# Patient Record
Sex: MALE | Race: WHITE | NOT HISPANIC OR LATINO | Employment: OTHER | URBAN - NONMETROPOLITAN AREA
[De-identification: names, ages, dates, MRNs, and addresses within clinical notes are randomized per-mention and may not be internally consistent; named-entity substitution may affect disease eponyms.]

---

## 2017-02-08 ENCOUNTER — OFFICE VISIT (OUTPATIENT)
Dept: FAMILY MEDICINE CLINIC | Facility: CLINIC | Age: 37
End: 2017-02-08

## 2017-02-08 VITALS
WEIGHT: 155.8 LBS | BODY MASS INDEX: 24.45 KG/M2 | HEIGHT: 67 IN | SYSTOLIC BLOOD PRESSURE: 102 MMHG | DIASTOLIC BLOOD PRESSURE: 72 MMHG

## 2017-02-08 DIAGNOSIS — M54.2 CERVICAL PAIN (NECK): ICD-10-CM

## 2017-02-08 DIAGNOSIS — G83.81 HEMIPARAPLEGIC SYNDROME (HCC): ICD-10-CM

## 2017-02-08 DIAGNOSIS — F41.9 ANXIETY: ICD-10-CM

## 2017-02-08 DIAGNOSIS — G83.81 BROWN-SEQUARD SYNDROME (HCC): ICD-10-CM

## 2017-02-08 DIAGNOSIS — Z99.3 DEPENDENCE ON WHEELCHAIR: Primary | ICD-10-CM

## 2017-02-08 DIAGNOSIS — M62.838 MUSCLE SPASM: ICD-10-CM

## 2017-02-08 DIAGNOSIS — F32.89 OTHER DEPRESSION: ICD-10-CM

## 2017-02-08 PROCEDURE — 99213 OFFICE O/P EST LOW 20 MIN: CPT | Performed by: NURSE PRACTITIONER

## 2017-02-08 RX ORDER — VENLAFAXINE HYDROCHLORIDE 75 MG/1
75 CAPSULE, EXTENDED RELEASE ORAL DAILY
COMMUNITY
End: 2017-02-08 | Stop reason: SDUPTHER

## 2017-02-08 RX ORDER — TRAZODONE HYDROCHLORIDE 50 MG/1
50 TABLET ORAL NIGHTLY
Qty: 30 TABLET | Refills: 5 | Status: SHIPPED | OUTPATIENT
Start: 2017-02-08 | End: 2018-04-17 | Stop reason: HOSPADM

## 2017-02-08 RX ORDER — CLONAZEPAM 1 MG/1
1 TABLET ORAL 4 TIMES DAILY PRN
Qty: 120 TABLET | Refills: 0 | Status: SHIPPED | OUTPATIENT
Start: 2017-02-08 | End: 2017-03-08 | Stop reason: SDUPTHER

## 2017-02-08 RX ORDER — TROLAMINE SALICYLATE 10 G/100G
CREAM TOPICAL AS NEEDED
COMMUNITY
End: 2018-02-02

## 2017-02-08 RX ORDER — CLONAZEPAM 0.25 MG/1
0.25 TABLET, ORALLY DISINTEGRATING ORAL 2 TIMES DAILY PRN
COMMUNITY
End: 2017-03-08 | Stop reason: DRUGHIGH

## 2017-02-08 RX ORDER — OXYCODONE HYDROCHLORIDE AND ACETAMINOPHEN 5; 325 MG/1; MG/1
2 TABLET ORAL EVERY 8 HOURS PRN
COMMUNITY
End: 2017-03-10 | Stop reason: ALTCHOICE

## 2017-02-08 RX ORDER — BACLOFEN 10 MG/1
5 TABLET ORAL 3 TIMES DAILY
COMMUNITY
End: 2017-02-08 | Stop reason: SDUPTHER

## 2017-02-08 RX ORDER — DOCUSATE SODIUM 100 MG/1
100 CAPSULE, LIQUID FILLED ORAL 2 TIMES DAILY
COMMUNITY
End: 2017-07-20 | Stop reason: SDUPTHER

## 2017-02-08 RX ORDER — GABAPENTIN 400 MG/1
400 CAPSULE ORAL 3 TIMES DAILY
Qty: 90 CAPSULE | Refills: 5 | Status: SHIPPED | OUTPATIENT
Start: 2017-02-08 | End: 2017-07-20 | Stop reason: SDUPTHER

## 2017-02-08 RX ORDER — MELATONIN
1000 DAILY
COMMUNITY
End: 2017-03-10 | Stop reason: ALTCHOICE

## 2017-02-08 RX ORDER — BACLOFEN 10 MG/1
5 TABLET ORAL 3 TIMES DAILY
Qty: 90 TABLET | Refills: 5 | Status: SHIPPED | OUTPATIENT
Start: 2017-02-08 | End: 2017-07-20 | Stop reason: SDUPTHER

## 2017-02-08 RX ORDER — GABAPENTIN 600 MG/1
600 TABLET ORAL 3 TIMES DAILY
COMMUNITY
End: 2017-02-08 | Stop reason: SDUPTHER

## 2017-02-08 RX ORDER — VENLAFAXINE HYDROCHLORIDE 75 MG/1
75 CAPSULE, EXTENDED RELEASE ORAL DAILY
Qty: 30 CAPSULE | Refills: 5 | Status: SHIPPED | OUTPATIENT
Start: 2017-02-08 | End: 2017-03-08 | Stop reason: DRUGHIGH

## 2017-02-08 RX ORDER — TRAZODONE HYDROCHLORIDE 50 MG/1
50 TABLET ORAL NIGHTLY
COMMUNITY
End: 2017-02-08 | Stop reason: SDUPTHER

## 2017-02-08 RX ORDER — GABAPENTIN 600 MG/1
600 TABLET ORAL 3 TIMES DAILY
Qty: 90 TABLET | Refills: 5 | Status: SHIPPED | OUTPATIENT
Start: 2017-02-08 | End: 2017-07-20 | Stop reason: SDUPTHER

## 2017-02-08 RX ORDER — UREA 10 %
1 LOTION (ML) TOPICAL NIGHTLY
COMMUNITY
End: 2018-02-02

## 2017-02-08 RX ORDER — POLYETHYLENE GLYCOL 3350 17 G/17G
17 POWDER, FOR SOLUTION ORAL DAILY
COMMUNITY
End: 2018-02-02

## 2017-02-08 NOTE — PROGRESS NOTES
Chief Complaint   Patient presents with   • Follow-up     jared on meds     Subjective   Jean Peñaloza is a 36 y.o. male.     Neck Pain    This is a new (Dec 17th 2016) problem. The current episode started more than 1 month ago. The problem occurs constantly. The problem has been gradually worsening. The pain is associated with a fall. The pain is present in the anterior neck. The quality of the pain is described as aching. The pain is at a severity of 10/10. The pain is severe. The pain is same all the time. Stiffness is present all day. Associated symptoms include headaches, numbness and weakness. Pertinent negatives include no chest pain, fever, leg pain, pain with swallowing, paresis, photophobia, syncope, tingling, trouble swallowing, visual change or weight loss. Associated symptoms comments: See hospital report and rehab report for further information-[facility] . He has tried acetaminophen for the symptoms. The treatment provided mild relief.   Anxiety   Symptoms include decreased concentration and nervous/anxious behavior. Patient reports no chest pain, confusion, dizziness, shortness of breath or suicidal ideas.            The following portions of the patient's history were reviewed and updated as appropriate: allergies, current medications, past social history and problem list.    Review of Systems   Constitutional: Positive for activity change, appetite change and unexpected weight change. Negative for chills, diaphoresis, fatigue, fever and weight loss.   HENT: Negative for trouble swallowing.    Eyes: Negative.  Negative for photophobia, pain, discharge and itching.   Respiratory: Negative.  Negative for apnea, cough, choking, chest tightness, shortness of breath and stridor.    Cardiovascular: Negative.  Negative for chest pain, leg swelling and syncope.   Gastrointestinal: Negative.  Negative for abdominal distention and abdominal pain.   Endocrine:  "Negative.  Negative for cold intolerance and heat intolerance.   Genitourinary: Positive for difficulty urinating. Negative for dysuria.   Musculoskeletal: Positive for arthralgias, back pain, gait problem, joint swelling, myalgias, neck pain and neck stiffness.        Wheelchair for transfer    Skin: Negative.    Allergic/Immunologic: Negative.    Neurological: Positive for speech difficulty, weakness, numbness and headaches. Negative for dizziness, tingling, tremors, seizures, syncope, facial asymmetry and light-headedness.        Left sided weakness from c spine injury    Hematological: Negative.    Psychiatric/Behavioral: Positive for decreased concentration and sleep disturbance. Negative for agitation, behavioral problems, confusion, dysphoric mood, hallucinations, self-injury and suicidal ideas. The patient is nervous/anxious. The patient is not hyperactive.        Objective   Visit Vitals   • /72 (BP Location: Right arm, Patient Position: Sitting, Cuff Size: Adult)   • Ht 67\" (170.2 cm)   • Wt 155 lb 12.8 oz (70.7 kg)   • BMI 24.4 kg/m2     Physical Exam   Constitutional: He is oriented to person, place, and time. He appears well-developed and well-nourished.   HENT:   Head: Normocephalic and atraumatic.   Eyes: EOM are normal. Pupils are equal, round, and reactive to light.   Neck: Normal range of motion. Neck supple.   Cardiovascular: Normal rate.    Pulmonary/Chest: Effort normal.   Abdominal: Soft. Bowel sounds are normal.   Musculoskeletal: He exhibits tenderness.        Right shoulder: He exhibits decreased range of motion.        Right elbow: He exhibits decreased range of motion.        Right wrist: He exhibits decreased range of motion.        Right hip: He exhibits decreased range of motion, decreased strength and tenderness. He exhibits no bony tenderness, no swelling, no crepitus, no deformity and no laceration.        Right knee: He exhibits decreased range of motion.        Right ankle: " He exhibits decreased range of motion.   Wheelchair bound-right sided weakness, decreased rom on right side, left sided numbness    Neurological: He is alert and oriented to person, place, and time.   Nursing note and vitals reviewed.      Assessment/Plan   Problem List Items Addressed This Visit        Nervous and Auditory    Brown-Sequard syndrome    Cervical pain (neck)    Relevant Orders    Ambulatory Referral to Pain Management    Hemiparaplegic syndrome       Musculoskeletal and Integument    Muscle spasm       Other    Anxiety    Depression    Relevant Medications    venlafaxine XR (EFFEXOR-XR) 75 MG 24 hr capsule    traZODone (DESYREL) 50 MG tablet    Dependence on wheelchair - Primary    Relevant Orders    Ambulatory Referral to Pain Management           New Medications Ordered This Visit   Medications   • clonazePAM (KlonoPIN) 0.25 MG disintegrating tablet     Sig: Take 0.25 mg by mouth 2 (Two) Times a Day As Needed for seizures.   • cholecalciferol (VITAMIN D3) 1000 UNITS tablet     Sig: Take 1,000 Units by mouth Daily.   • docusate sodium (COLACE) 100 MG capsule     Sig: Take 100 mg by mouth 2 (Two) Times a Day.   • melatonin 1 MG tablet     Sig: Take 1 mg by mouth Every Night.   • oxyCODONE-acetaminophen (PERCOCET) 5-325 MG per tablet     Sig: Take 2 tablets by mouth Every 8 (Eight) Hours As Needed for other.   • polyethylene glycol (MIRALAX) packet     Sig: Take 17 g by mouth Daily.   • trolamine salicylate (ASPERCREME) 10 % cream     Sig: Apply  topically As Needed for muscle/joint pain.   • venlafaxine XR (EFFEXOR-XR) 75 MG 24 hr capsule     Sig: Take 1 capsule by mouth Daily.     Dispense:  30 capsule     Refill:  5   • traZODone (DESYREL) 50 MG tablet     Sig: Take 1 tablet by mouth Every Night.     Dispense:  30 tablet     Refill:  5   • gabapentin (NEURONTIN) 600 MG tablet     Sig: Take 1 tablet by mouth 3 (Three) Times a Day.     Dispense:  90 tablet     Refill:  5   • baclofen (LIORESAL) 10 MG  tablet     Sig: Take 0.5 tablets by mouth 3 (Three) Times a Day.     Dispense:  90 tablet     Refill:  5   • clonazePAM (KLONOPIN) 1 MG tablet     Sig: Take 1 tablet by mouth 4 (Four) Times a Day As Needed for seizures.     Dispense:  120 tablet     Refill:  0   • gabapentin (NEURONTIN) 400 MG capsule     Sig: Take 1 capsule by mouth 3 (Three) Times a Day.     Dispense:  90 capsule     Refill:  5

## 2017-03-08 ENCOUNTER — OFFICE VISIT (OUTPATIENT)
Dept: FAMILY MEDICINE CLINIC | Facility: CLINIC | Age: 37
End: 2017-03-08

## 2017-03-08 VITALS
BODY MASS INDEX: 24.33 KG/M2 | SYSTOLIC BLOOD PRESSURE: 100 MMHG | WEIGHT: 155 LBS | DIASTOLIC BLOOD PRESSURE: 62 MMHG | HEIGHT: 67 IN

## 2017-03-08 DIAGNOSIS — M62.838 MUSCLE SPASM: ICD-10-CM

## 2017-03-08 DIAGNOSIS — G83.81 HEMIPARAPLEGIC SYNDROME (HCC): ICD-10-CM

## 2017-03-08 DIAGNOSIS — Z99.3 DEPENDENCE ON WHEELCHAIR: Primary | ICD-10-CM

## 2017-03-08 DIAGNOSIS — F41.9 ANXIETY: ICD-10-CM

## 2017-03-08 PROCEDURE — 99213 OFFICE O/P EST LOW 20 MIN: CPT | Performed by: NURSE PRACTITIONER

## 2017-03-08 RX ORDER — VENLAFAXINE HYDROCHLORIDE 150 MG/1
150 CAPSULE, EXTENDED RELEASE ORAL DAILY
COMMUNITY
End: 2017-11-16 | Stop reason: SDUPTHER

## 2017-03-08 RX ORDER — CLONAZEPAM 1 MG/1
1 TABLET ORAL 4 TIMES DAILY PRN
Qty: 120 TABLET | Refills: 0 | Status: SHIPPED | OUTPATIENT
Start: 2017-03-08 | End: 2017-04-14 | Stop reason: SDUPTHER

## 2017-03-08 NOTE — PROGRESS NOTES
Chief Complaint   Patient presents with   • Follow-up     1 month jared   • Med Refill     [facility]   Jean Peñaloza is a 36 y.o. male.     Neck Pain    This is a new (Dec 17th 2016) problem. The current episode started more than 1 month ago. The problem occurs constantly. The problem has been gradually worsening. The pain is associated with a fall. The pain is present in the anterior neck. The quality of the pain is described as aching. The pain is at a severity of 10/10. The pain is severe. The pain is same all the time. Stiffness is present all day. Associated symptoms include headaches, numbness and weakness. Pertinent negatives include no chest pain, fever, leg pain, pain with swallowing, paresis, photophobia, syncope, tingling, trouble swallowing, visual change or weight loss. Associated symptoms comments: See hospital report and rehab report for further information-[facility] . He has tried acetaminophen for the symptoms. The treatment provided mild relief.   Anxiety   Presents for follow-up visit. Symptoms include decreased concentration, excessive worry, irritability, nervous/anxious behavior, obsessions, palpitations and panic. Patient reports no chest pain, compulsions, confusion, depressed mood, dizziness, dry mouth, feeling of choking, hyperventilation, impotence, insomnia, malaise, muscle tension, nausea, restlessness, shortness of breath or suicidal ideas. Symptoms occur constantly. The severity of symptoms is moderate. The quality of sleep is good. Nighttime awakenings: none.     Compliance with medications is %.        The following portions of the patient's history were reviewed and updated as appropriate: allergies, current medications, past social history and problem list.    Review of Systems   Constitutional: Positive for activity change, appetite change, irritability and unexpected weight change. Negative for chills, diaphoresis,  "fatigue, fever and weight loss.   HENT: Negative for congestion, dental problem, drooling and trouble swallowing.    Eyes: Negative.  Negative for photophobia, pain, discharge and itching.   Respiratory: Negative.  Negative for apnea, cough, choking, chest tightness, shortness of breath and stridor.    Cardiovascular: Positive for palpitations. Negative for chest pain, leg swelling and syncope.   Gastrointestinal: Negative.  Negative for abdominal distention, abdominal pain and nausea.   Endocrine: Negative.  Negative for cold intolerance and heat intolerance.   Genitourinary: Negative.  Negative for dysuria and impotence.   Musculoskeletal: Positive for arthralgias, back pain, gait problem, joint swelling, myalgias, neck pain and neck stiffness.        Wheelchair for transfer    Skin: Negative.    Allergic/Immunologic: Negative.    Neurological: Positive for speech difficulty, weakness, numbness and headaches. Negative for dizziness, tingling, tremors, seizures, syncope, facial asymmetry and light-headedness.        Left sided weakness from c spine injury    Hematological: Negative.    Psychiatric/Behavioral: Positive for decreased concentration and sleep disturbance. Negative for agitation, behavioral problems, confusion, dysphoric mood, hallucinations, self-injury and suicidal ideas. The patient is nervous/anxious. The patient does not have insomnia and is not hyperactive.        Objective   Visit Vitals   • /62 (BP Location: Left arm, Patient Position: Sitting, Cuff Size: Adult)   • Ht 67\" (170.2 cm)   • Wt 155 lb (70.3 kg)   • BMI 24.28 kg/m2     Physical Exam   Constitutional: He is oriented to person, place, and time. He appears well-developed and well-nourished.   HENT:   Head: Normocephalic and atraumatic.   Eyes: EOM are normal. Pupils are equal, round, and reactive to light.   Neck: Normal range of motion. Neck supple.   Cardiovascular: Normal rate.    Pulmonary/Chest: Effort normal and breath " sounds normal.   Abdominal: Soft. Bowel sounds are normal.   Musculoskeletal: He exhibits tenderness.        Right shoulder: He exhibits decreased range of motion.        Right elbow: He exhibits decreased range of motion.        Right wrist: He exhibits decreased range of motion.        Right hip: He exhibits decreased range of motion, decreased strength and tenderness. He exhibits no bony tenderness, no swelling, no crepitus, no deformity and no laceration.        Right knee: He exhibits decreased range of motion.        Right ankle: He exhibits decreased range of motion.   Wheelchair bound-right sided weakness, decreased rom on right side, left sided numbness    Neurological: He is alert and oriented to person, place, and time.   Skin: Skin is warm and dry.   Nursing note and vitals reviewed.      Assessment/Plan   Problem List Items Addressed This Visit        Nervous and Auditory    Hemiparaplegic syndrome       Musculoskeletal and Integument    Muscle spasm       Other    Anxiety    Dependence on wheelchair - Primary           New Medications Ordered This Visit   Medications   • venlafaxine XR (EFFEXOR-XR) 150 MG 24 hr capsule     Sig: Take 150 mg by mouth Daily.   • clonazePAM (KLONOPIN) 1 MG tablet     Sig: Take 1 tablet by mouth 4 (Four) Times a Day As Needed for seizures.     Dispense:  120 tablet     Refill:  0        Patient understands the risks associated with this controlled medication, including tolerance and addiction.  he also agrees to only obtain this medication from me, and not from a another provider, unless that provider is covering for me in my absence.  he also agrees to be compliant in dosing, and not self adjust the dose of medication.  A signed controlled substance agreement is on file, and he has received a controlled substance education sheet at this a previous visit.  he has also signed a consent for treatment with a controlled substance as per [facility] policy. MAN was  obtained.

## 2017-03-10 ENCOUNTER — OFFICE VISIT (OUTPATIENT)
Dept: PAIN MEDICINE | Facility: CLINIC | Age: 37
End: 2017-03-10

## 2017-03-10 VITALS
DIASTOLIC BLOOD PRESSURE: 64 MMHG | HEIGHT: 67 IN | BODY MASS INDEX: 24.64 KG/M2 | SYSTOLIC BLOOD PRESSURE: 100 MMHG | WEIGHT: 157 LBS

## 2017-03-10 DIAGNOSIS — M25.50 ARTHRALGIA, UNSPECIFIED JOINT: ICD-10-CM

## 2017-03-10 DIAGNOSIS — G83.81 HEMIPARAPLEGIC SYNDROME (HCC): Primary | ICD-10-CM

## 2017-03-10 DIAGNOSIS — Z99.3 DEPENDENCE ON WHEELCHAIR: ICD-10-CM

## 2017-03-10 DIAGNOSIS — M54.2 CERVICAL PAIN (NECK): ICD-10-CM

## 2017-03-10 DIAGNOSIS — R07.89 STERNUM PAIN: ICD-10-CM

## 2017-03-10 PROCEDURE — 99213 OFFICE O/P EST LOW 20 MIN: CPT | Performed by: NURSE PRACTITIONER

## 2017-03-10 RX ORDER — OXYCODONE AND ACETAMINOPHEN 10; 325 MG/1; MG/1
TABLET ORAL
Refills: 0 | COMMUNITY
Start: 2017-02-09 | End: 2017-05-19

## 2017-03-10 RX ORDER — MAGNESIUM HYDROXIDE 1200 MG/15ML
SUSPENSION ORAL
COMMUNITY
Start: 2017-01-30 | End: 2017-03-10 | Stop reason: ALTCHOICE

## 2017-03-10 NOTE — PROGRESS NOTES
"Jean Peñaloza is a 36 y.o. male.   1980    HPI:   Location: neck and middle back  Quality: stabbing  Severity: 7/10  Timing: constant  Alleviating: nothing  Aggravating: positional      Pt referred to Pain management via [facility] related to Chronic Neck and middle back pain. Pt states \"I was in a [place]  12/17/16.. Landed on my head\". Fractured C5-C6 Crushed (spinal incomplete tear)-- Dr. Campos fused C5-6-7. Pt was completed paralyzed neck down at first, but has gained some mobility with arms and legs. Pt was taken to [facility] ICU, [facility], and continues PT 4 days a week as well. Pt complains of pain in sternum (fracture), neck, and mid back. Pt has used opiate medications via [place] with effectiveness, and continues to do [facility]. Pt has used heat for therapy.       The following portions of the patient's history were reviewed by me and updated as appropriate: allergies, current medications, past family history, past medical history, past social history, past surgical history and problem list.    Past Medical History   Diagnosis Date   • Acute bronchitis    • Anxiety    • Cluster headache 12/02/2015   • Conjunctivitis 08/26/2014   • Depression    • Dorsalgia    • Malaise    • Panic disorder    • Shoulder pain      severe on right          Social History     Social History   • Marital status:      Spouse name: N/A   • Number of children: N/A   • Years of education: N/A     Occupational History   • Not on file.     Social History Main Topics   • Smoking status: Current Every Day Smoker     Types: Cigarettes     Last attempt to quit: 8/14/2014   • Smokeless tobacco: Not on file      Comment: Smoking cessation information given   • Alcohol use No   • Drug use: No   • Sexual activity: Defer     Other Topics Concern   • Not on file     Social History Narrative       Family History   Problem Relation Age of Onset   • Cancer Maternal Grandfather    • " Diabetes Maternal Grandfather          Current Outpatient Prescriptions:   •  baclofen (LIORESAL) 10 MG tablet, Take 0.5 tablets by mouth 3 (Three) Times a Day., Disp: 90 tablet, Rfl: 5  •  clonazePAM (KLONOPIN) 1 MG tablet, Take 1 tablet by mouth 4 (Four) Times a Day As Needed for seizures., Disp: 120 tablet, Rfl: 0  •  docusate sodium (COLACE) 100 MG capsule, Take 100 mg by mouth 2 (Two) Times a Day., Disp: , Rfl:   •  gabapentin (NEURONTIN) 400 MG capsule, Take 1 capsule by mouth 3 (Three) Times a Day., Disp: 90 capsule, Rfl: 5  •  gabapentin (NEURONTIN) 600 MG tablet, Take 1 tablet by mouth 3 (Three) Times a Day., Disp: 90 tablet, Rfl: 5  •  melatonin 1 MG tablet, Take 1 mg by mouth Every Night., Disp: , Rfl:   •  oxyCODONE-acetaminophen (PERCOCET)  MG per tablet, TK 1 T PO Q 6 H PRN FOR PAIN, Disp: , Rfl: 0  •  polyethylene glycol (MIRALAX) packet, Take 17 g by mouth Daily., Disp: , Rfl:   •  traZODone (DESYREL) 50 MG tablet, Take 1 tablet by mouth Every Night., Disp: 30 tablet, Rfl: 5  •  trolamine salicylate (ASPERCREME) 10 % cream, Apply  topically As Needed for muscle/joint pain., Disp: , Rfl:   •  venlafaxine XR (EFFEXOR-XR) 150 MG 24 hr capsule, Take 150 mg by mouth Daily., Disp: , Rfl:     No Known Allergies      He has already failed the following measures, including:   Conservative measures: heat     Review of Systems   Gastrointestinal: Blood in stool: middle.   Musculoskeletal: Positive for back pain and neck pain.     10 system review of systems was reviewed and negative except for above.    Physical Exam   Constitutional: He is oriented to person, place, and time.   Generalized muscle atrophy   HENT:   Head: Normocephalic and atraumatic.   Eyes: Pupils are equal, round, and reactive to light.   Neck: Spinous process tenderness and muscular tenderness present. Decreased range of motion present.   Cardiovascular: Normal rate.    Pulmonary/Chest: Effort normal and breath sounds normal. He  exhibits tenderness ( sternum).   Abdominal: Soft. Bowel sounds are normal. There is no tenderness.   Musculoskeletal:        Cervical back: He exhibits decreased range of motion ( decrease limited AROM) and tenderness.        Thoracic back: He exhibits tenderness.   Sternum pain    Decreased range of motion noted to neck, right upper arm joints greater then left upper arm joints, and left lower extremity joints greater than right lower extremity joints.   Neurological: He is alert and oriented to person, place, and time. He displays atrophy. He displays no tremor. A sensory deficit ( left sided torso and left leg numbness ) is present. He exhibits abnormal muscle tone (diffuse muscle atrophy  ). He displays no seizure activity. Coordination and gait ( WC and CANE ) abnormal.   Reflex Scores:       Tricep reflexes are 0 on the right side and 1+ on the left side.       Bicep reflexes are 0 on the right side and 1+ on the left side.       Brachioradialis reflexes are 0 on the right side and 1+ on the left side.       Patellar reflexes are 2+ on the right side and 0 on the left side.       Achilles reflexes are 1+ on the right side and 0 on the left side.  Right upper arm strength 3/5  Left quad and lower leg 3/5   Skin: Skin is warm and dry. He is not diaphoretic.   Psychiatric: He has a normal mood and affect. His behavior is normal. Judgment normal. He expresses no homicidal and no suicidal ideation. He expresses no suicidal plans and no homicidal plans.   Nursing note and vitals reviewed.      Jean was seen today for back pain and neck pain.    Diagnoses and all orders for this visit:    Hemiparaplegic syndrome    Dependence on wheelchair    Cervical pain (neck)    Sternum pain    Arthralgia, unspecified joint        Medication: Patient reports no negative side effects, Patient reports appropriate usage and storage habits, Patient's opioid provides enough reflief to be more active and perform activities of daily  "living with less discomfort. and Opioid contract was read and discussed today and the patient chooses to sign. Percocet 10 TID, discussed at some point with improvement of motor skills medication will be reduced.  Discussed case with Dr. Navdeep Spann, opiate medication Percocet 10 mg 3 times a day given ×2 scripts with follow-up and 2 months.    Interventional: Patient continues to do physical therapy through [place] and home exercises.  Patient uses a walker for mobility and cane for ambulation.  Patient and family very pleased with today's pain management visit.    Rehab: Pt receives PT in [place] 4 days a week.  Patient states in December he was unable to move his arms and legs with purpose… And now is able to walk and move all joints to some degree.\"    Behavioral: No aberrant behavior noted. MAN Report # [id number]  was reviewed and is consistent with stated history    Urine drug screen None at this time. UDS next visit.     ORT: 1  Pt controlled Depression with Effexor. NO SI or SI thoughts.  Discussed with patient and family that any uncontrollable depression or suicidal ideations needs to have emergency assistance and counseling.    PHQ-9: 5          This document has been electronically signed by REG Gibbs on March 10, 2017 8:58 PM          This document has been electronically signed by REG Gibbs on March 10, 2017 8:58 PM     "

## 2017-03-29 NOTE — PROGRESS NOTES
Addendum:  I have reviewed this patient with REG Platt.  I agree with the above findings and plan.  I have personally spoken with the patient today.

## 2017-04-14 ENCOUNTER — OFFICE VISIT (OUTPATIENT)
Dept: FAMILY MEDICINE CLINIC | Facility: CLINIC | Age: 37
End: 2017-04-14

## 2017-04-14 VITALS
BODY MASS INDEX: 23.95 KG/M2 | DIASTOLIC BLOOD PRESSURE: 60 MMHG | HEIGHT: 67 IN | WEIGHT: 152.6 LBS | SYSTOLIC BLOOD PRESSURE: 100 MMHG

## 2017-04-14 DIAGNOSIS — M62.838 MUSCLE SPASM: ICD-10-CM

## 2017-04-14 DIAGNOSIS — F41.9 ANXIETY: Primary | ICD-10-CM

## 2017-04-14 DIAGNOSIS — Z72.0 TOBACCO USE: ICD-10-CM

## 2017-04-14 DIAGNOSIS — R63.0 DECREASED APPETITE: ICD-10-CM

## 2017-04-14 DIAGNOSIS — G83.81 BROWN-SEQUARD SYNDROME (HCC): ICD-10-CM

## 2017-04-14 DIAGNOSIS — G83.81 HEMIPARAPLEGIC SYNDROME (HCC): ICD-10-CM

## 2017-04-14 DIAGNOSIS — F32.89 OTHER DEPRESSION: ICD-10-CM

## 2017-04-14 PROCEDURE — 99214 OFFICE O/P EST MOD 30 MIN: CPT | Performed by: NURSE PRACTITIONER

## 2017-04-14 RX ORDER — MEGESTROL ACETATE 20 MG/1
20 TABLET ORAL DAILY
Qty: 30 TABLET | Refills: 11 | Status: SHIPPED | OUTPATIENT
Start: 2017-04-14 | End: 2018-04-17 | Stop reason: HOSPADM

## 2017-04-14 RX ORDER — CLONAZEPAM 1 MG/1
1 TABLET ORAL 4 TIMES DAILY PRN
Qty: 120 TABLET | Refills: 0 | Status: SHIPPED | OUTPATIENT
Start: 2017-04-14 | End: 2017-05-19 | Stop reason: SDUPTHER
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
## 2017-05-19 ENCOUNTER — OFFICE VISIT (OUTPATIENT)
Dept: FAMILY MEDICINE CLINIC | Facility: CLINIC | Age: 37
End: 2017-05-19

## 2017-05-19 VITALS
BODY MASS INDEX: 24 KG/M2 | SYSTOLIC BLOOD PRESSURE: 110 MMHG | HEIGHT: 67 IN | WEIGHT: 152.9 LBS | DIASTOLIC BLOOD PRESSURE: 78 MMHG

## 2017-05-19 DIAGNOSIS — M62.838 MUSCLE SPASM: ICD-10-CM

## 2017-05-19 DIAGNOSIS — G83.81 BROWN-SEQUARD SYNDROME (HCC): ICD-10-CM

## 2017-05-19 DIAGNOSIS — Z99.3 DEPENDENCE ON WHEELCHAIR: ICD-10-CM

## 2017-05-19 DIAGNOSIS — F41.9 ANXIETY: ICD-10-CM

## 2017-05-19 DIAGNOSIS — G83.81 HEMIPARAPLEGIC SYNDROME (HCC): ICD-10-CM

## 2017-05-19 DIAGNOSIS — M54.2 CERVICAL PAIN (NECK): Primary | ICD-10-CM

## 2017-05-19 PROCEDURE — 99214 OFFICE O/P EST MOD 30 MIN: CPT | Performed by: NURSE PRACTITIONER

## 2017-05-19 RX ORDER — CLONAZEPAM 1 MG/1
1 TABLET ORAL 4 TIMES DAILY PRN
Qty: 120 TABLET | Refills: 0 | Status: SHIPPED | OUTPATIENT
Start: 2017-05-19 | End: 2017-06-29 | Stop reason: SDUPTHER

## 2017-06-29 ENCOUNTER — OFFICE VISIT (OUTPATIENT)
Dept: FAMILY MEDICINE CLINIC | Facility: CLINIC | Age: 37
End: 2017-06-29

## 2017-06-29 VITALS
WEIGHT: 145.6 LBS | BODY MASS INDEX: 22.85 KG/M2 | SYSTOLIC BLOOD PRESSURE: 112 MMHG | DIASTOLIC BLOOD PRESSURE: 74 MMHG | HEIGHT: 67 IN

## 2017-06-29 DIAGNOSIS — G83.81 BROWN-SEQUARD SYNDROME (HCC): Primary | ICD-10-CM

## 2017-06-29 DIAGNOSIS — G83.81 HEMIPARAPLEGIC SYNDROME (HCC): ICD-10-CM

## 2017-06-29 DIAGNOSIS — M25.50 ARTHRALGIA, UNSPECIFIED JOINT: ICD-10-CM

## 2017-06-29 DIAGNOSIS — M62.838 MUSCLE SPASM: ICD-10-CM

## 2017-06-29 DIAGNOSIS — M54.2 CERVICAL PAIN (NECK): ICD-10-CM

## 2017-06-29 PROCEDURE — 99214 OFFICE O/P EST MOD 30 MIN: CPT | Performed by: NURSE PRACTITIONER

## 2017-06-29 RX ORDER — CLONAZEPAM 1 MG/1
1 TABLET ORAL 4 TIMES DAILY PRN
Qty: 120 TABLET | Refills: 0 | Status: SHIPPED | OUTPATIENT
Start: 2017-06-29 | End: 2017-07-20 | Stop reason: SDUPTHER
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
## 2017-07-20 ENCOUNTER — OFFICE VISIT (OUTPATIENT)
Dept: FAMILY MEDICINE CLINIC | Facility: CLINIC | Age: 37
End: 2017-07-20

## 2017-07-20 VITALS
SYSTOLIC BLOOD PRESSURE: 108 MMHG | DIASTOLIC BLOOD PRESSURE: 68 MMHG | HEIGHT: 67 IN | BODY MASS INDEX: 22.47 KG/M2 | WEIGHT: 143.2 LBS

## 2017-07-20 DIAGNOSIS — G83.81 BROWN-SEQUARD SYNDROME (HCC): Primary | ICD-10-CM

## 2017-07-20 DIAGNOSIS — M54.2 CERVICAL PAIN (NECK): ICD-10-CM

## 2017-07-20 PROCEDURE — 99213 OFFICE O/P EST LOW 20 MIN: CPT | Performed by: NURSE PRACTITIONER

## 2017-07-20 RX ORDER — BACLOFEN 10 MG/1
5 TABLET ORAL 3 TIMES DAILY
Qty: 90 TABLET | Refills: 5 | Status: SHIPPED | OUTPATIENT
Start: 2017-07-20 | End: 2022-07-20

## 2017-07-20 RX ORDER — DOCUSATE SODIUM 100 MG/1
100 CAPSULE, LIQUID FILLED ORAL 2 TIMES DAILY
Qty: 60 CAPSULE | Refills: 5 | Status: SHIPPED | OUTPATIENT
Start: 2017-07-20 | End: 2018-04-17 | Stop reason: HOSPADM

## 2017-07-20 RX ORDER — GABAPENTIN 400 MG/1
400 CAPSULE ORAL 3 TIMES DAILY
Qty: 90 CAPSULE | Refills: 2 | Status: SHIPPED | OUTPATIENT
Start: 2017-07-20 | End: 2017-08-23 | Stop reason: SDUPTHER

## 2017-07-20 RX ORDER — GABAPENTIN 600 MG/1
600 TABLET ORAL 3 TIMES DAILY
Qty: 90 TABLET | Refills: 2 | Status: SHIPPED | OUTPATIENT
Start: 2017-07-20 | End: 2017-08-23 | Stop reason: SDUPTHER

## 2017-07-20 RX ORDER — CLONAZEPAM 1 MG/1
1 TABLET ORAL 4 TIMES DAILY PRN
Qty: 120 TABLET | Refills: 0 | Status: SHIPPED | OUTPATIENT
Start: 2017-07-20 | End: 2017-10-02 | Stop reason: SDUPTHER

## 2017-07-20 NOTE — PROGRESS NOTES
Chief Complaint   Patient presents with   • Follow-up   • Anxiety   • Med Refill     Subjective   Jean Peñaloza is a 36 y.o. male.     Anxiety   Presents for follow-up visit. Symptoms include decreased concentration, excessive worry, irritability, nervous/anxious behavior, obsessions, palpitations and panic. Patient reports no chest pain, compulsions, confusion, depressed mood, dizziness, dry mouth, feeling of choking, hyperventilation, impotence, insomnia, malaise, muscle tension, nausea, restlessness, shortness of breath or suicidal ideas. Symptoms occur constantly. The severity of symptoms is moderate. The quality of sleep is good. Nighttime awakenings: none.     Compliance with medications is %.   Neck Pain    This is a new (Dec 17th 2016) problem. The current episode started more than 1 month ago. The problem occurs constantly. The problem has been gradually worsening. The pain is associated with a fall. The pain is present in the anterior neck. The quality of the pain is described as aching. The pain is at a severity of 10/10. The pain is severe. The pain is same all the time. Stiffness is present all day. Associated symptoms include headaches, numbness and weakness. Pertinent negatives include no chest pain, fever, leg pain, pain with swallowing, paresis, photophobia, syncope, tingling, trouble swallowing, visual change or weight loss. Associated symptoms comments: See hospital report and rehab report for further information-[facility] . He has tried acetaminophen for the symptoms. The treatment provided mild relief.        The following portions of the patient's history were reviewed and updated as appropriate: allergies, current medications, past social history and problem list.    Review of Systems   Constitutional: Positive for activity change, appetite change, irritability and unexpected weight change. Negative for chills, diaphoresis, fatigue, fever and  "weight loss.        Decreased appetite reported -unable to eat regular due to therapy scheduled 4 days a week in [place]    HENT: Negative for congestion, dental problem, drooling and trouble swallowing.    Eyes: Positive for visual disturbance. Negative for photophobia, pain, discharge, redness and itching.   Respiratory: Negative.  Negative for apnea, cough, choking, chest tightness, shortness of breath and stridor.    Cardiovascular: Positive for palpitations. Negative for chest pain, leg swelling and syncope.   Gastrointestinal: Negative.  Negative for abdominal distention, abdominal pain and nausea.   Endocrine: Negative.  Negative for cold intolerance, heat intolerance, polydipsia, polyphagia and polyuria.   Genitourinary: Positive for difficulty urinating. Negative for decreased urine volume, discharge, dysuria, enuresis, flank pain, frequency, genital sores, hematuria, impotence, penile pain, penile swelling, scrotal swelling, testicular pain and urgency.        ED due to injury    Musculoskeletal: Positive for arthralgias, back pain, gait problem, joint swelling, myalgias, neck pain and neck stiffness.        Wheelchair for transfer    Skin: Negative.    Allergic/Immunologic: Negative.  Negative for environmental allergies, food allergies and immunocompromised state.   Neurological: Positive for speech difficulty, weakness, numbness and headaches. Negative for dizziness, tingling, tremors, seizures, syncope, facial asymmetry and light-headedness.        Left sided weakness from c spine injury    Hematological: Negative.    Psychiatric/Behavioral: Positive for agitation, decreased concentration, dysphoric mood and sleep disturbance. Negative for behavioral problems, confusion, hallucinations, self-injury and suicidal ideas. The patient is nervous/anxious. The patient does not have insomnia and is not hyperactive.    All other systems reviewed and are negative.      Objective   /68  Ht 67\" (170.2 " cm)  Wt 143 lb 3.2 oz (65 kg)  BMI 22.43 kg/m2  Physical Exam   Constitutional: He is oriented to person, place, and time. He appears well-developed and well-nourished. No distress.   HENT:   Head: Normocephalic and atraumatic.   Mouth/Throat: No oropharyngeal exudate.   Eyes: EOM are normal. Pupils are equal, round, and reactive to light. Right eye exhibits no discharge. Left eye exhibits no discharge. No scleral icterus.   Neck: Normal range of motion. Neck supple. No JVD present. No tracheal deviation present. No thyromegaly present.   Cardiovascular: Normal rate.  Exam reveals no gallop and no friction rub.    No murmur heard.  Pulmonary/Chest: Effort normal and breath sounds normal. No stridor. No respiratory distress. He has no wheezes. He has no rales. He exhibits no tenderness.   Abdominal: Soft. Bowel sounds are normal. He exhibits no distension and no mass. There is tenderness. There is no rebound and no guarding. No hernia.   Musculoskeletal: He exhibits tenderness. He exhibits no edema or deformity.   Wheelchair bound-right sided weakness, decreased rom on right side, left sided numbness    Lymphadenopathy:     He has no cervical adenopathy.   Neurological: He is alert and oriented to person, place, and time. He has normal reflexes. He displays normal reflexes. No cranial nerve deficit. He exhibits normal muscle tone. Coordination normal.   Skin: Skin is warm and dry. No rash noted. He is not diaphoretic. No erythema. No pallor.   Nursing note and vitals reviewed.      Assessment/Plan   Problem List Items Addressed This Visit        Nervous and Auditory    Brown-Sequard syndrome - Primary    Relevant Orders    CBC Auto Differential    Comprehensive Metabolic Panel    Magnesium    Vitamin B12    TSH    Vitamin D 25 Hydroxy    Iron    Drug screen panel 1, serum    Cervical pain (neck)    Relevant Orders    CBC Auto Differential    Comprehensive Metabolic Panel    Magnesium    Vitamin B12    TSH     Vitamin D 25 Hydroxy    Iron    Drug screen panel 1, serum           New Medications Ordered This Visit   Medications   • gabapentin (NEURONTIN) 600 MG tablet     Sig: Take 1 tablet by mouth 3 (Three) Times a Day.     Dispense:  90 tablet     Refill:  2   • gabapentin (NEURONTIN) 400 MG capsule     Sig: Take 1 capsule by mouth 3 (Three) Times a Day.     Dispense:  90 capsule     Refill:  2   • clonazePAM (KLONOPIN) 1 MG tablet     Sig: Take 1 tablet by mouth 4 (Four) Times a Day As Needed for Anxiety.     Dispense:  120 tablet     Refill:  0   • baclofen (LIORESAL) 10 MG tablet     Sig: Take 0.5 tablets by mouth 3 (Three) Times a Day.     Dispense:  90 tablet     Refill:  5   • docusate sodium (COLACE) 100 MG capsule     Sig: Take 1 capsule by mouth 2 (Two) Times a Day.     Dispense:  60 capsule     Refill:  5   Patient understands the risks associated with this controlled medication, including tolerance and addiction.  he also agrees to only obtain this medication from me, and not from a another provider, unless that provider is covering for me in my absence.  he also agrees to be compliant in dosing, and not self adjust the dose of medication.  A signed controlled substance agreement is on file, and he has received a controlled substance education sheet at this a previous visit.  he has also signed a consent for treatment with a controlled substance as per [facility] policy. MAN was obtained.  Increase protein in diet, meds as directed, diet discussed, follow up as scheduled.

## 2017-08-23 RX ORDER — GABAPENTIN 600 MG/1
600 TABLET ORAL 3 TIMES DAILY
Qty: 90 TABLET | Refills: 0 | OUTPATIENT
Start: 2017-08-23 | End: 2017-10-02 | Stop reason: SDUPTHER

## 2017-08-23 RX ORDER — GABAPENTIN 400 MG/1
400 CAPSULE ORAL 3 TIMES DAILY
Qty: 90 CAPSULE | Refills: 0 | OUTPATIENT
Start: 2017-08-23 | End: 2017-10-02 | Stop reason: SDUPTHER

## 2017-09-28 ENCOUNTER — OFFICE VISIT (OUTPATIENT)
Dept: FAMILY MEDICINE CLINIC | Facility: CLINIC | Age: 37
End: 2017-09-28

## 2017-09-28 VITALS
DIASTOLIC BLOOD PRESSURE: 78 MMHG | SYSTOLIC BLOOD PRESSURE: 110 MMHG | HEIGHT: 69 IN | WEIGHT: 149.8 LBS | BODY MASS INDEX: 22.19 KG/M2

## 2017-09-28 DIAGNOSIS — G83.81 HEMIPARAPLEGIC SYNDROME (HCC): ICD-10-CM

## 2017-09-28 DIAGNOSIS — F32.89 OTHER DEPRESSION: ICD-10-CM

## 2017-09-28 DIAGNOSIS — F41.9 ANXIETY: Primary | ICD-10-CM

## 2017-09-28 PROCEDURE — 99213 OFFICE O/P EST LOW 20 MIN: CPT | Performed by: NURSE PRACTITIONER

## 2017-09-28 NOTE — PROGRESS NOTES
Chief Complaint   Patient presents with   • Follow-up     cervical pain   • Med Refill     neurontin and klonopin     Subjective   Jean Peñaloza is a 37 y.o. male.     Anxiety   Presents for follow-up visit. Symptoms include decreased concentration, excessive worry, irritability, nervous/anxious behavior, obsessions, palpitations and panic. Patient reports no chest pain, compulsions, confusion, depressed mood, dizziness, dry mouth, feeling of choking, hyperventilation, impotence, insomnia, malaise, muscle tension, nausea, restlessness, shortness of breath or suicidal ideas. Symptoms occur constantly. The severity of symptoms is moderate. The quality of sleep is good. Nighttime awakenings: none.     Compliance with medications is %.   Neck Pain    This is a new (Dec 17th 2016) problem. The current episode started more than 1 month ago. The problem occurs constantly. The problem has been gradually worsening. The pain is associated with a fall. The pain is present in the anterior neck. The quality of the pain is described as aching. The pain is at a severity of 10/10. The pain is severe. The pain is same all the time. Stiffness is present all day. Associated symptoms include headaches, numbness and weakness. Pertinent negatives include no chest pain, fever, leg pain, pain with swallowing, paresis, photophobia, syncope, tingling, trouble swallowing, visual change or weight loss. Associated symptoms comments: See hospital report and rehab report for further information-has been home from [facility] since last week . He has tried acetaminophen for the symptoms. The treatment provided mild relief.        The following portions of the patient's history were reviewed and updated as appropriate: allergies, current medications, past social history and problem list.    Review of Systems   Constitutional: Positive for activity change, appetite change, irritability and unexpected weight change. Negative for  chills, diaphoresis, fatigue, fever and weight loss.        Decreased appetite reported -unable to eat regular due to therapy scheduled 4 days a week in [place]    HENT: Negative for congestion, dental problem, drooling and trouble swallowing.    Eyes: Positive for visual disturbance. Negative for photophobia, pain, discharge, redness and itching.   Respiratory: Negative.  Negative for apnea, cough, choking, chest tightness, shortness of breath and stridor.    Cardiovascular: Positive for palpitations. Negative for chest pain, leg swelling and syncope.   Gastrointestinal: Negative.  Negative for abdominal distention, abdominal pain and nausea.   Endocrine: Negative.  Negative for cold intolerance, heat intolerance, polydipsia, polyphagia and polyuria.   Genitourinary: Positive for difficulty urinating. Negative for decreased urine volume, discharge, dysuria, enuresis, flank pain, frequency, genital sores, hematuria, impotence, penile pain, penile swelling, scrotal swelling, testicular pain and urgency.        ED due to injury    Musculoskeletal: Positive for arthralgias, back pain, gait problem, joint swelling, myalgias, neck pain and neck stiffness.        Wheelchair for transfer    Skin: Negative.    Allergic/Immunologic: Negative.  Negative for environmental allergies, food allergies and immunocompromised state.   Neurological: Positive for speech difficulty, weakness, numbness and headaches. Negative for dizziness, tingling, tremors, seizures, syncope, facial asymmetry and light-headedness.        Left sided weakness from c spine injury    Hematological: Negative.    Psychiatric/Behavioral: Positive for agitation, decreased concentration, dysphoric mood and sleep disturbance. Negative for behavioral problems, confusion, hallucinations, self-injury and suicidal ideas. The patient is nervous/anxious. The patient does not have insomnia and is not hyperactive.    All other systems reviewed and are  "negative.      Objective   /78  Ht 69\" (175.3 cm)  Wt 149 lb 12.8 oz (67.9 kg)  BMI 22.12 kg/m2  Physical Exam   Constitutional: He is oriented to person, place, and time. He appears well-developed and well-nourished. No distress.   HENT:   Head: Normocephalic and atraumatic.   Mouth/Throat: No oropharyngeal exudate.   Eyes: EOM are normal. Pupils are equal, round, and reactive to light. Right eye exhibits no discharge. Left eye exhibits no discharge. No scleral icterus.   Neck: Normal range of motion. Neck supple. No JVD present. No tracheal deviation present. No thyromegaly present.   Cardiovascular: Normal rate.  Exam reveals no gallop and no friction rub.    No murmur heard.  Pulmonary/Chest: Effort normal and breath sounds normal. No stridor. No respiratory distress. He has no wheezes. He has no rales. He exhibits no tenderness.   Abdominal: Soft. Bowel sounds are normal. He exhibits no distension and no mass. There is tenderness. There is no rebound and no guarding. No hernia.   Musculoskeletal: He exhibits tenderness. He exhibits no edema or deformity.   Wheelchair bound-right sided weakness, decreased rom on right side, left sided numbness    Lymphadenopathy:     He has no cervical adenopathy.   Neurological: He is alert and oriented to person, place, and time. He has normal reflexes. He displays normal reflexes. No cranial nerve deficit. He exhibits normal muscle tone. Coordination normal.   Skin: Skin is warm and dry. No rash noted. He is not diaphoretic. No erythema. No pallor.   Nursing note and vitals reviewed.      Assessment/Plan   Problem List Items Addressed This Visit        Nervous and Auditory    Hemiparaplegic syndrome    Relevant Orders    Urine Drug Screen - Urine, Clean Catch       Other    Anxiety - Primary    Relevant Orders    Urine Drug Screen - Urine, Clean Catch    Depression    Relevant Orders    Urine Drug Screen - Urine, Clean Catch         No orders of the defined types " were placed in this encounter.      It's not just what you eat, but when you eat  Eat breakfast, and eat smaller meals throughout the day. A healthy breakfast can jumpstart your metabolism, while eating small, healthy meals (rather than the standard three large meals) keeps your energy up.   Avoid eating at night. Try to eat dinner earlier and fast for 14-16 hours until breakfast the next morning. Studies suggest that eating only when you’re most active and giving your digestive system a long break each day may help to regulate weight.

## 2017-10-02 ENCOUNTER — TELEPHONE (OUTPATIENT)
Dept: FAMILY MEDICINE CLINIC | Facility: CLINIC | Age: 37
End: 2017-10-02

## 2017-10-02 ENCOUNTER — APPOINTMENT (OUTPATIENT)
Dept: LAB | Facility: HOSPITAL | Age: 37
End: 2017-10-02

## 2017-10-02 LAB
AMPHET+METHAMPHET UR QL: NEGATIVE
BARBITURATES UR QL SCN: NEGATIVE
BENZODIAZ UR QL SCN: NEGATIVE
CANNABINOIDS SERPL QL: NEGATIVE
COCAINE UR QL: NEGATIVE
METHADONE UR QL SCN: NEGATIVE
OPIATES UR QL: NEGATIVE
OXYCODONE UR QL SCN: NEGATIVE

## 2017-10-02 PROCEDURE — 80307 DRUG TEST PRSMV CHEM ANLYZR: CPT | Performed by: NURSE PRACTITIONER

## 2017-10-02 RX ORDER — CLONAZEPAM 1 MG/1
1 TABLET ORAL 4 TIMES DAILY PRN
Qty: 120 TABLET | Refills: 0 | OUTPATIENT
Start: 2017-10-02 | End: 2017-12-08 | Stop reason: SDUPTHER

## 2017-10-02 RX ORDER — GABAPENTIN 600 MG/1
600 TABLET ORAL 3 TIMES DAILY
Qty: 90 TABLET | Refills: 2 | OUTPATIENT
Start: 2017-10-02 | End: 2017-12-08 | Stop reason: SDUPTHER

## 2017-10-02 RX ORDER — GABAPENTIN 400 MG/1
400 CAPSULE ORAL 3 TIMES DAILY
Qty: 90 CAPSULE | Refills: 2 | OUTPATIENT
Start: 2017-10-02 | End: 2017-12-08 | Stop reason: SDUPTHER

## 2017-10-02 NOTE — TELEPHONE ENCOUNTER
JELLY SHEPARD CAME TO LAB THIS MORNING AND DID HIS U.A....NOW HE IS NEEDING PRESPS OF THE NEUROTIN 600MG AND 400MG AND HIS KLONIPIN

## 2017-11-01 ENCOUNTER — OFFICE VISIT (OUTPATIENT)
Dept: FAMILY MEDICINE CLINIC | Facility: CLINIC | Age: 37
End: 2017-11-01

## 2017-11-01 ENCOUNTER — APPOINTMENT (OUTPATIENT)
Dept: LAB | Facility: HOSPITAL | Age: 37
End: 2017-11-01

## 2017-11-01 VITALS
DIASTOLIC BLOOD PRESSURE: 62 MMHG | WEIGHT: 149 LBS | SYSTOLIC BLOOD PRESSURE: 102 MMHG | HEIGHT: 69 IN | BODY MASS INDEX: 22.07 KG/M2

## 2017-11-01 DIAGNOSIS — L03.90 WOUND CELLULITIS: Primary | ICD-10-CM

## 2017-11-01 PROCEDURE — 99213 OFFICE O/P EST LOW 20 MIN: CPT | Performed by: NURSE PRACTITIONER

## 2017-11-01 PROCEDURE — 87070 CULTURE OTHR SPECIMN AEROBIC: CPT | Performed by: NURSE PRACTITIONER

## 2017-11-01 PROCEDURE — 87205 SMEAR GRAM STAIN: CPT | Performed by: NURSE PRACTITIONER

## 2017-11-01 PROCEDURE — 87186 SC STD MICRODIL/AGAR DIL: CPT | Performed by: NURSE PRACTITIONER

## 2017-11-01 PROCEDURE — 87077 CULTURE AEROBIC IDENTIFY: CPT | Performed by: NURSE PRACTITIONER

## 2017-11-01 PROCEDURE — 87147 CULTURE TYPE IMMUNOLOGIC: CPT | Performed by: NURSE PRACTITIONER

## 2017-11-01 RX ORDER — SULFAMETHOXAZOLE AND TRIMETHOPRIM 800; 160 MG/1; MG/1
1 TABLET ORAL 2 TIMES DAILY
Qty: 20 TABLET | Refills: 0 | Status: SHIPPED | OUTPATIENT
Start: 2017-11-01 | End: 2018-04-17 | Stop reason: HOSPADM

## 2017-11-01 NOTE — PROGRESS NOTES
Chief Complaint   Patient presents with   • Ankle Pain     sore on left ankle x 4 days     Subjective   Jean Peñaloza is a 37 y.o. male.     HPI Comments: Injured leg on a dog chain  4 days ago-became swollen afterwards-now draining and red     Abrasion   This is a new problem. The current episode started in the past 7 days. The problem occurs constantly. The problem has been gradually worsening. Associated symptoms include arthralgias. Pertinent negatives include no abdominal pain, change in bowel habit, chest pain, chills, congestion, coughing, diaphoresis, fatigue, fever, headaches, joint swelling, myalgias, nausea, neck pain, numbness, rash, sore throat, swollen glands, urinary symptoms, vertigo or weakness. Nothing aggravates the symptoms. He has tried rest for the symptoms.        The following portions of the patient's history were reviewed and updated as appropriate: allergies, current medications, past social history and problem list.    Review of Systems   Constitutional: Positive for activity change, appetite change and unexpected weight change. Negative for chills, diaphoresis, fatigue and fever.        Decreased appetite reported -unable to eat regular due to therapy scheduled 4 days a week in [place]    HENT: Negative for congestion, dental problem, drooling, sore throat and trouble swallowing.    Eyes: Positive for visual disturbance. Negative for photophobia, pain, discharge, redness and itching.   Respiratory: Negative.  Negative for apnea, cough, choking, chest tightness, shortness of breath and stridor.    Cardiovascular: Positive for palpitations. Negative for chest pain and leg swelling.   Gastrointestinal: Negative.  Negative for abdominal distention, abdominal pain, change in bowel habit and nausea.   Endocrine: Negative.  Negative for cold intolerance, heat intolerance, polydipsia, polyphagia and polyuria.   Genitourinary: Positive for difficulty urinating. Negative for decreased  "urine volume, discharge, dysuria, enuresis, flank pain, frequency, genital sores, hematuria, penile pain, penile swelling, scrotal swelling, testicular pain and urgency.        ED due to injury    Musculoskeletal: Positive for arthralgias, back pain, gait problem and neck stiffness. Negative for joint swelling, myalgias and neck pain.        Wheelchair for transfer    Skin: Negative.  Negative for rash.        Skin lesion left lower leg -red and irritated -yellow draining -peeling borders.    Allergic/Immunologic: Negative.  Negative for environmental allergies, food allergies and immunocompromised state.   Neurological: Positive for speech difficulty. Negative for dizziness, vertigo, tremors, seizures, syncope, facial asymmetry, weakness, light-headedness, numbness and headaches.        Left sided weakness from c spine injury    Hematological: Negative.    Psychiatric/Behavioral: Positive for agitation, decreased concentration, dysphoric mood and sleep disturbance. Negative for behavioral problems, confusion, hallucinations, self-injury and suicidal ideas. The patient is nervous/anxious. The patient is not hyperactive.    All other systems reviewed and are negative.      Objective   /62  Ht 69\" (175.3 cm)  Wt 149 lb (67.6 kg)  BMI 22 kg/m2  Physical Exam   Constitutional: He is oriented to person, place, and time. He appears well-developed and well-nourished. No distress.   HENT:   Head: Normocephalic and atraumatic.   Mouth/Throat: No oropharyngeal exudate.   Eyes: EOM are normal. Pupils are equal, round, and reactive to light. Right eye exhibits no discharge. Left eye exhibits no discharge. No scleral icterus.   Neck: Normal range of motion. Neck supple. No JVD present. No tracheal deviation present. No thyromegaly present.   Cardiovascular: Normal rate.  Exam reveals no gallop and no friction rub.    No murmur heard.  Pulmonary/Chest: Effort normal and breath sounds normal. No stridor. No respiratory " distress. He has no wheezes. He has no rales. He exhibits no tenderness.   Abdominal: Soft. Bowel sounds are normal. He exhibits no distension and no mass. There is tenderness. There is no rebound and no guarding. No hernia.   Musculoskeletal: He exhibits tenderness. He exhibits no edema or deformity.   Wheelchair bound-right sided weakness, decreased rom on right side, left sided numbness    Lymphadenopathy:     He has no cervical adenopathy.   Neurological: He is alert and oriented to person, place, and time. He has normal reflexes. He displays normal reflexes. No cranial nerve deficit. He exhibits normal muscle tone. Coordination normal.   Skin: Skin is warm and dry. No rash noted. He is not diaphoretic. No erythema. No pallor.   Skin tear left lower anterior leg-dark irregular center-peeling around borders    Nursing note and vitals reviewed.      Assessment/Plan   Problem List Items Addressed This Visit        Other    Wound cellulitis - Primary    Relevant Orders    Wound Culture - Wound, Ankle, Left    Ambulatory Referral to Wound Clinic           New Medications Ordered This Visit   Medications   • mupirocin (BACTROBAN) 2 % ointment     Sig: Apply  topically 2 (Two) Times a Day.     Dispense:  30 g     Refill:  5   • sulfamethoxazole-trimethoprim (BACTRIM DS) 800-160 MG per tablet     Sig: Take 1 tablet by mouth 2 (Two) Times a Day.     Dispense:  20 tablet     Refill:  0     meds for now-monitor wound for sings of continued infection-keep area clean and dry-wound needs to be debrided-will refer to wound care for follow up     It's not just what you eat, but when you eat  Eat breakfast, and eat smaller meals throughout the day. A healthy breakfast can jumpstart your metabolism, while eating small, healthy meals (rather than the standard three large meals) keeps your energy up.   Avoid eating at night. Try to eat dinner earlier and fast for 14-16 hours until breakfast the next morning. Studies suggest that  eating only when you’re most active and giving your digestive system a long break each day may help to regulate weight.   Increase protein, diet discussed in length

## 2017-11-04 LAB
BACTERIA SPEC AEROBE CULT: ABNORMAL
BACTERIA SPEC AEROBE CULT: ABNORMAL
GRAM STN SPEC: ABNORMAL
GRAM STN SPEC: ABNORMAL

## 2017-11-06 ENCOUNTER — APPOINTMENT (OUTPATIENT)
Dept: WOUND CARE | Facility: HOSPITAL | Age: 37
End: 2017-11-06
Attending: THORACIC SURGERY (CARDIOTHORACIC VASCULAR SURGERY)

## 2017-11-13 ENCOUNTER — APPOINTMENT (OUTPATIENT)
Dept: WOUND CARE | Facility: HOSPITAL | Age: 37
End: 2017-11-13
Attending: THORACIC SURGERY (CARDIOTHORACIC VASCULAR SURGERY)

## 2017-11-16 ENCOUNTER — APPOINTMENT (OUTPATIENT)
Dept: WOUND CARE | Facility: HOSPITAL | Age: 37
End: 2017-11-16

## 2017-11-16 PROCEDURE — G0463 HOSPITAL OUTPT CLINIC VISIT: HCPCS

## 2017-11-16 RX ORDER — VENLAFAXINE HYDROCHLORIDE 150 MG/1
150 CAPSULE, EXTENDED RELEASE ORAL 2 TIMES DAILY
Qty: 60 CAPSULE | Refills: 5 | Status: SHIPPED | OUTPATIENT
Start: 2017-11-16 | End: 2018-04-17 | Stop reason: HOSPADM

## 2017-12-07 ENCOUNTER — APPOINTMENT (OUTPATIENT)
Dept: WOUND CARE | Facility: HOSPITAL | Age: 37
End: 2017-12-07

## 2017-12-07 PROCEDURE — G0463 HOSPITAL OUTPT CLINIC VISIT: HCPCS

## 2017-12-08 ENCOUNTER — OFFICE VISIT (OUTPATIENT)
Dept: FAMILY MEDICINE CLINIC | Facility: CLINIC | Age: 37
End: 2017-12-08

## 2017-12-08 VITALS
BODY MASS INDEX: 22.51 KG/M2 | HEIGHT: 69 IN | SYSTOLIC BLOOD PRESSURE: 110 MMHG | WEIGHT: 152 LBS | DIASTOLIC BLOOD PRESSURE: 70 MMHG

## 2017-12-08 DIAGNOSIS — G83.81 HEMIPARAPLEGIC SYNDROME (HCC): ICD-10-CM

## 2017-12-08 DIAGNOSIS — G83.81 BROWN-SEQUARD SYNDROME (HCC): Primary | ICD-10-CM

## 2017-12-08 DIAGNOSIS — M79.10 MUSCLE PAIN: ICD-10-CM

## 2017-12-08 DIAGNOSIS — M54.2 CERVICAL PAIN (NECK): ICD-10-CM

## 2017-12-08 PROCEDURE — 99213 OFFICE O/P EST LOW 20 MIN: CPT | Performed by: NURSE PRACTITIONER

## 2017-12-08 RX ORDER — CLONAZEPAM 1 MG/1
1 TABLET ORAL 4 TIMES DAILY PRN
Qty: 120 TABLET | Refills: 0 | Status: SHIPPED | OUTPATIENT
Start: 2017-12-08 | End: 2018-01-17 | Stop reason: SDUPTHER

## 2017-12-08 RX ORDER — GABAPENTIN 600 MG/1
600 TABLET ORAL 3 TIMES DAILY
Qty: 90 TABLET | Refills: 2 | Status: SHIPPED | OUTPATIENT
Start: 2017-12-08 | End: 2018-04-17 | Stop reason: HOSPADM

## 2017-12-08 RX ORDER — GABAPENTIN 400 MG/1
400 CAPSULE ORAL 3 TIMES DAILY
Qty: 90 CAPSULE | Refills: 2 | Status: SHIPPED | OUTPATIENT
Start: 2017-12-08 | End: 2018-04-17 | Stop reason: HOSPADM

## 2017-12-14 ENCOUNTER — APPOINTMENT (OUTPATIENT)
Dept: WOUND CARE | Facility: HOSPITAL | Age: 37
End: 2017-12-14

## 2017-12-14 PROCEDURE — G0463 HOSPITAL OUTPT CLINIC VISIT: HCPCS

## 2018-01-17 ENCOUNTER — OFFICE VISIT (OUTPATIENT)
Dept: FAMILY MEDICINE CLINIC | Facility: CLINIC | Age: 38
End: 2018-01-17

## 2018-01-17 VITALS
SYSTOLIC BLOOD PRESSURE: 132 MMHG | HEIGHT: 69 IN | BODY MASS INDEX: 23.25 KG/M2 | WEIGHT: 157 LBS | DIASTOLIC BLOOD PRESSURE: 82 MMHG

## 2018-01-17 DIAGNOSIS — M79.10 MUSCLE PAIN: ICD-10-CM

## 2018-01-17 DIAGNOSIS — M25.511 ACUTE PAIN OF RIGHT SHOULDER: Primary | ICD-10-CM

## 2018-01-17 DIAGNOSIS — F41.9 ANXIETY: ICD-10-CM

## 2018-01-17 DIAGNOSIS — G83.81 HEMIPARAPLEGIC SYNDROME (HCC): ICD-10-CM

## 2018-01-17 PROCEDURE — 99213 OFFICE O/P EST LOW 20 MIN: CPT | Performed by: NURSE PRACTITIONER

## 2018-01-17 RX ORDER — CLONAZEPAM 1 MG/1
1 TABLET ORAL 4 TIMES DAILY PRN
Qty: 120 TABLET | Refills: 0 | Status: SHIPPED | OUTPATIENT
Start: 2018-01-17 | End: 2018-04-17 | Stop reason: HOSPADM

## 2018-01-17 NOTE — PROGRESS NOTES
Chief Complaint   Patient presents with   • Shoulder Pain     right shoulde , fell a couple weeks ago    • Med Refill     klonopin     Subjective   Jean Peñaloza is a 37 y.o. male.     HPI Comments: Presents with fall out of his shower 2 weeks ago-not able to move his right arm-or raise above his head-hx of c spine injury with cord damage-is right handed.     Anxiety   Presents for follow-up visit. Symptoms include decreased concentration, excessive worry, irritability, nervous/anxious behavior, obsessions, palpitations and panic. Patient reports no chest pain, compulsions, confusion, depressed mood, dizziness, dry mouth, feeling of choking, hyperventilation, impotence, insomnia, malaise, muscle tension, nausea, restlessness, shortness of breath or suicidal ideas. Symptoms occur constantly. The severity of symptoms is moderate. The quality of sleep is good. Nighttime awakenings: none.     Compliance with medications is %.   Neck Pain    This is a new (Dec 17th 2016) problem. The current episode started more than 1 month ago. The problem occurs constantly. The problem has been gradually worsening. The pain is associated with a fall. The pain is present in the anterior neck. The quality of the pain is described as aching. The pain is at a severity of 10/10. The pain is severe. The pain is same all the time. Stiffness is present all day. Associated symptoms include headaches, numbness and weakness. Pertinent negatives include no chest pain, fever, leg pain, pain with swallowing, paresis, photophobia, syncope, tingling, trouble swallowing, visual change or weight loss. Associated symptoms comments: See hospital report and rehab report for further information-has been home from [facility] rehab since last week . He has tried acetaminophen for the symptoms. The treatment provided mild relief.   Shoulder Injury    The incident occurred at home. The right shoulder is affected. The injury mechanism was a fall. The  pain is at a severity of 4/10. The pain is mild. Associated symptoms include numbness. Pertinent negatives include no chest pain, muscle weakness or tingling. The symptoms are aggravated by movement. He has tried acetaminophen for the symptoms. The treatment provided mild relief.        The following portions of the patient's history were reviewed and updated as appropriate: allergies, current medications, past social history and problem list.    Review of Systems   Constitutional: Positive for activity change, appetite change, irritability and unexpected weight change. Negative for chills, diaphoresis, fatigue, fever and weight loss.        Decreased appetite reported -unable to eat regular due to therapy scheduled 4 days a week in [place]    HENT: Negative for congestion, dental problem, drooling and trouble swallowing.    Eyes: Positive for visual disturbance. Negative for photophobia, pain, discharge, redness and itching.   Respiratory: Negative.  Negative for apnea, cough, choking, chest tightness, shortness of breath, wheezing and stridor.    Cardiovascular: Positive for palpitations. Negative for chest pain, leg swelling and syncope.   Gastrointestinal: Negative.  Negative for abdominal distention, abdominal pain, anal bleeding, blood in stool and nausea.   Endocrine: Negative.  Negative for cold intolerance, heat intolerance, polydipsia, polyphagia and polyuria.   Genitourinary: Positive for difficulty urinating. Negative for decreased urine volume, discharge, dysuria, enuresis, flank pain, frequency, genital sores, hematuria, impotence, penile pain, penile swelling, scrotal swelling, testicular pain and urgency.        ED due to injury    Musculoskeletal: Positive for arthralgias, back pain, gait problem, joint swelling, myalgias, neck pain and neck stiffness.        Wheelchair for transfer -right shoulder pain and decrease rom    Skin: Negative.    Allergic/Immunologic: Negative.  Negative for  "environmental allergies, food allergies and immunocompromised state.   Neurological: Positive for speech difficulty, weakness, numbness and headaches. Negative for dizziness, tingling, tremors, seizures, syncope, facial asymmetry and light-headedness.        Left sided weakness from c spine injury -decreased rom right shoulder -pain and numbness with movement -abnormal xray right shoulder    Hematological: Negative.    Psychiatric/Behavioral: Positive for agitation, decreased concentration, dysphoric mood and sleep disturbance. Negative for behavioral problems, confusion, hallucinations, self-injury and suicidal ideas. The patient is nervous/anxious. The patient does not have insomnia and is not hyperactive.    All other systems reviewed and are negative.      Objective   /82  Ht 175.3 cm (69\")  Wt 71.2 kg (157 lb)  BMI 23.18 kg/m2  Physical Exam   Constitutional: He is oriented to person, place, and time. He appears well-developed and well-nourished. No distress.   HENT:   Head: Normocephalic and atraumatic.   Mouth/Throat: No oropharyngeal exudate.   Eyes: EOM are normal. Pupils are equal, round, and reactive to light. Right eye exhibits no discharge. Left eye exhibits no discharge. No scleral icterus.   Neck: Normal range of motion. Neck supple. No JVD present. No tracheal deviation present. No thyromegaly present.   Cardiovascular: Normal rate.  Exam reveals no gallop and no friction rub.    No murmur heard.  Pulmonary/Chest: Effort normal and breath sounds normal. No stridor. No respiratory distress. He has no wheezes. He has no rales. He exhibits no tenderness.   Abdominal: Soft. Bowel sounds are normal. He exhibits no distension and no mass. There is tenderness. There is no rebound and no guarding. No hernia.   Musculoskeletal: He exhibits tenderness. He exhibits no edema or deformity.        Right shoulder: He exhibits decreased range of motion, tenderness, bony tenderness, pain, spasm and decreased " strength. He exhibits no swelling, no effusion, no crepitus, no deformity, no laceration and normal pulse.        Arms:  Wheelchair bound-right sided weakness, decreased rom on right side, left sided numbness    Lymphadenopathy:     He has no cervical adenopathy.   Neurological: He is alert and oriented to person, place, and time. He has normal reflexes. He displays normal reflexes. No cranial nerve deficit. He exhibits normal muscle tone. Coordination normal.   Skin: Skin is warm and dry. No rash noted. He is not diaphoretic. No erythema. No pallor.   Skin tear left lower anterior leg-dark irregular center-peeling around borders    Nursing note and vitals reviewed.      Assessment/Plan   Problem List Items Addressed This Visit        Nervous and Auditory    Hemiparaplegic syndrome    Muscle pain    Acute pain of right shoulder - Primary    Relevant Orders    XR Shoulder 2+ View Right (Completed)    XR clavicle right (Completed)    MRI Shoulder Right Without Contrast       Other    Anxiety      XR Shoulder 2+ View Right [[id number]] (Order [id number])   Status: Final result   Appointment Information   PACS Images   Radiology Images   Study Result   Procedure:  Right shoulder. Right clavicle.         Indication:  Pain.   .     Technique:  Two views right clavicle. Three views right shoulder.        Prior relevant exam:  None.        Mild arthritic changes acromioclavicular joint. Distal clavicular  spur projecting superiorly.     The clavicular shaft is  unremarkable.     Normal glenohumeral joint. Soft tissue calcifications adjacent to  the humeral head probably calcific tendinitis.         IMPRESSION:  CONCLUSION:   1. Mild degenerative changes right acromioclavicular joint. Right  clavicle is otherwise unremarkable.  2. Mild degenerative changes right acromioclavicular joint. Soft  tissue calcifications adjacent to the humeral head probably  calcific tendinitis/peritendinitis. Right shoulder is  otherwise  unremarkable.     Electronically signed by:  Felton Schmitt MD  1/17/2018 9:12 AM CST  Workstation: [email or web address]   Imaging        New Medications Ordered This Visit   Medications   • clonazePAM (KLONOPIN) 1 MG tablet     Sig: Take 1 tablet by mouth 4 (Four) Times a Day As Needed for Anxiety.     Dispense:  120 tablet     Refill:  0      meds as directed     Patient understands the risks associated with this controlled medication, including tolerance and addiction.  he also agrees to only obtain this medication from me, and not from a another provider, unless that provider is covering for me in my absence.  he also agrees to be compliant in dosing, and not self adjust the dose of medication.  A signed controlled substance agreement is on file, and he has received a controlled substance education sheet at this a previous visit.  he has also signed a consent for treatment with a controlled substance as per [facility] policy. MAN was obtained.      uds utd-meds as directed, mri right shoulder and follow up with ortho

## 2018-01-24 ENCOUNTER — TELEPHONE (OUTPATIENT)
Dept: FAMILY MEDICINE CLINIC | Facility: CLINIC | Age: 38
End: 2018-01-24

## 2018-01-24 DIAGNOSIS — M25.511 ACUTE PAIN OF RIGHT SHOULDER: Primary | ICD-10-CM

## 2018-02-01 ENCOUNTER — HOSPITAL ENCOUNTER (OUTPATIENT)
Dept: MRI IMAGING | Facility: HOSPITAL | Age: 38
Discharge: HOME | End: 2018-02-01
Admitting: NURSE PRACTITIONER

## 2018-02-01 PROCEDURE — 73221 MRI JOINT UPR EXTREM W/O DYE: CPT

## 2018-02-02 DIAGNOSIS — S42.291S HUMERAL HEAD FRACTURE, RIGHT, SEQUELA: Primary | ICD-10-CM

## 2018-02-02 RX ORDER — ACETAMINOPHEN AND CODEINE PHOSPHATE 300; 30 MG/1; MG/1
1 TABLET ORAL EVERY 4 HOURS PRN
Qty: 90 TABLET | Refills: 0 | Status: SHIPPED | OUTPATIENT
Start: 2018-02-02 | End: 2018-04-17 | Stop reason: HOSPADM

## 2018-02-07 ENCOUNTER — OFFICE VISIT (OUTPATIENT)
Dept: ORTHOPEDIC SURGERY | Facility: CLINIC | Age: 38
End: 2018-02-07

## 2018-02-07 VITALS — BODY MASS INDEX: 21.62 KG/M2 | WEIGHT: 146 LBS | HEIGHT: 69 IN

## 2018-02-07 DIAGNOSIS — G83.81 HEMIPARAPLEGIC SYNDROME (HCC): ICD-10-CM

## 2018-02-07 DIAGNOSIS — S42.254A CLOSED NONDISPLACED FRACTURE OF GREATER TUBEROSITY OF RIGHT HUMERUS, INITIAL ENCOUNTER: Primary | ICD-10-CM

## 2018-02-07 DIAGNOSIS — M25.511 ACUTE PAIN OF RIGHT SHOULDER: ICD-10-CM

## 2018-02-07 PROBLEM — S42.291A CLOSED FRACTURE OF HEAD OF RIGHT HUMERUS: Status: ACTIVE | Noted: 2018-02-07

## 2018-02-07 PROBLEM — M67.80 TENDONOSIS: Status: ACTIVE | Noted: 2018-02-07

## 2018-02-07 PROCEDURE — 23665 CLTX SHO DSLC FX GR HMRL TBR: CPT | Performed by: NURSE PRACTITIONER

## 2018-02-07 PROCEDURE — 99214 OFFICE O/P EST MOD 30 MIN: CPT | Performed by: NURSE PRACTITIONER

## 2018-02-07 NOTE — PROGRESS NOTES
"Jean Peñaloza is a 37 y.o. male   Primary provider:  REG Lee       Chief Complaint   Patient presents with   • Right Shoulder - Consult       HISTORY OF PRESENT ILLNESS: Patient is here for consult- humeral head FX. Patient was referred to our office by REG Vásquez. Patient states that his primary care physician has obtained xray's and MRI of the RT shoulder. Patient states injury occurred early January. Patient states he fell out of his shower and since has not been able to raise his right upper extremity. Patient describes the pain as \"constant, sharp burning\". Patient has tried various NSAIDS and narcotic pain medication.     History of Present Illness     CONCURRENT MEDICAL HISTORY:    Past Medical History:   Diagnosis Date   • Acute bronchitis    • Anxiety    • Cluster headache 12/02/2015   • Conjunctivitis 08/26/2014   • Depression    • Dorsalgia    • Malaise    • Panic disorder    • Shoulder pain     severe on right          No Known Allergies      Current Outpatient Prescriptions:   •  acetaminophen-codeine (TYLENOL #3) 300-30 MG per tablet, Take 1 tablet by mouth Every 4 (Four) Hours As Needed for Moderate Pain ., Disp: 90 tablet, Rfl: 0  •  baclofen (LIORESAL) 10 MG tablet, Take 0.5 tablets by mouth 3 (Three) Times a Day., Disp: 90 tablet, Rfl: 5  •  clonazePAM (KLONOPIN) 1 MG tablet, Take 1 tablet by mouth 4 (Four) Times a Day As Needed for Anxiety., Disp: 120 tablet, Rfl: 0  •  docusate sodium (COLACE) 100 MG capsule, Take 1 capsule by mouth 2 (Two) Times a Day., Disp: 60 capsule, Rfl: 5  •  gabapentin (NEURONTIN) 400 MG capsule, Take 1 capsule by mouth 3 (Three) Times a Day., Disp: 90 capsule, Rfl: 2  •  gabapentin (NEURONTIN) 600 MG tablet, Take 1 tablet by mouth 3 (Three) Times a Day., Disp: 90 tablet, Rfl: 2  •  megestrol (MEGACE) 20 MG tablet, Take 1 tablet by mouth Daily., Disp: 30 tablet, Rfl: 11  •  mupirocin (BACTROBAN) 2 % ointment, Apply  topically 2 (Two) Times a Day., " "Disp: 30 g, Rfl: 5  •  sulfamethoxazole-trimethoprim (BACTRIM DS) 800-160 MG per tablet, Take 1 tablet by mouth 2 (Two) Times a Day., Disp: 20 tablet, Rfl: 0  •  traZODone (DESYREL) 50 MG tablet, Take 1 tablet by mouth Every Night., Disp: 30 tablet, Rfl: 5  •  venlafaxine XR (EFFEXOR-XR) 150 MG 24 hr capsule, Take 1 capsule by mouth 2 (Two) Times a Day., Disp: 60 capsule, Rfl: 5    Past Surgical History:   Procedure Laterality Date   • INJECTION OF MEDICATION  12/02/2015    KENALOG(2)   • NECK SURGERY  12/18/2016    [facility]       Family History   Problem Relation Age of Onset   • Cancer Maternal Grandfather    • Diabetes Maternal Grandfather         Social History     Social History   • Marital status:      Spouse name: N/A   • Number of children: N/A   • Years of education: N/A     Occupational History   • Not on file.     Social History Main Topics   • Smoking status: Current Every Day Smoker     Types: Cigarettes     Last attempt to quit: 8/14/2014   • Smokeless tobacco: Never Used      Comment: Smoking cessation information given   • Alcohol use No   • Drug use: No   • Sexual activity: Defer     Other Topics Concern   • Not on file     Social History Narrative        Review of Systems   Constitutional: Positive for activity change.   HENT: Negative.    Eyes: Negative.    Respiratory: Negative.    Cardiovascular: Negative.    Gastrointestinal: Negative.    Endocrine: Negative.    Genitourinary: Negative.    Musculoskeletal: Positive for neck pain and neck stiffness.   Skin: Negative.    Allergic/Immunologic: Negative.    Neurological: Positive for weakness and numbness.   Hematological: Negative.    Psychiatric/Behavioral: Negative.        PHYSICAL EXAMINATION:       Ht 175.3 cm (69\")  Wt 66.2 kg (146 lb)  BMI 21.56 kg/m2    Physical Exam   Constitutional: He is oriented to person, place, and time. Vital signs are normal. He appears well-developed and well-nourished. He is cooperative.   HENT:   Head: " Normocephalic and atraumatic.   Neck: Trachea normal and phonation normal.   Pulmonary/Chest: Effort normal. No respiratory distress.   Abdominal: Soft. Normal appearance. He exhibits no distension.   Neurological: He is alert and oriented to person, place, and time. GCS eye subscore is 4. GCS verbal subscore is 5. GCS motor subscore is 6.   Skin: Skin is warm, dry and intact.   Psychiatric: He has a normal mood and affect. His speech is normal and behavior is normal. Judgment and thought content normal. Cognition and memory are normal.   Vitals reviewed.      GAIT:     [x]  Normal  []  Antalgic    Assistive device: [x]  None  []  Walker     []  Crutches  []  Cane     []  Wheelchair  []  Stretcher    Right Shoulder Exam     Tenderness   The patient is experiencing tenderness in the acromioclavicular joint (proximal humerus).    Other   Erythema: absent  Scars: absent  Sensation: normal  Pulse: present    Comments:  Motion and strength deferred, fx.       Left Shoulder Exam   Left shoulder exam is normal.              Xr Clavicle Right    Result Date: 1/17/2018  Narrative: Procedure:  Right shoulder. Right clavicle.    Indication:  Pain.   . Technique:  Two views right clavicle. Three views right shoulder. Prior relevant exam:  None. Mild arthritic changes acromioclavicular joint. Distal clavicular spur projecting superiorly. The clavicular shaft is  unremarkable. Normal glenohumeral joint. Soft tissue calcifications adjacent to the humeral head probably calcific tendinitis.       Impression: CONCLUSION: 1. Mild degenerative changes right acromioclavicular joint. Right clavicle is otherwise unremarkable. 2. Mild degenerative changes right acromioclavicular joint. Soft tissue calcifications adjacent to the humeral head probably calcific tendinitis/peritendinitis. Right shoulder is otherwise unremarkable. Electronically signed by:  Felton Schmitt MD  1/17/2018 9:12 AM [facility] Workstation: [id number]    Xr Shoulder 2+ View  Right    Result Date: 1/17/2018  Narrative: Procedure:  Right shoulder. Right clavicle.    Indication:  Pain.   . Technique:  Two views right clavicle. Three views right shoulder. Prior relevant exam:  None. Mild arthritic changes acromioclavicular joint. Distal clavicular spur projecting superiorly. The clavicular shaft is  unremarkable. Normal glenohumeral joint. Soft tissue calcifications adjacent to the humeral head probably calcific tendinitis.       Impression: CONCLUSION: 1. Mild degenerative changes right acromioclavicular joint. Right clavicle is otherwise unremarkable. 2. Mild degenerative changes right acromioclavicular joint. Soft tissue calcifications adjacent to the humeral head probably calcific tendinitis/peritendinitis. Right shoulder is otherwise unremarkable. Electronically signed by:  Felton Schmitt MD  1/17/2018 9:12 AM CST Workstation: [id number]    Mri Shoulder Right Without Contrast    Result Date: 2/1/2018  Narrative: MRI of the right shoulder without contrast HISTORY: Right shoulder pain and limited range of motion x1 month. Multisequence multiplanar images of the right shoulder were obtained without contrast. COMPARISON: None. Correlation with radiographs of January 17, 2018 Nondisplaced fracture greater tuberosity humeral head. Bone bruise involving at least one half of the humeral head. Minimal supraspinatus tendinosis. No evidence of tear of the supraspinatus tendon. No impingement. The subscapularis tendon is intact. The glenoid labrum appears intact. The bicipital tendon lies in the bicipital tendon groove. No joint effusion.     Impression: CONCLUSION: Nondisplaced fracture greater tuberosity humeral head. Bone bruise involving at least one half of the humeral head laterally. Minimal supraspinatus tendinosis. [id number] Electronically signed by:  Uriah Cutler MD  2/1/2018 7:13 PM CST Workstation: [email or web address]          ASSESSMENT:    Diagnoses and all orders for this visit:    Closed  nondisplaced fracture of greater tuberosity of right humerus, initial encounter  -     Ambulatory Referral to Physical Therapy Evaluate and treat    Acute pain of right shoulder  -     Ambulatory Referral to Physical Therapy Evaluate and treat    Hemiparaplegic syndrome  -     Ambulatory Referral to Physical Therapy Evaluate and treat          PLAN  Recommend conservative treatment of greater tuberosity fracture and a sling.  Patient was shown some home exercises to start performing in the office and I recommended a course of physical therapy with passive range of motion exercises guided by the therapist.  Follow-up in 4-6 weeks for recheck and repeat x-ray.  No Follow-up on file.    Pawel Howell, APRN

## 2018-03-01 DIAGNOSIS — S42.254A CLOSED NONDISPLACED FRACTURE OF GREATER TUBEROSITY OF RIGHT HUMERUS, INITIAL ENCOUNTER: Primary | ICD-10-CM

## 2018-03-05 ENCOUNTER — OFFICE VISIT (OUTPATIENT)
Dept: ORTHOPEDIC SURGERY | Facility: CLINIC | Age: 38
End: 2018-03-05

## 2018-03-05 DIAGNOSIS — M25.511 ACUTE PAIN OF RIGHT SHOULDER: ICD-10-CM

## 2018-03-05 DIAGNOSIS — S42.254D CLOSED NONDISPLACED FRACTURE OF GREATER TUBEROSITY OF RIGHT HUMERUS WITH ROUTINE HEALING, SUBSEQUENT ENCOUNTER: Primary | ICD-10-CM
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
## 2018-03-05 PROCEDURE — 99024 POSTOP FOLLOW-UP VISIT: CPT | Performed by: ORTHOPAEDIC SURGERY

## 2018-03-05 RX ORDER — BUPROPION HYDROCHLORIDE 150 MG/1
150 TABLET, EXTENDED RELEASE ORAL 2 TIMES DAILY
COMMUNITY
End: 2018-04-17 | Stop reason: HOSPADM

## 2018-03-05 NOTE — PROGRESS NOTES
Jean Peñaloza is a 37 y.o. male returns for     Chief Complaint   Patient presents with   • Right Shoulder - Pain, Fall       HISTORY OF PRESENT ILLNESS: patient seen previously by RICCARDO Howell on 2/7/2018, patient fell out of the shower early January was referred by his primary care provider VIV Packer.   Pawel recommended  conservative treatment of greater tuberosity fracture and a sling.  Patient was shown some home exercises to start performing in the office recommended a course of physical therapy with passive range of motion exercises guided by the therapist.  Patient did not attend physical therapy he said that he has been doing home exercises. Patient states that he is not doing any better. xrays done today. He and his family members say he is getting stiffer than his right side.  He has an old neurologic injury with some spasticity of the right upper extremity.     CONCURRENT MEDICAL HISTORY:    Past Medical History:   Diagnosis Date   • Acute bronchitis    • Anxiety    • Cluster headache 12/02/2015   • Conjunctivitis 08/26/2014   • Depression    • Dorsalgia    • Malaise    • Panic disorder    • Shoulder pain     severe on right          No Known Allergies      Current Outpatient Prescriptions:   •  acetaminophen-codeine (TYLENOL #3) 300-30 MG per tablet, Take 1 tablet by mouth Every 4 (Four) Hours As Needed for Moderate Pain ., Disp: 90 tablet, Rfl: 0  •  baclofen (LIORESAL) 10 MG tablet, Take 0.5 tablets by mouth 3 (Three) Times a Day., Disp: 90 tablet, Rfl: 5  •  buPROPion SR (WELLBUTRIN SR) 150 MG 12 hr tablet, Take 150 mg by mouth 2 (Two) Times a Day., Disp: , Rfl:   •  clonazePAM (KLONOPIN) 1 MG tablet, Take 1 tablet by mouth 4 (Four) Times a Day As Needed for Anxiety., Disp: 120 tablet, Rfl: 0  •  docusate sodium (COLACE) 100 MG capsule, Take 1 capsule by mouth 2 (Two) Times a Day., Disp: 60 capsule, Rfl: 5  •  gabapentin (NEURONTIN) 400 MG capsule, Take 1 capsule by mouth 3 (Three) Times a Day.,  Disp: 90 capsule, Rfl: 2  •  gabapentin (NEURONTIN) 600 MG tablet, Take 1 tablet by mouth 3 (Three) Times a Day., Disp: 90 tablet, Rfl: 2  •  megestrol (MEGACE) 20 MG tablet, Take 1 tablet by mouth Daily., Disp: 30 tablet, Rfl: 11  •  mupirocin (BACTROBAN) 2 % ointment, Apply  topically 2 (Two) Times a Day., Disp: 30 g, Rfl: 5  •  sulfamethoxazole-trimethoprim (BACTRIM DS) 800-160 MG per tablet, Take 1 tablet by mouth 2 (Two) Times a Day., Disp: 20 tablet, Rfl: 0  •  traZODone (DESYREL) 50 MG tablet, Take 1 tablet by mouth Every Night., Disp: 30 tablet, Rfl: 5  •  venlafaxine XR (EFFEXOR-XR) 150 MG 24 hr capsule, Take 1 capsule by mouth 2 (Two) Times a Day., Disp: 60 capsule, Rfl: 5    Past Surgical History:   Procedure Laterality Date   • INJECTION OF MEDICATION  12/02/2015    KENALOG(2)   • NECK SURGERY  12/18/2016    [facility]       ROS  No fevers or chills.  No chest pain or shortness of air.  No GI or  disturbances.    PHYSICAL EXAMINATION:       There were no vitals taken for this visit.    Physical Exam   Constitutional: He is oriented to person, place, and time. He appears well-developed and well-nourished.   HENT:   Head: Normocephalic and atraumatic.   Eyes: EOM are normal. Pupils are equal, round, and reactive to light.   Pulmonary/Chest: Effort normal.   Abdominal: Soft.   Musculoskeletal: Normal range of motion.   Neurological: He is alert and oriented to person, place, and time. He exhibits abnormal muscle tone. Coordination abnormal.   Skin: Skin is warm and dry.   Psychiatric: He has a normal mood and affect. Thought content normal.     Good external rotation of the shoulder.  Some pain with abduction.  Supraspinatus stress test is positive,   minimal pain with stressing the subscap.  Pain with abduction, chronic changes in the hand from prior neurologic deficit , capillary refill intact    GAIT:     []  Normal  [x]  Antalgic    Assistive device: [x]  None  []  Walker     []  Crutches  []   Cane     []  Wheelchair  []  Stretcher    Ortho Exam      No results found.    Repeat x-rays today 3 views of the right shoulder show no significant change      ASSESSMENT:    Diagnoses and all orders for this visit:    Closed nondisplaced fracture of greater tuberosity of right humerus with routine healing, subsequent encounter  -     Ambulatory Referral to Physical Therapy Evaluate and treat    Acute pain of right shoulder  -     Ambulatory Referral to Physical Therapy Evaluate and treat    Fall, initial encounter  -     Ambulatory Referral to Physical Therapy Evaluate and treat    Other orders  -     buPROPion SR (WELLBUTRIN SR) 150 MG 12 hr tablet; Take 150 mg by mouth 2 (Two) Times a Day.          PLAN I reiterated the importance of physical therapy which I have scheduled for him today.  I think that will address her chief complaint he is only 2 months out I have explained to him I expect this will probably take another 2 months minimum to heal.  I'll see him back in a month to check his progress.    No Follow-up on file.    Soren Barrientos MD

## 2018-03-13 ENCOUNTER — HOSPITAL ENCOUNTER (OUTPATIENT)
Dept: PHYSICAL THERAPY | Facility: HOSPITAL | Age: 38
Setting detail: THERAPIES SERIES
Discharge: HOME | End: 2018-03-13

## 2018-03-13 DIAGNOSIS — M25.511 ACUTE PAIN OF RIGHT SHOULDER: ICD-10-CM

## 2018-03-13 DIAGNOSIS — S42.254D CLOSED NONDISPLACED FRACTURE OF GREATER TUBEROSITY OF RIGHT HUMERUS WITH ROUTINE HEALING, SUBSEQUENT ENCOUNTER: Primary | ICD-10-CM
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
## 2018-03-13 PROCEDURE — 97161 PT EVAL LOW COMPLEX 20 MIN: CPT

## 2018-03-13 NOTE — THERAPY EVALUATION
Outpatient Physical Therapy Ortho Initial Evaluation  [facility]     Patient Name: Jean Peñaloza  : 1980  MRN: [id number]  Today's Date: 3/13/2018      Visit Date: 2018     Insurance [employer] [place]   Visit #  (authorization needed)   Next MD Visit none   Recert Date 4/3/18     R humerus greater tuberosity fx on 2018      Patient Active Problem List   Diagnosis   • Anxiety   • Depression   • Laceration of thumb, right   • Dependence on wheelchair   • Brown-Sequard syndrome   • Cervical pain (neck)   • Hemiparaplegic syndrome   • Muscle spasm   • Sternum pain   • Arthralgia   • Decreased appetite   • Wound cellulitis   • Muscle pain   • Acute pain of right shoulder   • Closed fracture of head of right humerus   • Tendonosis   • Nondisplaced fracture of greater tuberosity of right humerus with routine healing   • Fall        Past Medical History:   Diagnosis Date   • Acute bronchitis    • Anxiety    • Cluster headache 2015   • Conjunctivitis 2014   • Depression    • Dorsalgia    • Malaise    • Panic disorder    • Shoulder pain     severe on right           Past Surgical History:   Procedure Laterality Date   • INJECTION OF MEDICATION  2015    KENALOG(2)   • NECK SURGERY  2016    [facility]       Visit Dx:     ICD-10-CM ICD-9-CM   1. Closed nondisplaced fracture of greater tuberosity of right humerus with routine healing, subsequent encounter S42.254D V54.11   2. Acute pain of right shoulder M25.511 719.41   3. Fall, initial encounter W19.XXXA E888.9             Patient History     Row Name 18 1100             History    Chief Complaint Pain   limited ROM  -WC      Type of Pain Shoulder pain   Right  -      Date Current Problem(s) Began 18  -      Brief Description of Current Complaint Patient reports he was taking a shower and fell onto the floor and hit his R shoulder on 2018. Patient with x-ray confirmed closed nondisplaced fx of greater  tuberosity of R humerus. Patient also reports he feel out of a deer stand on 12/17/16 which resulted in several broken cervical and thoracic vertebra which has resulted in B nerve damage and associated balance and mobility deficits. Per patient report - Brown Sequard syndrome occurred following fall from deer stand. Patient reports limited ROM with his R shoulder. States he no longer has to wear a shoulder sling, and stated the doctor sent him here to work on ROM and strength.. No follow up visit scheduled with MD. Patient reports prior impingement syndrome of R shoulder prior to fall from deer stand.  -      Previous treatment for THIS PROBLEM --   none  -      Patient/Caregiver Goals Return to prior level of function  -      Patient's Rating of General Health Fair   See patient hx  -WC      Hand Dominance right-handed  -      Occupation/sports/leisure activities permanently disabled  -      What clinical tests have you had for this problem? X-ray  -      Results of Clinical Tests Greater tuberosity fracture right shoulder  -         Pain     Pain Location Shoulder   Right  -WC      Pain at Present 0  -      Pain at Best 0  -      Pain at Worst 5  -      Pain Frequency Intermittent  -      Pain Description Stabbing  -      What Performance Factors Make the Current Problem(s) WORSE? movement, laying on R side  -      What Performance Factors Make the Current Problem(s) BETTER? rest  -      Is your sleep disturbed? Yes  -         Fall Risk Assessment    Any falls in the past year: Yes  -      Number of falls reported in the last 12 months 12  -      Factors that contributed to the fall: Lost balance  -         Daily Activities    Pt Participated in POC and Goals Yes  -        User Key  (r) = Recorded By, (t) = Taken By, (c) = Cosigned By    Initials Name Provider Type    IVANA Packer PT Physical Therapist                PT Ortho     Row Name 03/13/18 1100       Subjective  Comments    Subjective Comments See hx  -WC       Precautions and Contraindications    Precautions/Limitations no known precautions/limitations  -WC    Precautions R greater tuberosity of humerus fx; hardware in cervical spine  -WC       Subjective Pain    Able to rate subjective pain? yes  -WC    Pre-Treatment Pain Level 0  -WC    Post-Treatment Pain Level 0  -WC       Posture/Observations    Posture/Observations Comments R shoulder hike  -WC       Quarter Clearing    Quarter Clearing Upper Quarter Clearing  -       Sensory Screen for Light Touch- Upper Quarter Clearing    C4 (posterior shoulder) Diminished  -WC    C5 (lateral upper arm) Diminished  -WC    C6 (tip of thumb) Diminished  -WC    C7 (tip of 3rd finger) Diminished  -WC    C8 (tip of 5th finger) Diminished  -WC    T1 (medial lower arm) Diminished  -WC       Shoulder Girdle Palpation    Subacromial Space Right:;Tender  -WC    Greater Tubercule Right:;Tender;Guarded/taut  -WC    Upper Trap Right:;Tender  -WC    Shoulder Girdle Palpation? Yes  -WC       General ROM    GENERAL ROM COMMENTS L shoulder AROM flex 170 deg,  deg, IR 53 deg, ER 80 deg; R shoulder AROM: flex 74 deg, ABD 68 deg - did not assess IR and ER; R PROM shoulder flex 114 deg  -       General Assessment (Manual Muscle Testing)    Comment, General Manual Muscle Testing (MMT) Assessment L UE MMT 5/5 globally with resting tremor due to previous injury; R UE not assessed due to fx  -WC      User Key  (r) = Recorded By, (t) = Taken By, (c) = Cosigned By    Initials Name Provider Type    IVANA Packer, PT Physical Therapist                      Therapy Education  Education Details: Patient educated on proposed PT POC and goals. Instructed patient on HEP, handout given. Patient also educated on future use of AD to prevent falls and further injury  Given: HEP, Symptoms/condition management, Posture/body mechanics  Program: New  How Provided: Verbal, Written  Provided to: Patient  Level  of Understanding: Verbalized           PT OP Goals     Row Name 03/13/18 1200          PT Short Term Goals    STG Date to Achieve 04/03/18  -     STG 1 Patient will improve R shoulder flexion PROM from 114 deg to 140 deg  -     STG 2 Patient will report a 50% subjective improvement  -     STG 3 Patient will report pain </= 3/10 with R shoulder movement  -        Long Term Goals    LTG Date to Achieve 04/24/18  -     LTG 1 Patient will be independent with HEP  -     LTG 2 Patient will demonstrate R shoulder AROM WFL and no pain with good biomechanics  -     LTG 3 Patient will demonstrate R UE MMT 4+/5 all planes  -     LTG 4 Patient will improve quickDASH score from 72.73% impairment to < 10% impairment  -     LTG 5 Patient will report no difficulty with lifting objects with R UE  -        Time Calculation    PT Goal Re-Cert Due Date 04/03/18  -       User Key  (r) = Recorded By, (t) = Taken By, (c) = Cosigned By    Initials Name Provider Type    IVANA Packer PT Physical Therapist                PT Assessment/Plan     Row Name 03/13/18 1100          PT Assessment    Functional Limitations Decreased safety during functional activities;Impaired gait;Limitation in home management;Impaired locomotion;Limitations in community activities;Limitations in functional capacity and performance;Performance in leisure activities;Performance in self-care ADL;Performance in work activities  -     Impairments Balance;Coordination;Endurance;Gait;Muscle strength;Motor function;Locomotion;Pain;Peripheral nerve integrity;Poor body mechanics;Posture;Range of motion;Sensation  -     Assessment Comments Patient is a 37 year old male who fx'd R greater tuberosity of humerus on 1/2/18 from a fall in the shower. Patient also with hx of fall from deer stand which resulted in Brown Sequard syndrome per patient report, and has impaired mobility and balance resulting from injury. Patient being seen for R shoulder  injury only. Patient presented today with poor posture (rounded shoulders), decresed R shoulder ROM, and decreased strength. Patient would benefit from skilled PT services to improve R shoulder ROM and strength to return to PLOF.  -     Please refer to paper survey for additional self-reported information Yes  -WC     Rehab Potential Good  -WC     Patient/caregiver participated in establishment of treatment plan and goals Yes  -WC     Patient would benefit from skilled therapy intervention Yes  -WC        PT Plan    PT Frequency 2x/week  -     Predicted Duration of Therapy Intervention (OT Eval) 4-6 weeks  -     Planned CPT's? PT EVAL LOW COMPLEXITY: 98470;PT RE-EVAL: 64809;PT THER PROC EA 15 MIN: 68911;PT THER ACT EA 15 MIN: 36126;PT MANUAL THERAPY EA 15 MIN: 13829;PT NEUROMUSC RE-EDUCATION EA 15 MIN: 50284;PT ELECTRICAL STIM UNATTEND: ;PT THER SUPP EA 15 MIN  -     PT Plan Comments Authorization needed first. PT POC - PROM, AAROM, and AROM activities, gentle postural and scapular strengthening with progression as tolerated, postural education, ice and estim for pain  -       User Key  (r) = Recorded By, (t) = Taken By, (c) = Cosigned By    Initials Name Provider Type    IVANA Packer PT Physical Therapist                  Exercises     Row Name 03/13/18 1100             Subjective Comments    Subjective Comments See hx  -         Subjective Pain    Able to rate subjective pain? yes  -WC      Pre-Treatment Pain Level 0  -WC      Post-Treatment Pain Level 0  -        User Key  (r) = Recorded By, (t) = Taken By, (c) = Cosigned By    Initials Name Provider Type    IVANA Packer PT Physical Therapist                        Outcome Measure Options: Quick DASH  Quick DASH  Open a tight or new jar.: Unable  Do heavy household chores (e.g., wash walls, wash floors): Unable  Carry a shopping bag or briefcase: Moderate Difficulty  Wash your back: Moderate Difficulty  Use a knife to cut food:  Unable  Recreational activities in which you take some force or impact through your arm, should or hand (e.g. golf, hammering, tennis, etc.): Unable  During the past week, to what extent has your arm, shoulder, or hand problem interfered with your normal social activites with family, friends, neighbors or groups?: Extremely  During the past week, were you limited in your work or other regular daily activities as a result of your arm, shoulder or hand problem?: Unable  Arm, Shoulder, or hand pain: Moderate  Tingling (pins and needles) in your arm, shoulder, or hand: None  During the past week, how much difficulty have you had sleeping because of the pain in your arm, shoulder or hand?: Moderate Difficiculty  Number of Questions Answered: 11  Quick DASH Score: 72.73         Time Calculation:   Start Time: 1105  Stop Time: 1135  Time Calculation (min): 30 min  Total Timed Code Minutes- PT: 0 minute(s)     Therapy Charges for Today     Code Description Service Date Service Provider Modifiers Qty    04649598156 HC PT EVAL LOW COMPLEXITY 2 3/13/2018 Kelvin Packer, PT GP 1                   Kelvin Packer, PT  3/13/2018

## 2018-03-15 ENCOUNTER — HOSPITAL ENCOUNTER (OUTPATIENT)
Dept: PHYSICAL THERAPY | Facility: HOSPITAL | Age: 38
Setting detail: THERAPIES SERIES
Discharge: HOME | End: 2018-03-15
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
## 2018-03-15 DIAGNOSIS — S42.254D CLOSED NONDISPLACED FRACTURE OF GREATER TUBEROSITY OF RIGHT HUMERUS WITH ROUTINE HEALING, SUBSEQUENT ENCOUNTER: Primary | ICD-10-CM

## 2018-03-15 DIAGNOSIS — M25.511 ACUTE PAIN OF RIGHT SHOULDER: ICD-10-CM

## 2018-03-15 PROCEDURE — 97110 THERAPEUTIC EXERCISES: CPT

## 2018-03-15 NOTE — THERAPY TREATMENT NOTE
Outpatient Physical Therapy Ortho Treatment Note  [facility]     Patient Name: Jean Peñaloza  : 1980  MRN: [id number]  Today's Date: 3/15/2018      Visit Date: 03/15/2018     Subjective Improvement 0  Visits 2/2  Visits approved 8 from 3- to 2018  RTMD PRN  Recert 2018    R Humerus greater tuberosity fx on 2018    Visit Dx:    ICD-10-CM ICD-9-CM   1. Closed nondisplaced fracture of greater tuberosity of right humerus with routine healing, subsequent encounter S42.254D V54.11   2. Acute pain of right shoulder M25.511 719.41   3. Fall, initial encounter W19.XXXA E888.9       Patient Active Problem List   Diagnosis   • Anxiety   • Depression   • Laceration of thumb, right   • Dependence on wheelchair   • Brown-Sequard syndrome   • Cervical pain (neck)   • Hemiparaplegic syndrome   • Muscle spasm   • Sternum pain   • Arthralgia   • Decreased appetite   • Wound cellulitis   • Muscle pain   • Acute pain of right shoulder   • Closed fracture of head of right humerus   • Tendonosis   • Nondisplaced fracture of greater tuberosity of right humerus with routine healing   • Fall        Past Medical History:   Diagnosis Date   • Acute bronchitis    • Anxiety    • Cluster headache 2015   • Conjunctivitis 2014   • Depression    • Dorsalgia    • Malaise    • Panic disorder    • Shoulder pain     severe on right           Past Surgical History:   Procedure Laterality Date   • INJECTION OF MEDICATION  2015    KENALOG(2)   • NECK SURGERY  2016    [facility]             PT Ortho     Row Name 03/15/18 1400       Subjective Comments    Subjective Comments Patient states that he has been ex his shoulder at home.  He only has pain with certain movements  -CP    Row Name 18 1100       Subjective Comments    Subjective Comments See hx  -WC       Precautions and Contraindications    Precautions/Limitations no known precautions/limitations  -WC    Precautions R greater  tuberosity of humerus fx; hardware in cervical spine  -WC       Subjective Pain    Able to rate subjective pain? yes  -WC    Pre-Treatment Pain Level 0  -WC    Post-Treatment Pain Level 0  -WC       Posture/Observations    Posture/Observations Comments R shoulder hike  -WC       Quarter Clearing    Quarter Clearing Upper Quarter Clearing  -       Sensory Screen for Light Touch- Upper Quarter Clearing    C4 (posterior shoulder) Diminished  -WC    C5 (lateral upper arm) Diminished  -WC    C6 (tip of thumb) Diminished  -WC    C7 (tip of 3rd finger) Diminished  -WC    C8 (tip of 5th finger) Diminished  -WC    T1 (medial lower arm) Diminished  -WC       Shoulder Girdle Palpation    Subacromial Space Right:;Tender  -WC    Greater Tubercule Right:;Tender;Guarded/taut  -WC    Upper Trap Right:;Tender  -WC    Shoulder Girdle Palpation? Yes  -WC       General ROM    GENERAL ROM COMMENTS L shoulder AROM flex 170 deg,  deg, IR 53 deg, ER 80 deg; R shoulder AROM: flex 74 deg, ABD 68 deg - did not assess IR and ER; R PROM shoulder flex 114 deg  -       General Assessment (Manual Muscle Testing)    Comment, General Manual Muscle Testing (MMT) Assessment L UE MMT 5/5 globally with resting tremor due to previous injury; R UE not assessed due to fx  -WC      User Key  (r) = Recorded By, (t) = Taken By, (c) = Cosigned By    Initials Name Provider Type    CP Chrystal Christopher PTA Physical Therapy Assistant    IVANA Packer, PT Physical Therapist                            PT Assessment/Plan     Row Name 03/15/18 8217          PT Assessment    Assessment Comments Patient tolerated RX well.  Pain with supine cane fl and PROM.  Patient is a post brain injury and post cspine facture.  He fell out of a deer stand.   Per patient, he spent 2 months at [facility].  -CP        PT Plan    PT Frequency 2x/week  -CP     Predicted Duration of Therapy Intervention (OT Eval) 4-6 weeks  -CP     PT Plan Comments cont with AROM and AAROM   -CP       User Key  (r) = Recorded By, (t) = Taken By, (c) = Cosigned By    Initials Name Provider Type    CP Chrystal Christopher PTA Physical Therapy Assistant                Modalities     Row Name 03/15/18 1400             Subjective Pain    Post-Treatment Pain Level 6  -CP         Moist Heat    MH Applied Yes  -CP      Location right Shoulder  -CP      Rx Minutes 10 mins  -CP      MH S/P Rx Yes  -CP        User Key  (r) = Recorded By, (t) = Taken By, (c) = Cosigned By    Initials Name Provider Type    CP Chrystal Christopher PTA Physical Therapy Assistant                Exercises     Row Name 03/15/18 1400             Subjective Comments    Subjective Comments Patient states that he has been ex his shoulder at home.  He only has pain with certain movements  -CP         Subjective Pain    Able to rate subjective pain? yes  -CP      Pre-Treatment Pain Level 6  -CP      Post-Treatment Pain Level 6  -CP      Subjective Pain Comment with certain movement  -CP         Exercise 1    Exercise Name 1 Pro II level 1  -CP      Time 1 10  -CP      Additional Comments UE fFW  -CP         Exercise 2    Exercise Name 2 Pulleys fl and ab  -CP      Time 2 3 minutes each  -CP         Exercise 3    Exercise Name 3 supine cane fl  -CP      Sets 3 2  -CP      Reps 3 10  -CP         Exercise 4    Exercise Name 4 PROM right shoulder  -CP      Time 4 6  -CP      Additional Comments pain with AB  -CP         Exercise 5    Exercise Name 5 no money  -CP      Sets 5 2  -CP      Reps 5 10  -CP         Exercise 6    Exercise Name 6 Shoulder rolls  -CP      Sets 6 2  -CP      Reps 6 10  -CP      Time 6 fw/bw  -CP        User Key  (r) = Recorded By, (t) = Taken By, (c) = Cosigned By    Initials Name Provider Type    CP Chrystal Christopher PTA Physical Therapy Assistant                               PT OP Goals     Row Name 03/15/18 1500          PT Short Term Goals    STG Date to Achieve 04/03/18  -CP     STG 1 Patient will improve R shoulder  flexion PROM from 114 deg to 140 deg  -CP     STG 1 Progress Progressing  -CP     STG 2 Patient will report a 50% subjective improvement  -CP     STG 2 Progress Not Met  -CP     STG 3 Patient will report pain </= 3/10 with R shoulder movement  -CP     STG 3 Progress Not Met  -CP        Long Term Goals    LTG Date to Achieve 04/24/18  -CP     LTG 1 Patient will be independent with HEP  -CP     LTG 1 Progress Ongoing  -CP     LTG 2 Patient will demonstrate R shoulder AROM WFL and no pain with good biomechanics  -CP     LTG 2 Progress Not Met  -CP     LTG 3 Patient will demonstrate R UE MMT 4+/5 all planes  -CP     LTG 3 Progress Not Met  -CP     LTG 4 Patient will improve quickDASH score from 72.73% impairment to < 10% impairment  -CP     LTG 4 Progress Not Met  -CP     LTG 5 Patient will report no difficulty with lifting objects with R UE  -CP     LTG 5 Progress Not Met  -CP        Time Calculation    PT Goal Re-Cert Due Date 04/03/18  -CP       User Key  (r) = Recorded By, (t) = Taken By, (c) = Cosigned By    Initials Name Provider Type    CP Chrystal Christopher PTA Physical Therapy Assistant          Therapy Education  Education Details: pulleys, no money  Given: HEP  Program: New  How Provided: Verbal, Demonstration  Provided to: Patient  Level of Understanding: Verbalized, Demonstrated              Time Calculation:   Start Time: 1430  Stop Time: 1520  Time Calculation (min): 50 min  Total Timed Code Minutes- PT: 40 minute(s)    Therapy Charges for Today     Code Description Service Date Service Provider Modifiers Qty    47383748146 HC PT THER SUPP EA 15 MIN 3/15/2018 Chrystal Christopher PTA GP 1    05453966361 HC PT THER PROC EA 15 MIN 3/15/2018 Chrystal Christopher PTA GP 3                    Chrystal Christopher PTA  3/15/2018

## 2018-04-09 ENCOUNTER — HOSPITAL ENCOUNTER (EMERGENCY)
Facility: HOSPITAL | Age: 38
Discharge: PSYCHIATRIC OR SUBSTANCE USE FACILITY | End: 2018-04-09
Attending: FAMILY MEDICINE

## 2018-04-09 ENCOUNTER — HOSPITAL ENCOUNTER (INPATIENT)
Facility: HOSPITAL | Age: 38
LOS: 8 days | Discharge: HOME | End: 2018-04-17
Attending: PSYCHIATRY & NEUROLOGY | Admitting: PSYCHIATRY & NEUROLOGY

## 2018-04-09 VITALS
TEMPERATURE: 98 F | OXYGEN SATURATION: 98 % | SYSTOLIC BLOOD PRESSURE: 127 MMHG | HEART RATE: 95 BPM | HEIGHT: 69 IN | RESPIRATION RATE: 20 BRPM | WEIGHT: 175 LBS | BODY MASS INDEX: 25.92 KG/M2 | DIASTOLIC BLOOD PRESSURE: 63 MMHG

## 2018-04-09 DIAGNOSIS — F19.10 SUBSTANCE ABUSE (HCC): ICD-10-CM

## 2018-04-09 DIAGNOSIS — F23 ACUTE PSYCHOSIS (HCC): Primary | ICD-10-CM

## 2018-04-09 DIAGNOSIS — Z74.09 IMPAIRED FUNCTIONAL MOBILITY, BALANCE, GAIT, AND ENDURANCE: ICD-10-CM

## 2018-04-09 PROBLEM — F29 PSYCHOSIS (HCC): Status: ACTIVE | Noted: 2018-04-09

## 2018-04-09 LAB
ALBUMIN SERPL-MCNC: 4.1 G/DL (ref 3.4–4.8)
ALBUMIN/GLOB SERPL: 1.2 G/DL (ref 1.1–1.8)
ALP SERPL-CCNC: 83 U/L (ref 38–126)
ALT SERPL W P-5'-P-CCNC: 23 U/L (ref 21–72)
AMPHET+METHAMPHET UR QL: POSITIVE
ANION GAP SERPL CALCULATED.3IONS-SCNC: 15 MMOL/L (ref 5–15)
APAP SERPL-MCNC: <10 MCG/ML (ref 10–30)
AST SERPL-CCNC: 22 U/L (ref 17–59)
BARBITURATES UR QL SCN: NEGATIVE
BASOPHILS # BLD AUTO: 0.03 10*3/MM3 (ref 0–0.2)
BASOPHILS NFR BLD AUTO: 0.3 % (ref 0–2)
BENZODIAZ UR QL SCN: NEGATIVE
BILIRUB SERPL-MCNC: 0.3 MG/DL (ref 0.2–1.3)
BUN BLD-MCNC: 11 MG/DL (ref 7–21)
BUN/CREAT SERPL: 15.5 (ref 7–25)
CALCIUM SPEC-SCNC: 8.8 MG/DL (ref 8.4–10.2)
CANNABINOIDS SERPL QL: POSITIVE
CHLORIDE SERPL-SCNC: 104 MMOL/L (ref 95–110)
CO2 SERPL-SCNC: 23 MMOL/L (ref 22–31)
COCAINE UR QL: NEGATIVE
CREAT BLD-MCNC: 0.71 MG/DL (ref 0.7–1.3)
DEPRECATED RDW RBC AUTO: 44.7 FL (ref 35.1–43.9)
EOSINOPHIL # BLD AUTO: 0.23 10*3/MM3 (ref 0–0.7)
EOSINOPHIL NFR BLD AUTO: 2.5 % (ref 0–7)
ERYTHROCYTE [DISTWIDTH] IN BLOOD BY AUTOMATED COUNT: 13.7 % (ref 11.5–14.5)
ETHANOL BLD-MCNC: <10 MG/DL (ref 0–10)
ETHANOL UR QL: <0.01 %
GFR SERPL CREATININE-BSD FRML MDRD: 125 ML/MIN/1.73 (ref 70–162)
GLOBULIN UR ELPH-MCNC: 3.3 GM/DL (ref 2.3–3.5)
GLUCOSE BLD-MCNC: 83 MG/DL (ref 60–100)
HCT VFR BLD AUTO: 39 % (ref 39–49)
HGB BLD-MCNC: 13 G/DL (ref 13.7–17.3)
HOLD SPECIMEN: NORMAL
HOLD SPECIMEN: NORMAL
IMM GRANULOCYTES # BLD: 0.02 10*3/MM3 (ref 0–0.02)
IMM GRANULOCYTES NFR BLD: 0.2 % (ref 0–0.5)
LYMPHOCYTES # BLD AUTO: 1.99 10*3/MM3 (ref 0.6–4.2)
LYMPHOCYTES NFR BLD AUTO: 22 % (ref 10–50)
MCH RBC QN AUTO: 29.5 PG (ref 26.5–34)
MCHC RBC AUTO-ENTMCNC: 33.3 G/DL (ref 31.5–36.3)
MCV RBC AUTO: 88.6 FL (ref 80–98)
METHADONE UR QL SCN: NEGATIVE
MONOCYTES # BLD AUTO: 0.92 10*3/MM3 (ref 0–0.9)
MONOCYTES NFR BLD AUTO: 10.2 % (ref 0–12)
NEUTROPHILS # BLD AUTO: 5.84 10*3/MM3 (ref 2–8.6)
NEUTROPHILS NFR BLD AUTO: 64.8 % (ref 37–80)
OPIATES UR QL: NEGATIVE
OXYCODONE UR QL SCN: NEGATIVE
PLATELET # BLD AUTO: 224 10*3/MM3 (ref 150–450)
PMV BLD AUTO: 8.8 FL (ref 8–12)
POTASSIUM BLD-SCNC: 3.5 MMOL/L (ref 3.5–5.1)
PROT SERPL-MCNC: 7.4 G/DL (ref 6.3–8.6)
RBC # BLD AUTO: 4.4 10*6/MM3 (ref 4.37–5.74)
SALICYLATES SERPL-MCNC: <1 MG/DL (ref 10–20)
SODIUM BLD-SCNC: 142 MMOL/L (ref 137–145)
TSH SERPL DL<=0.05 MIU/L-ACNC: 1.4 MIU/ML (ref 0.46–4.68)
WBC NRBC COR # BLD: 9.03 10*3/MM3 (ref 3.2–9.8)
WHOLE BLOOD HOLD SPECIMEN: NORMAL
WHOLE BLOOD HOLD SPECIMEN: NORMAL

## 2018-04-09 PROCEDURE — 80307 DRUG TEST PRSMV CHEM ANLYZR: CPT | Performed by: FAMILY MEDICINE

## 2018-04-09 PROCEDURE — 93005 ELECTROCARDIOGRAM TRACING: CPT | Performed by: PSYCHIATRY & NEUROLOGY

## 2018-04-09 PROCEDURE — 80050 GENERAL HEALTH PANEL: CPT | Performed by: FAMILY MEDICINE

## 2018-04-09 PROCEDURE — 93010 ELECTROCARDIOGRAM REPORT: CPT | Performed by: INTERNAL MEDICINE

## 2018-04-09 PROCEDURE — 81003 URINALYSIS AUTO W/O SCOPE: CPT | Performed by: PSYCHIATRY & NEUROLOGY

## 2018-04-09 RX ORDER — LOPERAMIDE HYDROCHLORIDE 2 MG/1
2 CAPSULE ORAL 4 TIMES DAILY PRN
Status: DISCONTINUED | OUTPATIENT
Start: 2018-04-09 | End: 2018-04-13

## 2018-04-09 RX ORDER — GABAPENTIN 400 MG/1
800 CAPSULE ORAL ONCE
Status: COMPLETED | OUTPATIENT
Start: 2018-04-09 | End: 2018-04-09

## 2018-04-09 RX ORDER — ACETAMINOPHEN 325 MG/1
650 TABLET ORAL EVERY 4 HOURS PRN
Status: DISCONTINUED | OUTPATIENT
Start: 2018-04-09 | End: 2018-04-17 | Stop reason: HOSPADM

## 2018-04-09 RX ORDER — HYDROXYZINE PAMOATE 50 MG/1
50 CAPSULE ORAL EVERY 6 HOURS PRN
Status: DISCONTINUED | OUTPATIENT
Start: 2018-04-09 | End: 2018-04-12

## 2018-04-09 RX ORDER — ZIPRASIDONE MESYLATE 20 MG/ML
20 INJECTION, POWDER, LYOPHILIZED, FOR SOLUTION INTRAMUSCULAR DAILY PRN
Status: DISCONTINUED | OUTPATIENT
Start: 2018-04-09 | End: 2018-04-10

## 2018-04-09 RX ORDER — SODIUM CHLORIDE 0.9 % (FLUSH) 0.9 %
10 SYRINGE (ML) INJECTION AS NEEDED
Status: DISCONTINUED | OUTPATIENT
Start: 2018-04-09 | End: 2018-04-09 | Stop reason: HOSPADM

## 2018-04-09 RX ORDER — OLANZAPINE 5 MG/1
10 TABLET, ORALLY DISINTEGRATING ORAL NIGHTLY
Status: COMPLETED | OUTPATIENT
Start: 2018-04-09 | End: 2018-04-09

## 2018-04-09 RX ORDER — TRAZODONE HYDROCHLORIDE 50 MG/1
50 TABLET ORAL NIGHTLY PRN
Status: DISCONTINUED | OUTPATIENT
Start: 2018-04-09 | End: 2018-04-12

## 2018-04-09 RX ORDER — OLANZAPINE 5 MG/1
10 TABLET, ORALLY DISINTEGRATING ORAL 2 TIMES DAILY PRN
Status: DISCONTINUED | OUTPATIENT
Start: 2018-04-09 | End: 2018-04-10

## 2018-04-09 RX ADMIN — OLANZAPINE 10 MG: 5 TABLET, ORALLY DISINTEGRATING ORAL at 22:47

## 2018-04-09 RX ADMIN — ACETAMINOPHEN 650 MG: 325 TABLET ORAL at 20:26

## 2018-04-09 RX ADMIN — HYDROXYZINE PAMOATE 50 MG: 50 CAPSULE ORAL at 20:24

## 2018-04-09 RX ADMIN — OLANZAPINE 10 MG: 5 TABLET, ORALLY DISINTEGRATING ORAL at 20:25

## 2018-04-09 RX ADMIN — GABAPENTIN 800 MG: 400 CAPSULE ORAL at 22:48

## 2018-04-09 RX ADMIN — HYDROXYZINE PAMOATE 50 MG: 50 CAPSULE ORAL at 15:32

## 2018-04-09 RX ADMIN — TRAZODONE HYDROCHLORIDE 50 MG: 50 TABLET ORAL at 20:25

## 2018-04-09 NOTE — PLAN OF CARE
Problem: Patient Care Overview  Goal: Plan of Care Review  Outcome: Ongoing (interventions implemented as appropriate)   04/09/18 7444   Coping/Psychosocial   Plan of Care Reviewed With patient   Plan of Care Review   Progress no change     Goal: Individualization and Mutuality  Outcome: Ongoing (interventions implemented as appropriate)    Goal: Discharge Needs Assessment  Outcome: Ongoing (interventions implemented as appropriate)      Problem: Overarching Goals (Adult)  Goal: Adheres to Safety Considerations for Self and Others  Outcome: Ongoing (interventions implemented as appropriate)    Goal: Optimized Coping Skills in Response to Life Stressors  Outcome: Ongoing (interventions implemented as appropriate)    Goal: Develops/Participates in Therapeutic Swisher to Support Successful Transition  Outcome: Ongoing (interventions implemented as appropriate)

## 2018-04-09 NOTE — NURSING NOTE
"Patient brought to U per security & ED staff member via w/c. Patient reviewed & signed voluntary consent form & philosophy & practice for restraint/seclusion use. Patient calm & cooperative with intermittent eye contact. Contraband & body audit completed, nipple ring noted to left breast & could not remove. Bandage to right upper inner arm noted, open area that has white matter in wound. Patient  Stated \" I dont know how it got there, the doctor told me to put silver clear ointment in it\", \" It has been there a month\".  Unit rules/policies reviewed with patient verbalizing understanding. Questions answered. Oriented to room & unit. Patient brought to ED per police d/t patient hallucinating and had a gun in his possession. Patient does not know who called the police. Patient stated \" I woke up and I guess I was still dreaming & thought there were snakes on the floor around his bed. I used my cell phone flashlight to look on the floor and saw that there were no snakes but for some reason I grabbed my gun on the way to the bathroom\", \" I guess maybe my son went to the bus stop and somebody probably saw that he looked worried & asked him what was wrong and he told them about me\". Patient verbalizes need for w/c d/t injury with numbness and pain in lower limbs from falling out of a deer stand 2016 falling 10 feet and reveals he broke sternum, C5,6 & 7, T 3 & 4. Has ongoing treatment with [facility] per Dr. Clarence Dooley [phone] and had an appointment at 2:00 today. Patient stated \" I stopped taking welbutrin a month ago because all I was doing is crying and I felt better after I stopped it, I am not as emotional\", \" they started me on it after my accident\". Patient included his wife's name for visitation and to be able to call him & call nurses to inquire about him & stated \" she is  me I guess\".  "

## 2018-04-09 NOTE — SIGNIFICANT NOTE
"   04/09/18 1511   Individual Counseling   Length of Session 30   Topic Initial Assessment   Patient Response CSW met with pt 1:1 and completed psychosocial assessment and BPRS. Pt presented in his room, laying in bed in hospital scrubs, with a noticable grimace on his face. Pt c/o pain in his leds. Pt attempts to stand up, has unsteady gait. Pt mentions that he has significant pain due to an accident in 2016 in which he fell out of a deer stand and injured his brain, sternum, and back. Pt appears to have a tense and anxious mood, affect is quite labile. Pt stated that he was brought to the ER by police \"because they wanted me to get checked out.\" Per pt, \"I woke up this morning and thought I was in a snake pit in an [employer] movie. The past 2 mornings I couldnt tell the difference between reality and dream.\" Pts speech was disorganized and quite tangential. Pt later stated \"I had a dream saturday night about the [employer] excommunicating me. I went to [employer] anyway yesterday and I think this is all the devil.\" PT denies that he has ever had any issues with psychosis in the past; however, he appears quite paranoid and delusional currently. Pt denies any past psych hospitalizations. Pt reports that he \"has never been the same\", however, since accident in 2016. Pt reports that he spent significant time in [facility] rehab for his physical injuries, but has never received psych treatment. Pt reportedly had weapons on him, as pt stated \"I thought I had to protect myself.\" Pt denies hx of violence or aggression; however, his reliability is poor at this time. Pt does report that he has hx of meth use. Pt reports that he has not used since Feb 20th. However, his UDS was positive for meth. When questioned, pt stated \"I havent used. The only thing I can think of is I have these knots in my arm and sometimes when I rub them they pop and meth comes out and I get high.\" Pt also reports that he uses THC daily. CSW voiced concern " "about substance abuse and educated on risks, stating that risks often include psychosis. Pt minimized substance abuse and clearly lacks insight at this time. Pt denies SI. HI, states his main goal for tx is \"to figure out whats going on with my brain.\" Pt reports that he has only had psychosis for past two days and \"only when I wake up.\" Pt denies any active AVH, but does seem quite disorganized. Team will meet and develop tx plan. CSW to continue to follow up and engage pt in tx.      "

## 2018-04-09 NOTE — NURSING NOTE
Dr Mcqueen ROS         General  NONE    Eyes   None     ENT/Mouth   None    Cardio   None    Resp   None    GI    Appetite        None    MS    Stiffness, Muscle or Joint weakness, Back pain and Difficulty walking    Skin/Hair/Nails   Patient has open area to right upper inner arm that has depth with white matter at bottom of wound bed. Yellow drainage noted. No odor noted. No warmth alteration.     Neuro   Numbness or tingling

## 2018-04-09 NOTE — ED PROVIDER NOTES
"Subjective   Pt states that he has an appt at 2 pm with Dr David(?), psychiatrist, in [place] for hie \"6 month re-evaluation.\" Pt claims that he is having a hard time differentiating between sleep and awake. He claims that he cannot walk, but is ambulatory. He believes that he was in a snake pit this morning. Pt stopped taking Wellbutrin and effexor one month ago.         Mental Health Problem   Presenting symptoms: aggressive behavior, bizarre behavior and hallucinations    Presenting symptoms: no agitation, no depression, no suicidal thoughts, no suicidal threats and no suicide attempt    Degree of incapacity (severity):  Moderate  Onset quality:  Gradual  Duration:  2 days  Timing:  Intermittent  Progression:  Waxing and waning  Chronicity:  Recurrent  Context: noncompliance and recent medication change    Context: not alcohol use and not drug abuse    Treatment compliance:  Untreated  Time since last psychoactive medication taken:  1 month  Worsened by:  Lack of sleep  Ineffective treatments:  None tried  Associated symptoms: poor judgment    Associated symptoms: no abdominal pain, no appetite change, no chest pain, no fatigue, no headaches, no hypersomnia, no hyperventilation, no insomnia and no irritability    Risk factors: hx of mental illness    Risk factors: no hx of suicide attempts        Review of Systems   Constitutional: Negative for appetite change, chills, diaphoresis, fatigue, fever and irritability.   HENT: Negative for congestion, ear discharge, ear pain, nosebleeds, rhinorrhea, sinus pressure, sore throat and trouble swallowing.    Eyes: Negative for discharge and redness.   Respiratory: Negative for apnea, cough, chest tightness, shortness of breath and wheezing.    Cardiovascular: Negative for chest pain.   Gastrointestinal: Negative for abdominal pain, diarrhea, nausea and vomiting.   Endocrine: Negative for polyuria.   Genitourinary: Negative for dysuria, frequency and urgency. "   Musculoskeletal: Negative for myalgias and neck pain.   Skin: Negative for color change and rash.   Allergic/Immunologic: Negative for immunocompromised state.   Neurological: Negative for dizziness, seizures, syncope, weakness, light-headedness and headaches.   Hematological: Negative for adenopathy. Does not bruise/bleed easily.   Psychiatric/Behavioral: Positive for hallucinations. Negative for agitation, behavioral problems, confusion and suicidal ideas. The patient does not have insomnia.    All other systems reviewed and are negative.      Past Medical History:   Diagnosis Date   • Acute bronchitis    • Anxiety    • Cluster headache 12/02/2015   • Conjunctivitis 08/26/2014   • Depression    • Dorsalgia    • Malaise    • Panic disorder    • Shoulder pain     severe on right          No Known Allergies    Past Surgical History:   Procedure Laterality Date   • INJECTION OF MEDICATION  12/02/2015    KENALOG(2)   • NECK SURGERY  12/18/2016    [facility]       Family History   Problem Relation Age of Onset   • Cancer Maternal Grandfather    • Diabetes Maternal Grandfather        Social History     Social History   • Marital status:      Social History Main Topics   • Smoking status: Current Every Day Smoker     Packs/day: 0.50     Types: Cigarettes     Last attempt to quit: 8/14/2014   • Smokeless tobacco: Never Used      Comment: Smoking cessation information given   • Alcohol use No   • Drug use:      Types: Methamphetamines      Comment: Feb 20th   • Sexual activity: Defer     Other Topics Concern   • Not on file     Social History Narrative    History gathered from patient whose reliability is in question due his psychotic and delirious presentation.        Substance Abuse: Alcohol: he appears to be drinking regularly and likely heavily,  Cannabis: UDS is positive for meth., Methamphetamine: he reports last use in February but UDS is positive, he claims he has knots of meth in his muscle that will  spontaneously release meth, Opiate: he denies use, Cocaine: possibly past use, UDS is negative Synthetic: unkown and IV drug use: meth was used IV        Marriages: 3    Current Relationships:  but     Children: 5        Education: high school diploma    Occupation: unclear, he talked about past jobs    Living Situation: he initially said he lives with his parents then he reported that he lives in a house with just his 16yr old son           Objective   Physical Exam   Constitutional: He is oriented to person, place, and time. He appears well-developed and well-nourished.   HENT:   Head: Normocephalic and atraumatic.   Nose: Nose normal.   Mouth/Throat: Oropharynx is clear and moist.   Eyes: Conjunctivae and EOM are normal. Pupils are equal, round, and reactive to light. Right eye exhibits no discharge. Left eye exhibits no discharge. No scleral icterus.   Neck: Normal range of motion. Neck supple. No tracheal deviation present.   Cardiovascular: Normal rate, regular rhythm and normal heart sounds.    No murmur heard.  Pulmonary/Chest: Effort normal and breath sounds normal. No stridor. No respiratory distress. He has no wheezes. He has no rales.   Abdominal: Soft. Bowel sounds are normal. He exhibits no distension and no mass. There is no tenderness. There is no rebound and no guarding.   Musculoskeletal: He exhibits no edema.   Neurological: He is alert and oriented to person, place, and time. Coordination normal.   Skin: Skin is warm and dry. No rash noted. No erythema.   Psychiatric: He has a normal mood and affect. His behavior is normal. Thought content normal.   Nursing note and vitals reviewed.      Procedures         ED Course  ED Course          Labs Reviewed   ACETAMINOPHEN LEVEL - Abnormal; Notable for the following:        Result Value    Acetaminophen <10.0 (*)     All other components within normal limits   SALICYLATE LEVEL - Abnormal; Notable for the following:     Salicylate <1.0 (*)      All other components within normal limits   URINE DRUG SCREEN - Abnormal; Notable for the following:     Amphetamine Screen, Urine Positive (*)     THC, Screen, Urine Positive (*)     All other components within normal limits    Narrative:     Negative Thresholds For Drugs Screened in Urine:     Amphetamines          500 ng/ml  Barbiturates          200 ng/ml  Benzodiazepines       200 ng/ml  Cocaine               150 ng/ml  Methadone             300 ng/mL  Opiates               300 ng/mL  Oxycodone             100 ng/mL  THC                   20 ng/mL    The normal value for all drugs tested is negative. This report includes final unconfirmed screening results.  A positive result by this assay can be, at your request, sent to the Reference Lab for confirmation by gas chromatography. Unconfirmed results must not be used for non-medical purposes, such as employment or legal testing. Clinical consideration should be applied to any drug of abuse test result, particularly when unconfirmed results are used.   CBC WITH AUTO DIFFERENTIAL - Abnormal; Notable for the following:     Hemoglobin 13.0 (*)     RDW-SD 44.7 (*)     Monocytes, Absolute 0.92 (*)     All other components within normal limits   COMPREHENSIVE METABOLIC PANEL - Normal   TSH - Normal   RAINBOW DRAW    Narrative:     The following orders were created for panel order [place] Draw.  Procedure                               Abnormality         Status                     ---------                               -----------         ------                     Light Blue Top[[id number]]                                   Final result               Green Top (Gel)[[id number]]                                  Final result               Lavender Top[[id number]]                                     Final result               Gold Top - SST[[id number]]                                   Final result                 Please view results for these tests on the individual  orders.   ETHANOL   CBC AND DIFFERENTIAL    Narrative:     The following orders were created for panel order CBC & Differential.  Procedure                               Abnormality         Status                     ---------                               -----------         ------                     CBC Auto Differential[[id number]]        Abnormal            Final result                 Please view results for these tests on the individual orders.   LIGHT BLUE TOP   GREEN TOP   LAVENDER TOP   GOLD TOP - SST       No orders to display                 MDM    Final diagnoses:   Acute psychosis   Substance abuse            Fredis Redd MD  04/14/18 1549       Fredis Redd MD  04/14/18 1555

## 2018-04-09 NOTE — NURSING NOTE
"Patients peer came to nurses station concerned about patient in bed jerking and talking to himself. Entered patients room and noted patient with eyes closed and talking out loud, no jerking noted. Knocked on door to alert patient of my presence and patient continued to talk to himself. Signee woke patient and asked him who he was talking to and patient stated \" I wasn't talking\". Patient escorted to w/c & wheeled to dining  Area for lunch.    "

## 2018-04-10 PROBLEM — F10.10 ALCOHOL ABUSE: Status: ACTIVE | Noted: 2018-04-10

## 2018-04-10 PROBLEM — F15.20 AMPHETAMINE USE DISORDER, SEVERE (HCC): Status: ACTIVE | Noted: 2018-04-10

## 2018-04-10 PROBLEM — F10.231 ALCOHOL DEPENDENCE WITH WITHDRAWAL DELIRIUM (HCC): Status: ACTIVE | Noted: 2018-04-10

## 2018-04-10 PROBLEM — F15.951 AMPHETAMINE-INDUCED PSYCHOTIC DISORDER WITH HALLUCINATIONS (HCC): Status: ACTIVE | Noted: 2018-04-09

## 2018-04-10 LAB
BILIRUB UR QL STRIP: NEGATIVE
CLARITY UR: CLEAR
COLOR UR: YELLOW
GLUCOSE UR STRIP-MCNC: NEGATIVE MG/DL
HGB UR QL STRIP.AUTO: NEGATIVE
KETONES UR QL STRIP: NEGATIVE
LEUKOCYTE ESTERASE UR QL STRIP.AUTO: NEGATIVE
NITRITE UR QL STRIP: NEGATIVE
PH UR STRIP.AUTO: 5.5 [PH] (ref 5–9)
PROT UR QL STRIP: NEGATIVE
SP GR UR STRIP: 1.01 (ref 1–1.03)
UROBILINOGEN UR QL STRIP: NORMAL

## 2018-04-10 PROCEDURE — 99222 1ST HOSP IP/OBS MODERATE 55: CPT | Performed by: FAMILY MEDICINE

## 2018-04-10 PROCEDURE — 90791 PSYCH DIAGNOSTIC EVALUATION: CPT | Performed by: PSYCHIATRY & NEUROLOGY

## 2018-04-10 PROCEDURE — 25010000002 ZIPRASIDONE MESYLATE PER 10 MG: Performed by: PSYCHIATRY & NEUROLOGY

## 2018-04-10 PROCEDURE — 25010000002 LORAZEPAM PER 2 MG: Performed by: PSYCHIATRY & NEUROLOGY

## 2018-04-10 RX ORDER — RISPERIDONE 1 MG/1
1 TABLET ORAL 3 TIMES DAILY
Status: DISCONTINUED | OUTPATIENT
Start: 2018-04-10 | End: 2018-04-14

## 2018-04-10 RX ORDER — LORAZEPAM 2 MG/ML
2 INJECTION INTRAMUSCULAR ONCE
Status: COMPLETED | OUTPATIENT
Start: 2018-04-10 | End: 2018-04-10

## 2018-04-10 RX ORDER — LORAZEPAM 2 MG/1
2 TABLET ORAL
Status: DISCONTINUED | OUTPATIENT
Start: 2018-04-10 | End: 2018-04-13

## 2018-04-10 RX ORDER — LORAZEPAM 2 MG/ML
1 INJECTION INTRAMUSCULAR
Status: DISCONTINUED | OUTPATIENT
Start: 2018-04-10 | End: 2018-04-13

## 2018-04-10 RX ORDER — LORAZEPAM 2 MG/ML
2 INJECTION INTRAMUSCULAR
Status: DISCONTINUED | OUTPATIENT
Start: 2018-04-10 | End: 2018-04-13

## 2018-04-10 RX ORDER — LORAZEPAM 1 MG/1
1 TABLET ORAL
Status: DISCONTINUED | OUTPATIENT
Start: 2018-04-10 | End: 2018-04-13

## 2018-04-10 RX ORDER — ZIPRASIDONE MESYLATE 20 MG/ML
20 INJECTION, POWDER, LYOPHILIZED, FOR SOLUTION INTRAMUSCULAR DAILY PRN
Status: DISCONTINUED | OUTPATIENT
Start: 2018-04-10 | End: 2018-04-16

## 2018-04-10 RX ORDER — LORAZEPAM 1 MG/1
1 TABLET ORAL EVERY 8 HOURS
Status: DISCONTINUED | OUTPATIENT
Start: 2018-04-10 | End: 2018-04-12

## 2018-04-10 RX ADMIN — LORAZEPAM 1 MG: 1 TABLET ORAL at 15:03

## 2018-04-10 RX ADMIN — ACETAMINOPHEN 650 MG: 325 TABLET ORAL at 12:09

## 2018-04-10 RX ADMIN — ZIPRASIDONE MESYLATE 20 MG: 20 INJECTION, POWDER, LYOPHILIZED, FOR SOLUTION INTRAMUSCULAR at 00:49

## 2018-04-10 RX ADMIN — RISPERIDONE 1 MG: 1 TABLET ORAL at 12:03

## 2018-04-10 RX ADMIN — MAGNESIUM HYDROXIDE 10 ML: 2400 SUSPENSION ORAL at 23:31

## 2018-04-10 RX ADMIN — ZIPRASIDONE MESYLATE 20 MG: 20 INJECTION, POWDER, LYOPHILIZED, FOR SOLUTION INTRAMUSCULAR at 22:27

## 2018-04-10 RX ADMIN — LORAZEPAM 2 MG: 2 INJECTION INTRAMUSCULAR; INTRAVENOUS at 00:50

## 2018-04-10 RX ADMIN — TRAZODONE HYDROCHLORIDE 50 MG: 50 TABLET ORAL at 20:32

## 2018-04-10 RX ADMIN — RISPERIDONE 1 MG: 1 TABLET ORAL at 20:31

## 2018-04-10 RX ADMIN — HYDROXYZINE PAMOATE 50 MG: 50 CAPSULE ORAL at 20:32

## 2018-04-10 RX ADMIN — RISPERIDONE 1 MG: 1 TABLET ORAL at 15:03

## 2018-04-10 RX ADMIN — LORAZEPAM 1 MG: 1 TABLET ORAL at 20:31

## 2018-04-10 RX ADMIN — LORAZEPAM 1 MG: 1 TABLET ORAL at 23:33

## 2018-04-10 RX ADMIN — LORAZEPAM 1 MG: 1 TABLET ORAL at 12:12

## 2018-04-10 NOTE — PLAN OF CARE
Problem: Patient Care Overview  Goal: Discharge Needs Assessment  Outcome: Ongoing (interventions implemented as appropriate)    Goal: Interprofessional Rounds/Family Conf  Outcome: Ongoing (interventions implemented as appropriate)      Problem: Overarching Goals (Adult)  Goal: Adheres to Safety Considerations for Self and Others  Outcome: Ongoing (interventions implemented as appropriate)    Goal: Optimized Coping Skills in Response to Life Stressors  Outcome: Ongoing (interventions implemented as appropriate)    Goal: Develops/Participates in Therapeutic [place] to Support Successful Transition  Outcome: Ongoing (interventions implemented as appropriate)      Problem: Psychomotor Movement Impairment (Psychotic Signs/Symptoms) (Adult)  Goal: Improved Psychomotor Symptoms (Psychotic Signs/Symptoms)  Outcome: Ongoing (interventions implemented as appropriate)      Problem: Fall Risk (Adult)  Goal: Identify Related Risk Factors and Signs and Symptoms  Outcome: Ongoing (interventions implemented as appropriate)    Goal: Absence of Fall  Outcome: Ongoing (interventions implemented as appropriate)
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
## 2018-04-10 NOTE — NURSING NOTE
"Patient gradually becoming more agitated and confused. Patient experiencing auditory and visual hallucinations. Patient can be heard at nurses station yelling and arguing with \"the people hiding in the shadows in the corner of my room. Somebody's got their child with them.\" Patient will not stay in bed. MD notified, decision to move pt to the geriatric side.  "

## 2018-04-10 NOTE — NURSING NOTE
"Pt has been cooperative all this shift.  Had one episode of jumping out of his bed (from a nap) and darting to the doors that go to adult side.  Was trying to get through saying something about a \"custody\" issue.  Was able to orient patient and he said \"oh, I must've been dreaming\".    Assisted back to his room without further incident.  Besides that time, pt hasn't had any outbursts.  Has gone to some groups and been in common area for much of the day.  Pleasant with intermittent eye contact noted.  Will continue to monitor.  "

## 2018-04-10 NOTE — NURSING NOTE
RIZWANWA 8 - prn Ativan 1 mg PO given at this time; pt was sitting in the day room, eating lunch.  Noticeably shaky and appears agitated.  Will continue to monitor.

## 2018-04-10 NOTE — PLAN OF CARE
Problem: Overarching Goals (Adult)  Goal: Adheres to Safety Considerations for Self and Others  Outcome: Ongoing (interventions implemented as appropriate)    Goal: Optimized Coping Skills in Response to Life Stressors  Outcome: Ongoing (interventions implemented as appropriate)    Goal: Develops/Participates in Therapeutic [place] to Support Successful Transition  Outcome: Ongoing (interventions implemented as appropriate)      Problem: Psychomotor Movement Impairment (Psychotic Signs/Symptoms) (Adult)  Goal: Improved Psychomotor Symptoms (Psychotic Signs/Symptoms)  Outcome: Ongoing (interventions implemented as appropriate)      Problem: Fall Risk (Adult)  Goal: Identify Related Risk Factors and Signs and Symptoms  Outcome: Ongoing (interventions implemented as appropriate)    Goal: Absence of Fall  Outcome: Ongoing (interventions implemented as appropriate)

## 2018-04-10 NOTE — NURSING NOTE
Pt is awake now and ambulated to the bathroom with standby assist.  Is out at a table having a snack, as he slept through breakfast.  Is very shaky.  Pleasant with intermittent eye contact noted.  Will continue to monitor.

## 2018-04-10 NOTE — NURSING NOTE
Pt is resting in bed at this time.  Will do dressing change to RUE when he is awake.  No needs have been verbalized.

## 2018-04-10 NOTE — H&P
4/10/2018    Source of History:  chart review and the patient    Chief Complaint: psychosis    History of Present Illness:    Patient is a 37 y.o. male who presents with psychosis. Onset of symptoms was an unknown time ago.  Symptoms have been present on a intemittent basis but currently he appears to be constantly hallucinating. Symptoms are associated with confusion and disorientation.  Symptoms are aggravated by problems with substance abuse.   Patient's symptoms occur in the context of fall back injury possibly in 2016 and current substance use.    Patient was laying in the bed and was reaching for things and speaking to things that were not there.  He apparently was very agitated and psychotic last night.  He was given 10mg of zyprexa at HS and received another prn 10mg dose a couple of hours later.  He continued to act up and was given geodon 20mg/ativan 2mg IM at 1 am.  He finally fell asleep around 2 am.    He was able to provide some lucid answers but others were illogical.  He despite being informed several times could not report his location correctly.  He was able to give clear answers on past events.  He is fixated on being at [facility] and seems to think things that are currently happen are related to that facility.    He has been using meth but denies any use since Feb but his UDS is positive.  He claims he has knots of meth in his muscle that will spontaneously release meth.  He may have been using ETOH heavily as well.  His home med list includes klonopin 1mg qid prn.    Psychiatric Review Of Systems:  Pertinent items are noted in HPI.    History  Past psychiatric history:    Psychiatric Hospitalizations: Patient has had no prior hospitalizations.    Suicide Attempts: Patient has had no prior suicide attempts.  He did consider after he fell and had back injury but he did not attempt.    Prior Treatment and Medications Tried: his home meds list klonopin 1mg qid prn, wellbutrin xl 150mg daily,  neurontin 1000mg tid, trazodone 50mg qhs, effexor-xr 150mg bid    History of violence or legal issues: 19yrs old for DUI and drug possession, 4yrs ago for DUI possilby for ETOH or something else; another one for a bad check    Social History:    Social History     Social History   • Marital status:      Spouse name: N/A   • Number of children: N/A   • Years of education: N/A     Occupational History   • Not on file.     Social History Main Topics   • Smoking status: Current Every Day Smoker     Packs/day: 0.50     Types: Cigarettes     Last attempt to quit: 8/14/2014   • Smokeless tobacco: Never Used      Comment: Smoking cessation information given   • Alcohol use No   • Drug use:      Types: Methamphetamines      Comment: Feb 20th   • Sexual activity: Defer     Other Topics Concern   • Not on file     Social History Narrative    History gathered from patient whose reliability is in question due his psychotic and delirious presentation.        Substance Abuse: Alcohol: he appears to be drinking regularly and likely heavily,  Cannabis: UDS is positive for meth., Methamphetamine: he reports last use in February but UDS is positive, he claims he has knots of meth in his muscle that will spontaneously release meth, Opiate: he denies use, Cocaine: possibly past use, UDS is negative Synthetic: unkown and IV drug use: meth was used IV        Marriages: 3    Current Relationships:  but     Children: 5        Education: high school diploma    Occupation: unclear, he talked about past jobs    Living Situation: he initially said he lives with his parents then he reported that he lives in a house with just his 16yr old son           Family History:    Family History   Problem Relation Age of Onset   • Cancer Maternal Grandfather    • Diabetes Maternal Grandfather          Past Medical and Surgical History:    Past Medical History:   Diagnosis Date   • Acute bronchitis    • Anxiety    • Cluster headache  12/02/2015   • Conjunctivitis 08/26/2014   • Depression    • Dorsalgia    • Malaise    • Panic disorder    • Shoulder pain     severe on right        Past Surgical History:   Procedure Laterality Date   • INJECTION OF MEDICATION  12/02/2015    KENALOG(2)   • NECK SURGERY  12/18/2016    [facility]     Allergies:  Review of patient's allergies indicates no known allergies.  Prescriptions Prior to Admission   Medication Sig Dispense Refill Last Dose   • acetaminophen-codeine (TYLENOL #3) 300-30 MG per tablet Take 1 tablet by mouth Every 4 (Four) Hours As Needed for Moderate Pain . 90 tablet 0 Taking   • baclofen (LIORESAL) 10 MG tablet Take 0.5 tablets by mouth 3 (Three) Times a Day. 90 tablet 5 Taking   • buPROPion SR (WELLBUTRIN SR) 150 MG 12 hr tablet Take 150 mg by mouth 2 (Two) Times a Day.   Taking   • clonazePAM (KLONOPIN) 1 MG tablet Take 1 tablet by mouth 4 (Four) Times a Day As Needed for Anxiety. 120 tablet 0 Taking   • docusate sodium (COLACE) 100 MG capsule Take 1 capsule by mouth 2 (Two) Times a Day. 60 capsule 5 Taking   • gabapentin (NEURONTIN) 400 MG capsule Take 1 capsule by mouth 3 (Three) Times a Day. 90 capsule 2 Taking   • gabapentin (NEURONTIN) 600 MG tablet Take 1 tablet by mouth 3 (Three) Times a Day. 90 tablet 2 Taking   • megestrol (MEGACE) 20 MG tablet Take 1 tablet by mouth Daily. 30 tablet 11 More than a month at Unknown time   • mupirocin (BACTROBAN) 2 % ointment Apply  topically 2 (Two) Times a Day. 30 g 5 Taking   • sulfamethoxazole-trimethoprim (BACTRIM DS) 800-160 MG per tablet Take 1 tablet by mouth 2 (Two) Times a Day. 20 tablet 0 Taking   • traZODone (DESYREL) 50 MG tablet Take 1 tablet by mouth Every Night. 30 tablet 5 Taking   • venlafaxine XR (EFFEXOR-XR) 150 MG 24 hr capsule Take 1 capsule by mouth 2 (Two) Times a Day. 60 capsule 5 Taking       Medical Review Of Systems:  Reviewed review of systems from  Dr. Mcqueen's consult note from today.    Objective     Vital Signs    Temp:   "[96.4 °F (35.8 °C)-98.2 °F (36.8 °C)] 96.4 °F (35.8 °C)  Heart Rate:  [] 88  Resp:  [18-20] 18  BP: (112-127)/(57-82) 112/57    Physical Exam:   General Appearance: alert and cooperative,  Hygiene:   poor  Gait & Station: in bed and needs assistance to ambulate and uses a wheelchair  Musculoskeletal: he appearred mildly tremulous    Mental Status Exam:   Cooperation:  Cooperative  Eye Contact:  scattered  Psychomotor Behavior:  Restless moving about the bed and reaching for things  Mood: confused  Affect:  constricted  Speech:  Normal  Thought Process:  at times clear and coherent at others illogical  Associations: mostly goal directed but at times tangential  Thought Content:     At times bizarre and delusional   Suicidal:  None reported   Homicidal:  None   Hallucinations:  Auditory and Visual   Delusion:  Odd delusional thinking  Cognitive Functioning:   Consciousness: awake and alert   Orientation:  Person, Time and he did not know the place and was confused about the situation   Attention: distractible Concentration: World Backwards: \"dlrow\"   Language:  Intact Vocabulary: Average   Short Term Memory: Deficits and 0/3   Long Term Memory: appears to be intact   Fund of Knowledge: Below Average  Reliability:  poor  Insight:  Poor  Judgement:  Poor  Impulse Control:  Impaired    Diagnostic Data:    Recent Results (from the past 72 hour(s))   Comprehensive Metabolic Panel    Collection Time: 04/09/18  8:08 AM   Result Value Ref Range    Glucose 83 60 - 100 mg/dL    BUN 11 7 - 21 mg/dL    Creatinine 0.71 0.70 - 1.30 mg/dL    Sodium 142 137 - 145 mmol/L    Potassium 3.5 3.5 - 5.1 mmol/L    Chloride 104 95 - 110 mmol/L    CO2 23.0 22.0 - 31.0 mmol/L    Calcium 8.8 8.4 - 10.2 mg/dL    Total Protein 7.4 6.3 - 8.6 g/dL    Albumin 4.10 3.40 - 4.80 g/dL    ALT (SGPT) 23 21 - 72 U/L    AST (SGOT) 22 17 - 59 U/L    Alkaline Phosphatase 83 38 - 126 U/L    Total Bilirubin 0.3 0.2 - 1.3 mg/dL    eGFR Non African Amer 125 " 70 - 162 mL/min/1.73    Globulin 3.3 2.3 - 3.5 gm/dL    A/G Ratio 1.2 1.1 - 1.8 g/dL    BUN/Creatinine Ratio 15.5 7.0 - 25.0    Anion Gap 15.0 5.0 - 15.0 mmol/L   Acetaminophen Level    Collection Time: 04/09/18  8:08 AM   Result Value Ref Range    Acetaminophen <10.0 (L) 10.0 - 30.0 mcg/mL   Ethanol    Collection Time: 04/09/18  8:08 AM   Result Value Ref Range    Ethanol <10 0 - 10 mg/dL    Ethanol % <0.010 %   Salicylate Level    Collection Time: 04/09/18  8:08 AM   Result Value Ref Range    Salicylate <1.0 (L) 10.0 - 20.0 mg/dL   TSH    Collection Time: 04/09/18  8:08 AM   Result Value Ref Range    TSH 1.400 0.460 - 4.680 mIU/mL   Light Blue Top    Collection Time: 04/09/18  8:08 AM   Result Value Ref Range    Extra Tube hold for add-on    Green Top (Gel)    Collection Time: 04/09/18  8:08 AM   Result Value Ref Range    Extra Tube Hold for add-ons.    Lavender Top    Collection Time: 04/09/18  8:08 AM   Result Value Ref Range    Extra Tube hold for add-on    Gold Top - SST    Collection Time: 04/09/18  8:08 AM   Result Value Ref Range    Extra Tube Hold for add-ons.    CBC Auto Differential    Collection Time: 04/09/18  8:08 AM   Result Value Ref Range    WBC 9.03 3.20 - 9.80 10*3/mm3    RBC 4.40 4.37 - 5.74 10*6/mm3    Hemoglobin 13.0 (L) 13.7 - 17.3 g/dL    Hematocrit 39.0 39.0 - 49.0 %    MCV 88.6 80.0 - 98.0 fL    MCH 29.5 26.5 - 34.0 pg    MCHC 33.3 31.5 - 36.3 g/dL    RDW 13.7 11.5 - 14.5 %    RDW-SD 44.7 (H) 35.1 - 43.9 fl    MPV 8.8 8.0 - 12.0 fL    Platelets 224 150 - 450 10*3/mm3    Neutrophil % 64.8 37.0 - 80.0 %    Lymphocyte % 22.0 10.0 - 50.0 %    Monocyte % 10.2 0.0 - 12.0 %    Eosinophil % 2.5 0.0 - 7.0 %    Basophil % 0.3 0.0 - 2.0 %    Immature Grans % 0.2 0.0 - 0.5 %    Neutrophils, Absolute 5.84 2.00 - 8.60 10*3/mm3    Lymphocytes, Absolute 1.99 0.60 - 4.20 10*3/mm3    Monocytes, Absolute 0.92 (H) 0.00 - 0.90 10*3/mm3    Eosinophils, Absolute 0.23 0.00 - 0.70 10*3/mm3    Basophils, Absolute  0.03 0.00 - 0.20 10*3/mm3    Immature Grans, Absolute 0.02 0.00 - 0.02 10*3/mm3   Urine Drug Screen - Urine, Clean Catch    Collection Time: 04/09/18  9:17 AM   Result Value Ref Range    Amphetamine Screen, Urine Positive (A) Negative    Barbiturates Screen, Urine Negative Negative    Benzodiazepine Screen, Urine Negative Negative    Cocaine Screen, Urine Negative Negative    Methadone Screen, Urine Negative Negative    Opiate Screen Negative Negative    Oxycodone Screen, Urine Negative Negative    THC, Screen, Urine Positive (A) Negative     No results found.      Patient Strengths: communication skills, patient is voluntary, is willing to work on problems     Patient Barriers: substance abuse    Assessment/Plan     Principal Problem:    Amphetamine-induced psychotic disorder with hallucinations  Active Problems:    Amphetamine use disorder, severe    Alcohol dependence with withdrawal delirium      Treatment Plan:    1) Will admit patient to the behavioral health unit at [facility] to ensure patient safety.  2) Patient will be provided treatment with the unit milieu, activities, therapies and psychopharmacological management.  3) Patient placed on  Q15 minute checks  and Elopement, Aggression and Fall precautions.  4) Dr. Mcqueen consulted for assistance in management of medical co-morbidities.  5) Will order following labs: CBC, CMP in am. UA now  6) Will restart patient on the following psychiatric home meds: will not restart the effexor, wellbutrin, klonopin.  7) Will make the following medication changes: Will start risperdal 1mg tid.  Will start ativan 1mg [id number].  Will start CIWA protocol.  Will continue prn geodon IM.  8) Will begin discharge planning as appropriate for patient.  9) Psychotherapy provided: none    Treatment plan and medication risks and benefits discussed with: Patient and Treatment Team      Estimated Length of Stay: 1 week  Prognosis: thuan Riggins  MD  04/10/18  10:25 AM

## 2018-04-10 NOTE — NURSING NOTE
"Patient continues to argue with staff about his auditory and visual hallucinations. He states to the nursing staff. \"Stop being childish I know you see the woman sitting right beside me.\" Patient speech has declined, as well. Most rambling is incoherent. Patient requires staff at bedside.  "

## 2018-04-10 NOTE — PLAN OF CARE
Problem: Patient Care Overview  Goal: Plan of Care Review  Outcome: Ongoing (interventions implemented as appropriate)  Encourage intake of meals and supplement.   04/10/18 3584   Coping/Psychosocial   Plan of Care Reviewed With patient   Plan of Care Review   Progress no change   OTHER   Outcome Summary initial assessment

## 2018-04-10 NOTE — CONSULTS
Adult Nutrition  Assessment    Patient Name:  Jean Peñaloza  YOB: 1980  MRN: [id number]  Admit Date:  4/9/2018    Assessment Date:  4/10/2018    Comments:  Pt reports fair appetite.  Reports wt fluctuates between 150-170 lb.  Reports some problems swallowing bread due to neck surgery he has had.  Didn't want bread to be restricted however.  Pt discussed with speech.  Willing to receive Ensure with meals.  Intake 100% - 2x, 0% - 1x.  Meds and labs reviewed.          Adult Nutrition Assessment     Row Name 04/10/18 1433       PO Evaluation    Number of Meals 3    % PO Intake 100% - 2x, 0% - 1x    Row Name 04/10/18 1432       Nutrition Prescription PO    Current PO Diet Regular    Row Name 04/10/18 1427       Physical Findings    Skin other (see comments)   right upper arm abscess       Calculation Measurements    Weight Used For Calculations 72.9 kg (160 lb 11.5 oz)       Estimated/Assessed Needs    Additional Documentation Calorie Requirements (Group);Fluid Requirements (Group);[place]-St. Jeor Equation (Group);Protein Requirements (Group)       Calorie Requirements    Estimated Calorie Requirement (kcal/day) 2137    Estimated Calorie Need Method [place]-St Jeor       KCAL/KG    14 Kcal/Kg (kcal) 1020.6    15 Kcal/Kg (kcal) 1093.5    18 Kcal/Kg (kcal) 1312.2    20 Kcal/Kg (kcal) 1458    25 Kcal/Kg (kcal) 1822.5    30 Kcal/Kg (kcal) 2187    35 Kcal/Kg (kcal) 2551.5    40 Kcal/Kg (kcal) 2916    45 Kcal/Kg (kcal) 3280.5    50 Kcal/Kg (kcal) 3645       [place]-St. Jeor Equation    RMR ([place]-St. Jeor Equation) 1644.38       Protein Requirements    Est Protein Requirement Amount (gms/kg) 1.0 gm protein    Estimated Protein Requirements (gms/day) 72.9       Fluid Requirements    Estimated Fluid Requirements (mL/day) 2187    RDA Method (mL) 2187    [place] Method (over 20 kg) 2958    Row Name 04/10/18 1426       Reason for Assessment    Reason For Assessment identified at risk by screening  criteria    Identified At Risk by Screening Criteria MST SCORE 2+       Labs/Procedures/Meds    Lab Results Reviewed reviewed          Electronically signed by:  Nataly Cruz RD  04/10/18 2:35 PM
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
## 2018-04-11 LAB
ALBUMIN SERPL-MCNC: 3.9 G/DL (ref 3.4–4.8)
ALBUMIN/GLOB SERPL: 1.2 G/DL (ref 1.1–1.8)
ALP SERPL-CCNC: 76 U/L (ref 38–126)
ALT SERPL W P-5'-P-CCNC: 18 U/L (ref 21–72)
ANION GAP SERPL CALCULATED.3IONS-SCNC: 11 MMOL/L (ref 5–15)
AST SERPL-CCNC: 26 U/L (ref 17–59)
BASOPHILS # BLD AUTO: 0.03 10*3/MM3 (ref 0–0.2)
BASOPHILS NFR BLD AUTO: 0.5 % (ref 0–2)
BILIRUB SERPL-MCNC: 0.5 MG/DL (ref 0.2–1.3)
BUN BLD-MCNC: 11 MG/DL (ref 7–21)
BUN/CREAT SERPL: 14.5 (ref 7–25)
CALCIUM SPEC-SCNC: 9.4 MG/DL (ref 8.4–10.2)
CHLORIDE SERPL-SCNC: 100 MMOL/L (ref 95–110)
CO2 SERPL-SCNC: 27 MMOL/L (ref 22–31)
CREAT BLD-MCNC: 0.76 MG/DL (ref 0.7–1.3)
DEPRECATED RDW RBC AUTO: 43.7 FL (ref 35.1–43.9)
EOSINOPHIL # BLD AUTO: 0.25 10*3/MM3 (ref 0–0.7)
EOSINOPHIL NFR BLD AUTO: 3.8 % (ref 0–7)
ERYTHROCYTE [DISTWIDTH] IN BLOOD BY AUTOMATED COUNT: 13.7 % (ref 11.5–14.5)
GFR SERPL CREATININE-BSD FRML MDRD: 115 ML/MIN/1.73
GLOBULIN UR ELPH-MCNC: 3.2 GM/DL (ref 2.3–3.5)
GLUCOSE BLD-MCNC: 86 MG/DL (ref 60–100)
HCT VFR BLD AUTO: 37.8 % (ref 39–49)
HGB BLD-MCNC: 12.6 G/DL (ref 13.7–17.3)
IMM GRANULOCYTES # BLD: 0.01 10*3/MM3 (ref 0–0.02)
IMM GRANULOCYTES NFR BLD: 0.2 % (ref 0–0.5)
LYMPHOCYTES # BLD AUTO: 2.79 10*3/MM3 (ref 0.6–4.2)
LYMPHOCYTES NFR BLD AUTO: 42.9 % (ref 10–50)
MCH RBC QN AUTO: 29.2 PG (ref 26.5–34)
MCHC RBC AUTO-ENTMCNC: 33.3 G/DL (ref 31.5–36.3)
MCV RBC AUTO: 87.5 FL (ref 80–98)
MONOCYTES # BLD AUTO: 0.48 10*3/MM3 (ref 0–0.9)
MONOCYTES NFR BLD AUTO: 7.4 % (ref 0–12)
NEUTROPHILS # BLD AUTO: 2.94 10*3/MM3 (ref 2–8.6)
NEUTROPHILS NFR BLD AUTO: 45.2 % (ref 37–80)
PLATELET # BLD AUTO: 266 10*3/MM3 (ref 150–450)
PMV BLD AUTO: 9.1 FL (ref 8–12)
POTASSIUM BLD-SCNC: 3.9 MMOL/L (ref 3.5–5.1)
PROT SERPL-MCNC: 7.1 G/DL (ref 6.3–8.6)
RBC # BLD AUTO: 4.32 10*6/MM3 (ref 4.37–5.74)
SODIUM BLD-SCNC: 138 MMOL/L (ref 137–145)
WBC NRBC COR # BLD: 6.5 10*3/MM3 (ref 3.2–9.8)

## 2018-04-11 PROCEDURE — 80053 COMPREHEN METABOLIC PANEL: CPT | Performed by: PSYCHIATRY & NEUROLOGY

## 2018-04-11 PROCEDURE — 85025 COMPLETE CBC W/AUTO DIFF WBC: CPT | Performed by: PSYCHIATRY & NEUROLOGY

## 2018-04-11 PROCEDURE — 99232 SBSQ HOSP IP/OBS MODERATE 35: CPT | Performed by: PSYCHIATRY & NEUROLOGY

## 2018-04-11 RX ORDER — BACLOFEN 10 MG/1
5 TABLET ORAL 3 TIMES DAILY
Status: DISCONTINUED | OUTPATIENT
Start: 2018-04-11 | End: 2018-04-17 | Stop reason: HOSPADM

## 2018-04-11 RX ORDER — GABAPENTIN 300 MG/1
600 CAPSULE ORAL 3 TIMES DAILY
Status: DISCONTINUED | OUTPATIENT
Start: 2018-04-11 | End: 2018-04-12

## 2018-04-11 RX ADMIN — LORAZEPAM 1 MG: 1 TABLET ORAL at 06:40

## 2018-04-11 RX ADMIN — GABAPENTIN 600 MG: 300 CAPSULE ORAL at 20:19

## 2018-04-11 RX ADMIN — Medication 5 MG: at 16:28

## 2018-04-11 RX ADMIN — HYDROXYZINE PAMOATE 50 MG: 50 CAPSULE ORAL at 20:19

## 2018-04-11 RX ADMIN — RISPERIDONE 1 MG: 1 TABLET ORAL at 20:19

## 2018-04-11 RX ADMIN — HYDROXYZINE PAMOATE 50 MG: 50 CAPSULE ORAL at 12:09

## 2018-04-11 RX ADMIN — TRAZODONE HYDROCHLORIDE 50 MG: 50 TABLET ORAL at 20:19

## 2018-04-11 RX ADMIN — GABAPENTIN 600 MG: 300 CAPSULE ORAL at 16:28

## 2018-04-11 RX ADMIN — RISPERIDONE 1 MG: 1 TABLET ORAL at 08:33

## 2018-04-11 RX ADMIN — Medication 5 MG: at 20:19

## 2018-04-11 RX ADMIN — GABAPENTIN 600 MG: 300 CAPSULE ORAL at 12:07

## 2018-04-11 RX ADMIN — LORAZEPAM 1 MG: 1 TABLET ORAL at 14:05

## 2018-04-11 RX ADMIN — RISPERIDONE 1 MG: 1 TABLET ORAL at 16:28

## 2018-04-11 RX ADMIN — Medication 5 MG: at 12:05

## 2018-04-11 NOTE — SIGNIFICANT NOTE
"   04/11/18 4997   Individual Counseling   Length of Session 20   Topic Update   Patient Response CSW approached pt in the hobbs, as he was sitting in his wheelchair responding. When approached, pt pointed to the ceiling and stated \"I'm talking to ET and getting him ready to go home.\" Pt then started licking his fingertips, stating \"I got chocolate and peanut butter on my fingers from helping ET.\" Pt asked if I could see what he was seeing, and when I stated no, pt laughed and changed the subject. CSW inquired again about pts substance abuse. Pt stated \"Feb 29th is the last time I've used.\" Pt then began discussing his meth sores and how when he touches them, they release meth and \"I just get high. I got high the other night, its crazy.\" Pt continued to respond throughout our conversation, often times trying to stand up, although he is very unsteady. CSW asked pt about his relationship with his wife. Upon admission, pt stated they were , but today he states they are not, \"she just lives up here.\" Pt then stated \"Joanne been planning to come check out this inpt rehab for a while and she knows that.\" Pt continued to be disorganized and quite delusional. CSW attempted to reorient pt and assist him in distinguishing reality from delusion. Pt was plesant, yet lacks any insight at this time. Per family, this is not pt's baseline and they feel that it is primarily substance induced. CSW to continue to follow up with pt in efforts to reorient and make progress towards tx goals.      "

## 2018-04-11 NOTE — PLAN OF CARE
Problem: Patient Care Overview  Goal: Interprofessional Rounds/Family Conf  Outcome: Ongoing (interventions implemented as appropriate)      Problem: Overarching Goals (Adult)  Goal: Adheres to Safety Considerations for Self and Others  Outcome: Ongoing (interventions implemented as appropriate)

## 2018-04-11 NOTE — PLAN OF CARE
Problem: Overarching Goals (Adult)  Goal: Optimized Coping Skills in Response to Life Stressors  Outcome: Ongoing (interventions implemented as appropriate)    Goal: Develops/Participates in Therapeutic [place] to Support Successful Transition  Outcome: Ongoing (interventions implemented as appropriate)      Problem: Psychomotor Movement Impairment (Psychotic Signs/Symptoms) (Adult)  Goal: Improved Psychomotor Symptoms (Psychotic Signs/Symptoms)  Outcome: Ongoing (interventions implemented as appropriate)      Problem: Fall Risk (Adult)  Goal: Identify Related Risk Factors and Signs and Symptoms  Outcome: Ongoing (interventions implemented as appropriate)    Goal: Absence of Fall  Outcome: Ongoing (interventions implemented as appropriate)

## 2018-04-11 NOTE — PLAN OF CARE
Problem: Patient Care Overview  Goal: Discharge Needs Assessment  Outcome: Ongoing (interventions implemented as appropriate)    Goal: Interprofessional Rounds/Family Conf  Outcome: Ongoing (interventions implemented as appropriate)      Problem: Overarching Goals (Adult)  Goal: Adheres to Safety Considerations for Self and Others  Outcome: Ongoing (interventions implemented as appropriate)    Goal: Optimized Coping Skills in Response to Life Stressors  Outcome: Ongoing (interventions implemented as appropriate)    Goal: Develops/Participates in Therapeutic [place] to Support Successful Transition  Outcome: Ongoing (interventions implemented as appropriate)      Problem: Psychomotor Movement Impairment (Psychotic Signs/Symptoms) (Adult)  Goal: Improved Psychomotor Symptoms (Psychotic Signs/Symptoms)  Outcome: Ongoing (interventions implemented as appropriate)      Problem: Fall Risk (Adult)  Goal: Identify Related Risk Factors and Signs and Symptoms  Outcome: Ongoing (interventions implemented as appropriate)    Goal: Absence of Fall  Outcome: Ongoing (interventions implemented as appropriate)
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
## 2018-04-11 NOTE — NURSING NOTE
Behavior   Anxiety: Feeling anxious  Depression: None  Pain  0  AVH   yes   S/I   no  H/I   no    Affect   calm and pleasant    Note: Patient came into treatment team and told a story of being awake/asleep having a dream of going to [employer] and when he got to [employer] everyone was waiting for him and then wanted to hug him when he left?    He continues to make delusional statements and states that he didn't sleep here last night, he went home and slept in his bed and came back this morning.    Signee spoke with his dad (Dago) phone # [phone] and was told that this is a new onset for him, that he has not had this behavior before. He said that his son also states he has a fracture in his shoulder from a month ago.  Jean has not given this information.               Intervention  Instructed in medication usage and effects  Medications administered as ordered  Encouraged to verbalize needs      Response  Verbalized understanding   Did patient take medications as ordered yes   Did patient interact with assessment?  yes     Plan  Will monitor for safety  Will monitor every 15 minutes as ordered  Will evaluate and promote the plan of care

## 2018-04-11 NOTE — PLAN OF CARE
Problem: Physiological Impairment (Excessive Substance Use) (Adult)  Goal: Improved Physiologic Symptoms (Excessive Substance Use)  Outcome: Ongoing (interventions implemented as appropriate)

## 2018-04-11 NOTE — NURSING NOTE
Patient appears to have dreams and will wake up thinking the dreams are real. Patient is agitated and hyperactive at this time. Patient forgets limitations at this time.

## 2018-04-11 NOTE — NURSING NOTE
"Behavior   Anxiety: Feeling anxious  Depression: insomnia  Pain   none present  AVH   yes   S/I   no  H/I   no  Affect   very anxious and aggitated    Patient found wheeling down the hallway quickly trying to get in all the rooms. When questioned about his actions patient states he is \"trying to fly outta here I'm supposed to be playing football for the [employer], it's the most important game in the world\". Patient was agitated and could not be redirected. Medications administered, staff in line of sight.    Intervention  Medications reviewed and administered  Assessment complete    Response  Verbalized understanding   Took medications  Interacted with assessment    Plan  Will promote and reinforce current treatment plan and encourage involvement in care plan goals.   Will provide for safe, calm, quiet environment.  Will promote open communication with staff and foster a trusting/working relationship with patient.   Will promote participation in groups and therapies and independent reflection.        "

## 2018-04-11 NOTE — NURSING NOTE
"Client was in group with peers when he started to accuse another of taking his silver cup. He states that someone is trying to get his \"triggers\" to make him act out.    As we were talking he states that \"spongebob is dancing on that wall, why are making him do that, to prove I'm crazy\"    He gives a long history that appears to be accurate of his past.  He does calm down and talk to the signee. 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  He says that he knows it is his own fault, but that didn't help.   He also continues to pull at string that is not there and when asked he states that it is because he has no feeling in his right fingers, but continues to do this \"pulling string\" all over his body.      He also tells me that he has not used any drugs since Feb. 20th and that he believes he tested positive because he has meth stored in his arm \"popping\" and sometimes they bust, he can tell when they do because he feels a warmth come up his back.     He has now calmed down and is watching TV with peers.   "

## 2018-04-12 ENCOUNTER — APPOINTMENT (OUTPATIENT)
Dept: CT IMAGING | Facility: HOSPITAL | Age: 38
End: 2018-04-12

## 2018-04-12 PROBLEM — F10.20 ALCOHOL USE DISORDER, SEVERE, DEPENDENCE (HCC): Status: ACTIVE | Noted: 2018-04-10

## 2018-04-12 PROCEDURE — 70450 CT HEAD/BRAIN W/O DYE: CPT

## 2018-04-12 PROCEDURE — G8978 MOBILITY CURRENT STATUS: HCPCS

## 2018-04-12 PROCEDURE — 99232 SBSQ HOSP IP/OBS MODERATE 35: CPT | Performed by: PSYCHIATRY & NEUROLOGY

## 2018-04-12 PROCEDURE — 97162 PT EVAL MOD COMPLEX 30 MIN: CPT

## 2018-04-12 PROCEDURE — G8979 MOBILITY GOAL STATUS: HCPCS

## 2018-04-12 PROCEDURE — 97116 GAIT TRAINING THERAPY: CPT

## 2018-04-12 RX ORDER — MULTIVITAMIN,THERAPEUTIC
1 TABLET ORAL DAILY
Status: DISCONTINUED | OUTPATIENT
Start: 2018-04-12 | End: 2018-04-17 | Stop reason: HOSPADM

## 2018-04-12 RX ORDER — GABAPENTIN 300 MG/1
300 CAPSULE ORAL 3 TIMES DAILY
Status: DISCONTINUED | OUTPATIENT
Start: 2018-04-12 | End: 2018-04-15

## 2018-04-12 RX ORDER — LANOLIN ALCOHOL/MO/W.PET/CERES
6 CREAM (GRAM) TOPICAL NIGHTLY
Status: DISCONTINUED | OUTPATIENT
Start: 2018-04-12 | End: 2018-04-17 | Stop reason: HOSPADM

## 2018-04-12 RX ORDER — THIAMINE MONONITRATE (VIT B1) 100 MG
100 TABLET ORAL DAILY
Status: DISCONTINUED | OUTPATIENT
Start: 2018-04-12 | End: 2018-04-17 | Stop reason: HOSPADM

## 2018-04-12 RX ORDER — RISPERIDONE 0.5 MG/1
0.5 TABLET ORAL 3 TIMES DAILY PRN
Status: DISCONTINUED | OUTPATIENT
Start: 2018-04-12 | End: 2018-04-16

## 2018-04-12 RX ORDER — LORAZEPAM 0.5 MG/1
0.5 TABLET ORAL EVERY 8 HOURS
Status: DISCONTINUED | OUTPATIENT
Start: 2018-04-12 | End: 2018-04-13

## 2018-04-12 RX ADMIN — LORAZEPAM 1 MG: 1 TABLET ORAL at 08:23

## 2018-04-12 RX ADMIN — Medication 5 MG: at 08:23

## 2018-04-12 RX ADMIN — Medication 5 MG: at 16:20

## 2018-04-12 RX ADMIN — Medication 100 MG: at 15:00

## 2018-04-12 RX ADMIN — RISPERIDONE 1 MG: 1 TABLET ORAL at 16:17

## 2018-04-12 RX ADMIN — RISPERIDONE 1 MG: 1 TABLET ORAL at 08:23

## 2018-04-12 RX ADMIN — RISPERIDONE 1 MG: 1 TABLET ORAL at 22:18

## 2018-04-12 RX ADMIN — LORAZEPAM 0.5 MG: 0.5 TABLET ORAL at 22:17

## 2018-04-12 RX ADMIN — GABAPENTIN 600 MG: 300 CAPSULE ORAL at 08:23

## 2018-04-12 RX ADMIN — GABAPENTIN 300 MG: 300 CAPSULE ORAL at 21:11

## 2018-04-12 RX ADMIN — MELATONIN TAB 3 MG 6 MG: 3 TAB at 21:11

## 2018-04-12 RX ADMIN — LORAZEPAM 0.5 MG: 0.5 TABLET ORAL at 16:15

## 2018-04-12 RX ADMIN — GABAPENTIN 300 MG: 300 CAPSULE ORAL at 16:21

## 2018-04-12 RX ADMIN — Medication 5 MG: at 21:11

## 2018-04-12 RX ADMIN — THERA TABS 1 TABLET: TAB at 15:00

## 2018-04-12 NOTE — NURSING NOTE
"Refused to get up for lunch. Started yelling at staff with eyes slightly open. \"Get out of here you fucking bitch. \" pt became aggressive with staff when attempted to assist pt to sitting position.pt continues to yell and swear at staff then returned to sleep. Dr coelho notified.   "

## 2018-04-12 NOTE — NURSING NOTE
Behavior   Anxiety: Feeling anxious  Depression: disturbed sleep  Pain   none present  AVH   yes   S/I   no  H/I   no  Affect   Anxious    Patient spent evening in dayroom. Patient engaging in active visual hallucinations were observed. Patient was talking to his dog and working on a heater in the floor. Patient never attempted to get out of wheelchair. Snack provided. Staff helped patient to bed around 2100 and has been asleep ever since. Needs met, will continue to monitor.    Intervention  Medications reviewed and administered  Assessment complete    Response  Verbalized understanding   Took medications  Interacted with assessment    Plan  Will promote and reinforce current treatment plan and encourage involvement in care plan goals.   Will provide for safe, calm, quiet environment.  Will promote open communication with staff and foster a trusting/working relationship with patient.   Will promote participation in groups and therapies and independent reflection.

## 2018-04-12 NOTE — PLAN OF CARE
Problem: Overarching Goals (Adult)  Goal: Adheres to Safety Considerations for Self and Others  Outcome: Ongoing (interventions implemented as appropriate)    Goal: Optimized Coping Skills in Response to Life Stressors  Outcome: Ongoing (interventions implemented as appropriate)    Goal: Develops/Participates in Therapeutic [place] to Support Successful Transition  Outcome: Ongoing (interventions implemented as appropriate)      Problem: Psychomotor Movement Impairment (Psychotic Signs/Symptoms) (Adult)  Goal: Improved Psychomotor Symptoms (Psychotic Signs/Symptoms)  Outcome: Ongoing (interventions implemented as appropriate)      Problem: Fall Risk (Adult)  Goal: Identify Related Risk Factors and Signs and Symptoms  Outcome: Ongoing (interventions implemented as appropriate)    Goal: Absence of Fall  Outcome: Ongoing (interventions implemented as appropriate)      Problem: Physiological Impairment (Excessive Substance Use) (Adult)  Goal: Improved Physiologic Symptoms (Excessive Substance Use)  Outcome: Ongoing (interventions implemented as appropriate)      Problem: Skin Injury Risk (Adult)  Goal: Identify Related Risk Factors and Signs and Symptoms  Outcome: Ongoing (interventions implemented as appropriate)    Goal: Skin Health and Integrity  Outcome: Ongoing (interventions implemented as appropriate)

## 2018-04-12 NOTE — NURSING NOTE
"Behavior   Anxiety: Feeling anxious  Depression: depressed mood  Pain  0  AVH   no  S/I   no  H/I   no    Affect   depressed and anxious    Note:up in day room pleasant and cooperative with good eye contact. \"i fell from a tree stand when I was hunting in 2016. I broke C5,6,7. I also broke T2 and 4. And my sternum. Right hand contracted.\"I cant feel my legs but I can move them. My wife moved out a month ago. I think i'm in here because f the devil. I accepted God. My dog ran off and I told God I gave my life to you and I really like my dogs and 30 minutes later they were back.\" minimal interaction with peers. Flat affect. Pt is med compliant      Intervention  Instructed in medication usage and effects  Medications administered as ordered  Encouraged to verbalize needs. Increase participation in groups.       Response  Verbalized understanding   Did patient take medications as ordered yes   Did patient interact with assessment?  yes     Plan  Will monitor for safety  Will monitor every 15 minutes as ordered  Will evaluate and promote the plan of care    "

## 2018-04-12 NOTE — PLAN OF CARE
Problem: Patient Care Overview  Goal: Discharge Needs Assessment  Outcome: Ongoing (interventions implemented as appropriate)    Goal: Interprofessional Rounds/Family Conf  Outcome: Ongoing (interventions implemented as appropriate)      Problem: Overarching Goals (Adult)  Goal: Adheres to Safety Considerations for Self and Others  Outcome: Ongoing (interventions implemented as appropriate)    Goal: Optimized Coping Skills in Response to Life Stressors  Outcome: Ongoing (interventions implemented as appropriate)    Goal: Develops/Participates in Therapeutic [place] to Support Successful Transition  Outcome: Ongoing (interventions implemented as appropriate)      Problem: Psychomotor Movement Impairment (Psychotic Signs/Symptoms) (Adult)  Goal: Improved Psychomotor Symptoms (Psychotic Signs/Symptoms)  Outcome: Ongoing (interventions implemented as appropriate)      Problem: Fall Risk (Adult)  Goal: Identify Related Risk Factors and Signs and Symptoms  Outcome: Ongoing (interventions implemented as appropriate)    Goal: Absence of Fall  Outcome: Ongoing (interventions implemented as appropriate)      Problem: Physiological Impairment (Excessive Substance Use) (Adult)  Goal: Improved Physiologic Symptoms (Excessive Substance Use)  Outcome: Ongoing (interventions implemented as appropriate)      Problem: Skin Injury Risk (Adult)  Goal: Identify Related Risk Factors and Signs and Symptoms  Outcome: Ongoing (interventions implemented as appropriate)    Goal: Skin Health and Integrity  Outcome: Ongoing (interventions implemented as appropriate)

## 2018-04-12 NOTE — PROGRESS NOTES
4/12/2018    Chief Complaint: psychosis and altered mental status    Subjective:  Patient is a 37 y.o. male who was hospitalized for psychosis and altered mental status.    He still believes he is at [facility]. He rambles on about spiders, dropping off, and other random incoherent topics. He appears to have slept last night since the baclofen and neurontin started. He had an episode of agitation with screaming and threatening.  He apparently slept better last night after I started his baclofen and neurontin.    Objective     Vital Signs    Temp:  [96.6 °F (35.9 °C)-97.5 °F (36.4 °C)] 96.6 °F (35.9 °C)  Heart Rate:  [101-112] 101  Resp:  [18] 18  BP: (105-116)/(55-56) 105/55    Physical Exam:   General Appearance: alert, appears stated age and cooperative,  Hygiene:   good  Gait & Station: Wheelchair  Musculoskeletal: No tremors or abnormal involuntary movements    Mental Status Exam:   Cooperation:  Cooperative  Eye Contact:  scattered  Psychomotor Behavior:  Restless  Mood: confused  Affect:  odd, dazed  Speech:  Normal  Thought Process:  Incoherent  Associations: Tangential  Thought Content:     bizarre   Suicidal:  None   Homicidal:  None   Hallucinations:  Auditory and Visual   Delusion:  Odd delusional thinking  Cognitive Functioning:   Consciousness: awake and Orientation:  Person  Reliability:  poor  Insight:  Poor  Judgement:  Poor  Impulse Control:  Impaired    Lab Results (last 24 hours)     ** No results found for the last 24 hours. **        Imaging Results (last 24 hours)     ** No results found for the last 24 hours. **          Medicine:   Current Facility-Administered Medications:   •  acetaminophen (TYLENOL) tablet 650 mg, 650 mg, Oral, Q4H PRN, Devan Riggins MD, 650 mg at 04/10/18 1209  •  baclofen (LIORESAL) half tablet 5 mg, 5 mg, Oral, TID, Devan Riggins MD, 5 mg at 04/12/18 0823  •  gabapentin (NEURONTIN) capsule 600 mg, 600 mg, Oral, TID, Devan Riggins MD, 600 mg at 04/12/18  0823  •  hydrOXYzine (VISTARIL) capsule 50 mg, 50 mg, Oral, Q6H PRN, Devan Riggins MD, 50 mg at 04/11/18 2019  •  loperamide (IMODIUM) capsule 2 mg, 2 mg, Oral, 4x Daily PRN, Devan Riggins MD  •  LORazepam (ATIVAN) tablet 1 mg, 1 mg, Oral, Q2H PRN, 1 mg at 04/10/18 2031 **OR** LORazepam (ATIVAN) injection 1 mg, 1 mg, Intravenous, Q2H PRN **OR** LORazepam (ATIVAN) tablet 2 mg, 2 mg, Oral, Q1H PRN **OR** LORazepam (ATIVAN) injection 2 mg, 2 mg, Intravenous, Q1H PRN **OR** LORazepam (ATIVAN) injection 2 mg, 2 mg, Intravenous, Q15 Min PRN **OR** LORazepam (ATIVAN) injection 2 mg, 2 mg, Intramuscular, Q15 Min PRN, Devan Riggins MD  •  LORazepam (ATIVAN) tablet 1 mg, 1 mg, Oral, Q8H, Devan Riggins MD, 1 mg at 04/12/18 0823  •  magnesium hydroxide (MILK OF MAGNESIA) suspension 2400 mg/10mL 10 mL, 10 mL, Oral, Daily PRN, Devan Riggins MD, 10 mL at 04/10/18 2331  •  risperiDONE (risperDAL) tablet 1 mg, 1 mg, Oral, TID, Devan Riggins MD, 1 mg at 04/12/18 0823  •  traZODone (DESYREL) tablet 50 mg, 50 mg, Oral, Nightly PRN, Devan Riggins MD, 50 mg at 04/11/18 2019  •  ziprasidone (GEODON) injection 20 mg, 20 mg, Intramuscular, Daily PRN, Devan Riggins MD, 20 mg at 04/10/18 2227    Diagnoses/Assessment:   Principal Problem:    Amphetamine-induced psychotic disorder with hallucinations  Active Problems:    Amphetamine use disorder, severe    Alcohol use disorder, severe, dependence      Treatment Plan:    1) Will continue care for the patient on the behavioral health unit at [facility] to ensure patient safety.  2) Will continue to provide treatment with the unit milieu, activities, therapies and psychopharmacological management.  3) Patient to be placed on or continued on  Q15 minute checks  and Elopement, Aggression and Fall precautions.  4) Pertinent medical issues: Spasticity and complaints of back pain.  Will continue the baclofen 5mg tid.  Will decrease the neurontin  to 300mg tid.  5) Will order following labs: Will order CT-head, folate, b-12 levels, rpr.  6) Will make the following medication changes: Will start thiamine 100mg and multivitamin.  Will decrease the ativan to 0.5mg tid.  Will stop the trazodone and vistaril.  Will start melatonin 6mg qhs.  Will continue the risperdal 1mg tid and add in risperdal 0.5mg tid prn for agitation.  7) Will continue discharge planning as appropriate for patient.  8) Psychotherapy provided: none    Treatment plan and medication risks and benefits discussed with: Patient and Treatment Team    Devan Riggins MD  04/12/18  11:15 AM

## 2018-04-13 LAB
FOLATE SERPL-MCNC: 7.11 NG/ML (ref 2.76–21)
VIT B12 BLD-MCNC: 228 PG/ML (ref 239–931)

## 2018-04-13 PROCEDURE — 97116 GAIT TRAINING THERAPY: CPT

## 2018-04-13 PROCEDURE — 86592 SYPHILIS TEST NON-TREP QUAL: CPT | Performed by: PSYCHIATRY & NEUROLOGY

## 2018-04-13 PROCEDURE — 82607 VITAMIN B-12: CPT | Performed by: PSYCHIATRY & NEUROLOGY

## 2018-04-13 PROCEDURE — 82746 ASSAY OF FOLIC ACID SERUM: CPT | Performed by: PSYCHIATRY & NEUROLOGY

## 2018-04-13 PROCEDURE — 97110 THERAPEUTIC EXERCISES: CPT

## 2018-04-13 PROCEDURE — 99232 SBSQ HOSP IP/OBS MODERATE 35: CPT | Performed by: PSYCHIATRY & NEUROLOGY

## 2018-04-13 PROCEDURE — 97530 THERAPEUTIC ACTIVITIES: CPT

## 2018-04-13 RX ADMIN — RISPERIDONE 1 MG: 1 TABLET ORAL at 17:38

## 2018-04-13 RX ADMIN — GABAPENTIN 300 MG: 300 CAPSULE ORAL at 08:44

## 2018-04-13 RX ADMIN — Medication 100 MG: at 08:44

## 2018-04-13 RX ADMIN — GABAPENTIN 300 MG: 300 CAPSULE ORAL at 17:39

## 2018-04-13 RX ADMIN — GABAPENTIN 300 MG: 300 CAPSULE ORAL at 20:39

## 2018-04-13 RX ADMIN — RISPERIDONE 1 MG: 1 TABLET ORAL at 20:39

## 2018-04-13 RX ADMIN — LORAZEPAM 0.5 MG: 0.5 TABLET ORAL at 06:43

## 2018-04-13 RX ADMIN — RISPERIDONE 1 MG: 1 TABLET ORAL at 08:44

## 2018-04-13 RX ADMIN — Medication 5 MG: at 17:38

## 2018-04-13 RX ADMIN — MELATONIN TAB 3 MG 6 MG: 3 TAB at 20:39

## 2018-04-13 RX ADMIN — THERA TABS 1 TABLET: TAB at 08:45

## 2018-04-13 RX ADMIN — Medication 5 MG: at 08:44

## 2018-04-13 RX ADMIN — Medication 5 MG: at 20:38

## 2018-04-13 NOTE — SIGNIFICANT NOTE
04/13/18 0905   Rehab Treatment   Discipline physical therapy assistant   Reason Treatment Not Performed other (see comments)  (Pt sleeping and unable to arrouse at thsi time. Will check back later.)

## 2018-04-13 NOTE — THERAPY TREATMENT NOTE
Acute Care - Physical Therapy Treatment Note  [facility]     Patient Name: Jean Peñaloza  : 1980  MRN: [id number]  Today's Date: 2018  Onset of Illness/Injury or Date of Surgery: 18  Date of Referral to PT: 18  Referring Physician: Dr. Kelvin Mcqueen    Admit Date: 2018    Visit Dx:    ICD-10-CM ICD-9-CM   1. Impaired functional mobility, balance, gait, and endurance Z74.09 V49.89     Patient Active Problem List   Diagnosis   • Anxiety   • Depression   • Laceration of thumb, right   • Dependence on wheelchair   • Brown-Sequard syndrome   • Cervical pain (neck)   • Hemiparaplegic syndrome   • Muscle spasm   • Sternum pain   • Arthralgia   • Decreased appetite   • Wound cellulitis   • Muscle pain   • Acute pain of right shoulder   • Closed fracture of head of right humerus   • Tendonosis   • Nondisplaced fracture of greater tuberosity of right humerus with routine healing   • Fall   • Amphetamine-induced psychotic disorder with hallucinations   • Amphetamine use disorder, severe   • Alcohol use disorder, severe, dependence       Therapy Treatment          Rehabilitation Treatment Summary     Row Name 18 1305             Treatment Time/Intention    Discipline physical therapy assistant  -TW      Document Type therapy note (daily note)  -TW      Subjective Information complains of;pain  -TW      Mode of Treatment physical therapy;individual therapy  -TW      Patient Effort good  -TW      Existing Precautions/Restrictions fall;seizures  -TW      Recorded by [TW] Boaz Mercado, PTA 18 1437 18 1437      Row Name 18 1415 18 1305          Vital Signs    Pre Systolic BP Rehab --  -  -TW     Pre Treatment Diastolic BP --  -TW 60  -TW     Posttreatment Heart Rate (beats/min) --  -TW 76  -TW     Pre SpO2 (%) --  -TW 98  -TW     O2 Delivery Pre Treatment --  -TW room air  -TW     Pre Patient Position --  -TW Sitting  -TW     Intra Patient Position --   -TW Standing  -TW     Post Patient Position --  -TW Supine  -TW     Recorded by [TW] Boaz Mercado, PTA 04/13/18 1453 04/13/18 1453 [TW] Boaz Mercado, PTA 04/13/18 1453 04/13/18 1453     Row Name 04/13/18 1415 04/13/18 1305          Cognitive Assessment/Intervention- PT/OT    Affect/Mental Status (Cognitive) --  -TW WFL  -TW     Orientation Status (Cognition) --  -TW oriented x 4  -TW     Follows Commands (Cognition) --  -TW 75-90% accuracy  -TW     Cognitive Function (Cognitive) --  -TW WFL  -TW     Personal Safety Interventions --  -TW gait belt;nonskid shoes/slippers when out of bed  -TW     Recorded by [TW] Boaz Mercado, PTA 04/13/18 1453 04/13/18 1453 [TW] Boaz Mercado, PTA 04/13/18 1453 04/13/18 1453     Row Name 04/13/18 1415 04/13/18 1305          Safety Issues, Functional Mobility    Impairments Affecting Function (Mobility) --  -TW balance;coordination;endurance/activity tolerance;muscle tone abnormal;sensation/sensory awareness;strength  -TW     Recorded by [TW] Boaz Mercado, PTA 04/13/18 1453 04/13/18 1453 [TW] Boaz Mercado, PTA 04/13/18 1453 04/13/18 1453     Row Name 04/13/18 1415 04/13/18 1305          Bed Mobility Assessment/Treatment    Bed Mobility Assessment/Treatment --  -TW sit-supine  -TW     Sit-Supine [place] (Bed Mobility) --  -TW supervision  -TW     Bed Mobility, Safety Issues --  -TW decreased use of arms for pushing/pulling;decreased use of legs for bridging/pushing  -TW     Recorded by [TW] Boaz Mercado, PTA 04/13/18 1453 04/13/18 1453 [TW] Boaz Mercado, PTA 04/13/18 1453 04/13/18 1453     Row Name 04/13/18 1415 04/13/18 1305          Transfer Assessment/Treatment    Sit-Stand [place] (Transfers) --  -TW contact guard  -TW     Stand-Sit [place] (Transfers) --  -TW contact guard  -TW     Recorded by [TW] Boaz Mercado, PTA 04/13/18 1453 04/13/18 1453 [TW] Boaz Mercado, PTA 04/13/18 1453 04/13/18 1453     Row Name  04/13/18 1415 04/13/18 1305          Gait/Stairs Assessment/Training    Gait/Stairs Assessment/Training --  -TW gait/ambulation independence  -TW     [place] Level (Gait) --  -TW contact guard  -TW     Assistive Device (Gait) --  -TW other (see comments)   Pt used counter to assist with balance.  -TW     Distance in Feet (Gait) --  -TW 44ft  -TW     Comment (Gait/Stairs) --  -TW Pt has difficulty maintaining controll of R LE throughout gait.  -TW     Recorded by [TW] Boaz Mercado, PTA 04/13/18 1453 04/13/18 1453 [TW] Boaz Mercado PTA 04/13/18 1453 04/13/18 1453     Row Name 04/13/18 1305             Therapeutic Exercise    Lower Extremity (Therapeutic Exercise) LAQ (long arc quad), bilateral;marching while seated  -TW      Lower Extremity Range of Motion (Therapeutic Exercise) hip abduction/adduction, bilateral;ankle dorsiflexion/plantar flexion, bilateral  -TW      Exercise Type (Therapeutic Exercise) AROM (active range of motion)  -TW      Position (Therapeutic Exercise) seated  -TW      Sets/Reps (Therapeutic Exercise) 1/20  -TW      Recorded by [TW] Boaz Mercado, PTA 04/13/18 1453 04/13/18 1453      Row Name 04/13/18 1305             Positioning and Restraints    Pre-Treatment Position sitting in chair/recliner  -TW      Post Treatment Position bed  -TW      In Bed supine;call light within reach;encouraged to call for assist;patient within staff view  -TW      Recorded by [TW] Boaz Mercado, PTA 04/13/18 1453 04/13/18 1453      Row Name 04/13/18 1305             Pain Scale: Numbers Pre/Post-Treatment    Pain Scale: Numbers, Pretreatment 5/10  -TW      Pain Scale: Numbers, Post-Treatment 5/10  -TW      Pain Location - Side Right  -TW      Pain Location --   LEG  -TW      Pain Intervention(s) Repositioned  -TW      Recorded by [TW] Boaz Mercado, PTA 04/13/18 1453 04/13/18 1453      Row Name                Wound 04/09/18 1900 Right upper arm abscess    Wound - Properties Group  Date first assessed: 04/09/18 [DP] Time first assessed: 1900 [DP] Present On Admission : yes [DP] Side: Right [DP] Orientation: upper [DP] Location: arm [DP] Type: abscess [DP] Recorded by:  [DP] Griselda Lim RN 04/09/18 2157 04/09/18 2157    Row Name 04/13/18 1305             Outcome Summary/Treatment Plan (PT)    Daily Summary of Progress (PT) progress towards functional goals is fair  -TW      Barriers to Overall Progress (PT) Muscle tone and strength in R LE.  -TW      Plan for Continued Treatment (PT) Cont  -TW      Anticipated Discharge Disposition (PT) home or self care  -TW      Recorded by [TW] Boaz Mercado, PTA 04/13/18 1453 04/13/18 1453        User Key  (r) = Recorded By, (t) = Taken By, (c) = Cosigned By    Initials Name Effective Dates Discipline    DP Griselda Lim RN 04/06/18 -  Nurse    TW Boaz Mercado PTA 03/07/18 -  PT                       PT Rehab Goals     Row Name 04/13/18 1305             Transfer Goal 1 (PT)    Activity/Assistive Device (Transfer Goal 1, PT) sit-to-stand/stand-to-sit;bed-to-chair/chair-to-bed;wheelchair transfer  -TW      [place] Level/Cues Needed (Transfer Goal 1, PT) independent;conditional independence  -TW      Time Frame (Transfer Goal 1, PT) by discharge  -TW      Progress/Outcome (Transfer Goal 1, PT) goal not met  -TW         Gait Training Goal 1 (PT)    Activity/Assistive Device (Gait Training Goal 1, PT) gait (walking locomotion)  -TW      [place] Level (Gait Training Goal 1, PT) independent;conditional independence  -TW      Distance (Gait Goal 1, PT) 150ft  -TW      Time Frame (Gait Training Goal 1, PT) by discharge  -TW      Progress/Outcome (Gait Training Goal 1, PT) goal not met  -TW         Strength Goal 1 (PT)    Strength Goal 1 (PT) Pt will tolerate 2 sets of 15 reps AAROM/AROM exercises with VSS  -TW      Time Frame (Strength Goal 1, PT) by discharge  -TW      Progress/Outcome (Strength Goal 1, PT) goal not met  -TW        User  Key  (r) = Recorded By, (t) = Taken By, (c) = Cosigned By    Initials Name Provider Type Discipline     Boaz Mercado PTA Physical Therapy Assistant PT          Physical Therapy Education     Title: PT OT SLP Therapies (Active)     Topic: Physical Therapy (Active)     Point: Mobility training (Active)    Learning Progress Summary     Learner Status Readiness Method Response Comment Documented by    Patient Active Acceptance E NR   04/12/18 1934          Point: Precautions (Active)    Learning Progress Summary     Learner Status Readiness Method Response Comment Documented by    Patient Active Acceptance E NR   04/12/18 1934                      User Key     Initials Effective Dates Name Provider Type Discipline     04/03/18 -  Tabitha Reardon, PT Physical Therapist PT                    PT Recommendation and Plan  Anticipated Discharge Disposition (PT): home or self care  Outcome Summary/Treatment Plan (PT)  Daily Summary of Progress (PT): progress towards functional goals is fair  Barriers to Overall Progress (PT): Muscle tone and strength in R LE.  Plan for Continued Treatment (PT): Cont  Anticipated Discharge Disposition (PT): home or self care  Plan of Care Reviewed With: patient  Progress: no change  Outcome Summary: No change in goal status at this time. Pt working well with therapy at this time.          Outcome Measures     Row Name 04/13/18 1305 04/12/18 1730          How much help from another person do you currently need...    Turning from your back to your side while in flat bed without using bedrails? 4  -TW 4  -LIONEL     Moving from lying on back to sitting on the side of a flat bed without bedrails? 4  -TW 4  -LIONEL     Moving to and from a bed to a chair (including a wheelchair)? 3  -TW 3  -LIONEL     Standing up from a chair using your arms (e.g., wheelchair, bedside chair)? 3  -TW 3  -LIONEL     Climbing 3-5 steps with a railing? 2  -TW 2  -LIONEL     To walk in hospital room? 3  -TW 3  -LIONEL     AM-PAC 6  Clicks Score 19  -TW 19  -LIONEL        Functional Assessment    Outcome Measure Options AM-PAC 6 Clicks Basic Mobility (PT)  -TW AM-PAC 6 Clicks Basic Mobility (PT)  -LIONEL       User Key  (r) = Recorded By, (t) = Taken By, (c) = Cosigned By    Initials Name Provider Type    LIONEL Reardon, PT Physical Therapist    TW Boaz Mercado PTA Physical Therapy Assistant           Time Calculation:         PT Charges     Row Name 04/13/18 1455             Time Calculation    Start Time 1305  -TW      Stop Time 1344  -TW      Time Calculation (min) 39 min  -TW      PT Received On 04/13/18  -TW      PT Goal Re-Cert Due Date 04/25/18  -TW         Time Calculation- PT    Total Timed Code Minutes- PT 39 minute(s)  -TW        User Key  (r) = Recorded By, (t) = Taken By, (c) = Cosigned By    Initials Name Provider Type     Boaz Mercado PTA Physical Therapy Assistant          Therapy Charges for Today     Code Description Service Date Service Provider Modifiers Qty    22512231696 HC PT THER SUPP EA 15 MIN 4/13/2018 Boaz Mercado PTA GP 1    28229460992 HC PT THER SUPP EA 15 MIN 4/13/2018 Boaz Mercado PTA GP 1    42157270755 HC GAIT TRAINING EA 15 MIN 4/13/2018 Boaz Mercado PTA GP 1    34570472969 HC PT THERAPEUTIC ACT EA 15 MIN 4/13/2018 Boaz Mercado PTA GP 1    34165865320 HC PT THER PROC EA 15 MIN 4/13/2018 Boaz Mercado PTA GP 1          PT G-Codes  PT Professional Judgement Used?: Yes  Outcome Measure Options: AM-PAC 6 Clicks Basic Mobility (PT)  Score: 19  Functional Limitation: Mobility: Walking and moving around  Mobility: Walking and Moving Around Current Status (): At least 20 percent but less than 40 percent impaired, limited or restricted  Mobility: Walking and Moving Around Goal Status (): At least 1 percent but less than 20 percent impaired, limited or restricted    Boaz Mercado PTA  4/13/2018

## 2018-04-13 NOTE — PLAN OF CARE
Problem: Patient Care Overview  Goal: Plan of Care Review  Outcome: Ongoing (interventions implemented as appropriate)   04/13/18 0143   Coping/Psychosocial   Plan of Care Reviewed With patient   Plan of Care Review   Progress improving   Coping/Psychosocial   Patient Agreement with Plan of Care agrees     Goal: Individualization and Mutuality  Outcome: Ongoing (interventions implemented as appropriate)    Goal: Discharge Needs Assessment  Outcome: Ongoing (interventions implemented as appropriate)      Problem: Overarching Goals (Adult)  Goal: Adheres to Safety Considerations for Self and Others  Outcome: Ongoing (interventions implemented as appropriate)    Goal: Optimized Coping Skills in Response to Life Stressors  Outcome: Ongoing (interventions implemented as appropriate)    Goal: Develops/Participates in Therapeutic [place] to Support Successful Transition  Outcome: Ongoing (interventions implemented as appropriate)

## 2018-04-13 NOTE — NURSING NOTE
Behavior   Anxiety: denies  Depression: depressed mood  Pain  0  AVH   no  S/I   no  H/I   no    Affect   calm and pleasant    Note:   Pt has been resting in room since the beginning of shift. Pt has not been observed responding to internal stimuli. Pt has had clearer thought process and is able to recall some communications in the past couple days. Pt is peacefully sleeping at this time.     Intervention  Instructed in medication usage and effects  Medications administered as ordered  Encouraged to verbalize needs      Response  Verbalized understanding   Did patient take medications as ordered yes   Did patient interact with assessment?  yes     Plan  Will monitor for safety  Will monitor every 15 minutes as ordered  Will evaluate and promote the plan of care

## 2018-04-13 NOTE — PLAN OF CARE
Problem: Patient Care Overview  Goal: Plan of Care Review  Outcome: Ongoing (interventions implemented as appropriate)  Encourage intake of meals and supplement.   04/13/18 3755   Coping/Psychosocial   Plan of Care Reviewed With patient   Plan of Care Review   Progress improving   OTHER   Outcome Summary Intake 100% - 4x, 75% - 1x, 0% - 2x.

## 2018-04-13 NOTE — THERAPY EVALUATION
Acute Care - Physical Therapy Initial Evaluation  [facility]     Patient Name: Jean Peñaloza  : 1980  MRN: [id number]  Today's Date: 2018   Onset of Illness/Injury or Date of Surgery: 18  Date of Referral to PT: 18  Referring Physician: Dr. Kelvin Mcqueen      Admit Date: 2018    Visit Dx:     ICD-10-CM ICD-9-CM   1. Impaired functional mobility, balance, gait, and endurance Z74.09 V49.89     Patient Active Problem List   Diagnosis   • Anxiety   • Depression   • Laceration of thumb, right   • Dependence on wheelchair   • Brown-Sequard syndrome   • Cervical pain (neck)   • Hemiparaplegic syndrome   • Muscle spasm   • Sternum pain   • Arthralgia   • Decreased appetite   • Wound cellulitis   • Muscle pain   • Acute pain of right shoulder   • Closed fracture of head of right humerus   • Tendonosis   • Nondisplaced fracture of greater tuberosity of right humerus with routine healing   • Fall   • Amphetamine-induced psychotic disorder with hallucinations   • Amphetamine use disorder, severe   • Alcohol use disorder, severe, dependence     Past Medical History:   Diagnosis Date   • Acute bronchitis    • Anxiety    • Cluster headache 2015   • Conjunctivitis 2014   • Depression    • Dorsalgia    • Malaise    • Panic disorder    • Shoulder pain     severe on right        Past Surgical History:   Procedure Laterality Date   • INJECTION OF MEDICATION  2015    KENALOG(2)   • NECK SURGERY  2016    [facility]        PT ASSESSMENT (last 12 hours)      Physical Therapy Evaluation     Row Name 18 1800 18 1730       PT Evaluation Time/Intention    Subjective Information complains of;pain  -LIONEL  --    Document Type evaluation  -LIONEL evaluation  -LIONEL    Mode of Treatment physical therapy;individual therapy  -LIONEL physical therapy;individual therapy  -LIONEL    Patient Effort good  -LIONEL good  -LIONEL    Symptoms Noted During/After Treatment increased pain  -LIONEL  --    Comment  "Recent R shoulder fx(3/5/2018); pt reports allowed activity as tolerated. avoid activities that increase pain. May need to make sure to transfer to L side to from w/c  -LIONEL recemt R shoulder fx(3/5/2018);pt reports allowed activity as tolerated, avoid activities that increase pain. May need to make sure to transfer to L side to/from w/c  -LIONEL    Row Name 04/12/18 1800 04/12/18 1730       General Information    Patient Profile Reviewed? yes  -LIONEL yes  -LIONEL    Onset of Illness/Injury or Date of Surgery 04/09/18  -LIONEL  --    Referring Physician Dr. Kelvin Mcqueen  -LIONEL  --    Patient Observations agree to therapy;cooperative;alert   attempted PT in am pt too lethargic to participate  -LIONEL  --    General Observations of Patient pt supine  -LIONEL  --    Prior Level of Function independent:;all household mobility;gait;transfer;w/c or scooter;ADL's   shared IADL's with family  -LIONEL  --    Equipment Currently Used at Home bath bench;cane, straight;wheelchair   pt may have RW(he was not certain)  -LIONEL bath bench;cane, straight;wheelchair   pt may have RW(he was not certain)  -LIONEL    Pertinent History of Current Functional Problem Pt admitted to [facility] with amphetamine induced pyschotic disorder with hallucinations, with h/o spinal cord injury with chronic spasticity of legs   -LIONEL  --    Existing Precautions/Restrictions fall;seizures  -LIONEL fall;seizures  -LIONEL    Limitations/Impairments insensate body part  -LIONEL insensate body part  -LIONEL    Risks Reviewed patient:;LOB;dizziness;increased discomfort;change in vital signs  -LIONEL  --    Benefits Reviewed patient:;improve function;increase independence;increase strength;increase balance;decrease pain  -LIONEL  --    Barriers to Rehab medically complex;impaired sensation  -LIONEL  --    Row Name 04/12/18 1800 04/12/18 1730       Relationship/Environment    Lives With other (see comments)   \"family\"  -LIONEL other (see comments)   family  -LIONEL    Row Name 04/12/18 1800 04/12/18 1730       Resource/Environmental " Concerns    Current Living Arrangements home/apartment/condo  -LIONEL home/apartment/condo  -LIONEL    Row Name 04/12/18 1800          Cognitive Assessment/Interventions    Additional Documentation Cognitive Assessment/Intervention (Group)  -     Row Name 04/12/18 1800 04/12/18 1730       Cognitive Assessment/Intervention- PT/OT    Affect/Mental Status (Cognitive) WFL  - WFL  -    Orientation Status (Cognition) oriented x 4  -LIONEL oriented x 4  -LIONEL    Follows Commands (Cognition) over 90% accuracy;follows multi-step commands  -LIONEL over 90% accuracy;follows multi-step commands  -LIONEL    Cognitive Function (Cognitive) WFL  -LIONEL WFL  -    Safety Deficit (Cognitive) moderate deficit  - moderate deficit  -    Personal Safety Interventions gait belt;nonskid shoes/slippers when out of bed;supervised activity  -  --    Row Name 04/12/18 1800 04/12/18 1730       Safety Issues, Functional Mobility    Safety Issues Affecting Function (Mobility) at risk behavior observed;awareness of need for assistance;safety precaution awareness   does not lock w/c,walks away from bed before insuringbalance  -  --    Impairments Affecting Function (Mobility) endurance/activity tolerance;coordination;balance;muscle tone abnormal;sensation/sensory awareness;strength  - endurance/activity tolerance;coordination;balance;muscle tone abnormal;sensation/sensory awareness;strength  -    Row Name 04/12/18 1800 04/12/18 1730       Bed Mobility Assessment/Treatment    Bed Mobility Assessment/Treatment supine-sit-supine  -LIONEL supine-sit-supine  -LIONEL    Supine-Sit-Supine [place] (Bed Mobility) supervision  - supervision  -    Bed Mobility, Safety Issues decreased use of arms for pushing/pulling;decreased use of legs for bridging/pushing  - decreased use of arms for pushing/pulling;decreased use of legs for bridging/pushing  -    Assistive Device (Bed Mobility) bed rails  - bed rails  -    Row Name 04/12/18 1800 04/12/18 1730        Transfer Assessment/Treatment    Transfer Assessment/Treatment sit-stand transfer;stand-sit transfer;wheelchair transfer  -LIONEL sit-stand transfer;stand-sit transfer;wheelchair transfer  -LIONEL    Sit-Stand [place] (Transfers) contact guard  -LIONEL contact guard  -LIONEL    Stand-Sit [place] (Transfers) contact guard  -LIONEL contact guard  -LIONEL    Row Name 04/12/18 1800 04/12/18 1730       Wheelchair Transfer    Type (Wheelchair Transfer) lateral  -  --    [place] Level (Wheelchair Transfer) supervision  - supervision  -[facility] Name 04/12/18 1800 04/12/18 1730       Gait/Stairs Assessment/Training    Gait/Stairs Assessment/Training gait/ambulation independence  -LIONEL gait/ambulation independence  -LIONEL    [place] Level (Gait) contact guard  -LIONEL contact guard  -LIONEL    Distance in Feet (Gait) 40  -LIONEL  --    [facility] Name 04/12/18 1800 04/12/18 1730       General ROM    GENERAL ROM COMMENTS RUE: cannot make complete fist, AROM WFL wrist, elbow, shoulder to 95degrees before painful to shoulder;LUE: AROM WFL;RLE: AROM WFL ankle/foot, active knee, hip flexion to50 degrees active assistive  WFL,LLE: AROM WFL  -LIONEL RUE: cannot make complete fist, AROM WFL wrist, elbow, shoulder to 95degrees before painful to shoulder;LUE: AROm WFL; RLE: AROM WFL ankle/foot, active/assistive ROM WFL knee, hip with active knee, hip flexion to 50degrees  -[facility] Name 04/12/18 1800 04/12/18 1730       General Assessment (Manual Muscle Testing)    General Manual Muscle Testing (MMT) Assessment upper extremity strength deficits identified;lower extremity strength deficits identified  - upper extremity strength deficits identified;lower extremity strength deficits identified  -    Row Name 04/12/18 1800 04/12/18 1730       Upper Extremity (Manual Muscle Testing)    Comment, MMT: Upper Extremity RUE: 2/5 , 3/5 wrist, elbow, 2/5 shoulder;LUE: 4/5 , wrist, elbow, shoulder  -LIONEL RUE: 2/5 , 3/5 wrist,elbow, shoulder;LUE 4/5 ,  wrist, elbow, shoulder  -    Row Name 04/12/18 1800 04/12/18 1730       Lower Extremity (Manual Muscle Testing)    Comment, MMT: Lower Extremity RLE: 3/5 ankle/foot, 2/5 knee, hip;LLE: 4/5 ankle/foot, knee, hip  -LIONEL RLE: 3/5 ankle/foot, 2/5 knee, hip;LLE: 4/5 ankle/foot, knee, hip  -    Row Name 04/12/18 1800 04/12/18 1730       Sensory Assessment/Intervention    Sensory General Assessment light touch sensation deficits identified  - light touch sensation deficits identified  -    Row Name 04/12/18 1800 04/12/18 1730       Vision Assessment/Intervention    Visual Impairment/Limitations WFL  -LIONEL WFL  -    Row Name 04/12/18 1800 04/12/18 1730       Light Touch Sensation Assessment    Left Upper Extremity: Light Touch Sensation Assessment mild impairment, 75% or more correct responses   reports decreased volar surface of forearm s/p SCI  -LIONEL mild impairment, 75% or more correct responses   reports decreased volar surface of forearm s/p SCI  -LIONEL    Comment, Left Upper Extremity: Light Touch Sensation Assessment  -- reports decreased sensation to light touch to volar surface of forearm  -    Right Upper Extremity: Light Touch Sensation Assessment mild impairment, 75% or more correct responses  -LIONEL mild impairment, 75% or more correct responses  -LIONEL    Comment, Right Upper Extremity: Light Touch Sensation Assessment reports decreased sensation to light touch volar surface of forearm s/p SCI  - reports decreased sensaton to light touch volar surface of forearm  -LIONEL    Left Lower Extremity: Light Touch Sensation Assessment severe impairment, less than 50% correct responses  -LIONEL severe impairment, less than 50% correct responses  -LIONEL    Comment, Left Lower Extremity: Light Touch Sensation Assessment Pt says leg extremely numb  -LIONEL Pt says leg extremely numb   -    Right Lower Extremity: Light Touch Sensation Assessment other (see comments)  -LIONEL other (see comments)  -LIONEL    Comment, Right Lower Extremity:  Light Touch Sensation Assessment Pt reports hypersensitivity since SCI  - Pt reports leg is hypersensitive  -    Row Name 04/12/18 1800 04/12/18 1730       Pain Assessment    Additional Documentation Pain Scale: Numbers Pre/Post-Treatment (Group)  - Pain Scale: Numbers Pre/Post-Treatment (Group)  -    Row Name 04/12/18 1800 04/12/18 1730       Pain Scale: Numbers Pre/Post-Treatment    Pain Scale: Numbers, Pretreatment 4/10  - 4/10  -    Pain Scale: Numbers, Post-Treatment 6/10  - 6/10  -    Pain Location - Side Right  -LIONEL Right  -LIONEL    Pain Location shoulder  - shoulder  -    Pain Intervention(s) Rest  -  --    Row Name 04/12/18 1730          Respiratory WDL    Rhythm/Pattern, Respiratory depth regular;unlabored;pattern regular;no shortness of breath reported  -[facility] Name 04/12/18 1730          Skin WDL    Skin WDL ex  -     Skin Color/Characteristics other (see comments)   scattered sores  -     Skin Temperature warm  -     Skin Moisture dry  -     Skin Elasticity firm  -     Skin Integrity wound   RUE  -[facility] Name 04/12/18 1730          Wound 04/09/18 1900 Right upper arm abscess    Wound - Properties Group Date first assessed: 04/09/18  -DP Time first assessed: 1900  -DP Present On Admission : yes  -DP Side: Right  -DP Orientation: upper  -DP Location: arm  -DP Type: abscess  -DP    Dressing Appearance --   dressing had come off in the bed   -[facility] Name 04/12/18 1800 04/12/18 1730       Plan of Care Review    Plan of Care Reviewed With --  - patient  -[facility] Name 04/12/18 1800 04/12/18 1730       Physical Therapy Clinical Impression    Date of Referral to PT --  -LIONEL 04/11/18  -    PT Diagnosis (PT Clinical Impression) --  - impaired physical mobility;h.o spinal cord injury  -    Prognosis (PT Clinical Impression) --  -LIONEL good  -    Patient/Family Goals Statement (PT Clinical Impression) --  -LIONEL return to PLOF  -    Criteria for Skilled Interventions  Met (PT Clinical Impression) --  -LIONEL yes;treatment indicated  -LIONEL    Pathology/Pathophysiology Noted (Describe Specifically for Each System) --  -LIONEL musculoskeletal;neuromuscular  -LIONEL    Impairments Found (describe specific impairments) --  -LIONEL aerobic capacity/endurance;gait, locomotion, and balance;ROM;sensory Integrity;motor function;muscle performance  -LIONEL    Functional Limitations in Following Categories (Describe Specific Limitations) --  -LIONEL  --    Disability: Inability to Perform Actions/Activities of Required Roles (describe specific disability) --  -LIONEL  --    Rehab Potential (PT Clinical Summary) --  -LIONEL good, to achieve stated therapy goals  -LIONEL    Predicted Duration of Therapy (PT) --  -LIONEL until discharge or goals met  -LIONEL    Care Plan Review (PT) --  -LIONEL evaluation/treatment results reviewed;care plan/treatment goals reviewed;risks/benefits reviewed;current/potential barriers reviewed;patient/other agree to care plan  -LIONEL    Row Name 04/12/18 1800 04/12/18 1730       Vital Signs    Pre Systolic BP Rehab --  -LIONEL 114  -LIONEL    Pre Treatment Diastolic BP --  -LIONEL 57  -LIONEL    Post Systolic BP Rehab --  -LIONEL 119  -LIONEL    Post Treatment Diastolic BP --  -LIONEL 62  -LIONEL    Pretreatment Heart Rate (beats/min) --  -LIONEL 104  -LIONEL    Posttreatment Heart Rate (beats/min) --  -LIONEL 94  -LIONEL    Pre SpO2 (%) --  -LIONEL 97  -LIONEL    O2 Delivery Pre Treatment --  -LIONEL room air  -LIONEL    Post SpO2 (%) --  -LIONEL 93  -LIONEL    O2 Delivery Post Treatment --  -LIONEL room air  -LIONEL    Pre Patient Position --  -LIONEL Supine  -LIONEL    Post Patient Position --  -LIONEL Supine  -LIONEL    Row Name 04/12/18 1800 04/12/18 1730       Physical Therapy Goals    Transfer Goal Selection (PT) --  -LIONEL transfer, PT goal 1  -LIONEL    Gait Training Goal Selection (PT) --  -LIONEL gait training, PT goal 1  -LIONEL    Strength Goal Selection (PT) --  -LIONEL strength, PT goal 1  -LIONEL    Balance Goal Selection (PT) --  - balance, PT goal 1  -LIONEL    Additional Documentation --  -LIONEL  --    Row Name  04/12/18 1800 04/12/18 1730       Transfer Goal 1 (PT)    Activity/Assistive Device (Transfer Goal 1, PT) --  -LIONEL sit-to-stand/stand-to-sit;bed-to-chair/chair-to-bed;wheelchair transfer  -LIONEL    [place] Level/Cues Needed (Transfer Goal 1, PT) --  -LIONEL independent;conditional independence  -LIONEL    Time Frame (Transfer Goal 1, PT) --  -LIONEL by discharge  -LIONEL    Progress/Outcome (Transfer Goal 1, PT) --  -LIONEL goal not met  -LIONEL    Row Name 04/12/18 1800 04/12/18 1730       Gait Training Goal 1 (PT)    Activity/Assistive Device (Gait Training Goal 1, PT) --  -LIONEL gait (walking locomotion)  -LIONEL    [place] Level (Gait Training Goal 1, PT) --  -LIONEL independent;conditional independence  -LIONEL    Distance (Gait Goal 1, PT) --  -LIONEL 150ft  -LIONEL    Time Frame (Gait Training Goal 1, PT) --  -LIONEL by discharge  -LIONEL    Progress/Outcome (Gait Training Goal 1, PT) --  -LIONEL goal not met  -LIONEL    Row Name 04/12/18 1800 04/12/18 1730       Strength Goal 1 (PT)    Strength Goal 1 (PT) --  -LIONEL Pt will tolerate 2 sets of 15 reps AAROM/AROM exercises with VSS  -LIONEL    Time Frame (Strength Goal 1, PT) --  -LIONEL by discharge  -LIONEL    Progress/Outcome (Strength Goal 1, PT) --  -LIONEL goal not met  -LIONEL    Row Name 04/12/18 1800 04/12/18 1730       Balance Goal 1 (PT)    Activity/Assistive Device (Balance Goal 1, PT) --  -LIONEL standing, static;standing, dynamic  -LIONEL    [place] Level/Cues Needed (Balance Goal 1, PT) --  -LIONEL independent  -LIONEL    Time Frame (Balance Goal 1, PT) --  -LIONEL by discharge  -LIONEL    Progress/Outcomes (Balance Goal 1, PT) --  -LIONEL goal not met  -LIONEL    Row Name 04/12/18 1800 04/12/18 1730       Positioning and Restraints    Pre-Treatment Position --  -LIONEL in bed  -LIONEL    Post Treatment Position --  -LIONEL bed  -LIONEL    In Bed --  -LIONEL supine;call light within reach;encouraged to call for assist;exit alarm on;with family/caregiver  -LIONEL    Row Name 04/12/18 1800          Living Environment    Home Accessibility wheelchair accessible   has ramp   -       User Key  (r) = Recorded By, (t) = Taken By, (c) = Cosigned By    Initials Name Provider Type    LIONEL Tabitha Reardon, PT Physical Therapist    SCOTT Lim RN Registered Nurse          Physical Therapy Education     Title: PT OT SLP Therapies (Active)     Topic: Physical Therapy (Active)     Point: Mobility training (Active)    Learning Progress Summary     Learner Status Readiness Method Response Comment Documented by    Patient Active Acceptance E NR   04/12/18 1934          Point: Precautions (Active)    Learning Progress Summary     Learner Status Readiness Method Response Comment Documented by    Patient Active Acceptance E NR   04/12/18 1934                      User Key     Initials Effective Dates Name Provider Type [facility] 04/03/18 -  Tabitha Reardon PT Physical Therapist PT                PT Recommendation and Plan  Anticipated Discharge Disposition (PT): home or self care (may benefit from outpatient PT )  Planned Therapy Interventions (PT Eval): balance training, gait training, home exercise program, neuromuscular re-education, patient/family education, ROM (range of motion), strengthening, transfer training, wheelchair management/propulsion training  Therapy Frequency (PT Clinical Impression): other (see comments) (5-14 times per week)  Outcome Summary/Treatment Plan (PT)  Anticipated Discharge Disposition (PT): home or self care (may benefit from outpatient PT )  Plan of Care Reviewed With: patient  Outcome Summary: PT evaluation completed. Pt transferred sit to stand with CGA and ambulated 40 ft with CGA. Function limited primarily by decreased strength, balance, and tolerance for functional mobiltiy and activities. Pt  will benefit from PT to improve transfer and gait ability and safety awareness. Anticipate home with family at discharge. Pt may benefit from outpatient PT for conditioning.          Outcome Measures     Row Name 04/12/18 6240             How much help from  another person do you currently need...    Turning from your back to your side while in flat bed without using bedrails? 4  -LIONEL      Moving from lying on back to sitting on the side of a flat bed without bedrails? 4  -LIONEL      Moving to and from a bed to a chair (including a wheelchair)? 3  -LIONEL      Standing up from a chair using your arms (e.g., wheelchair, bedside chair)? 3  -LIONEL      Climbing 3-5 steps with a railing? 2  -LIONEL      To walk in hospital room? 3  -LIONEL      AM-PAC 6 Clicks Score 19  -LIONEL         Functional Assessment    Outcome Measure Options AM-PAC 6 Clicks Basic Mobility (PT)  -        User Key  (r) = Recorded By, (t) = Taken By, (c) = Cosigned By    Initials Name Provider Type    LIONEL Reardon PT Physical Therapist           Time Calculation:         PT Charges     Row Name 04/12/18 1937             Time Calculation    Start Time 1730  -LIONEL      Stop Time 1755  -      Time Calculation (min) 25 min  -      PT Received On 04/12/18  -      PT Goal Re-Cert Due Date 04/25/18  -         Time Calculation- PT    Total Timed Code Minutes- PT 10 minute(s)  -        User Key  (r) = Recorded By, (t) = Taken By, (c) = Cosigned By    Initials Name Provider Type    LIONEL Reardon PT Physical Therapist          Therapy Charges for Today     Code Description Service Date Service Provider Modifiers Qty    03685026693 HC PT MOBILITY CURRENT 4/12/2018 Tabitha Reardon, PT GP, CJ 1    01314689684 HC PT MOBILITY PROJECTED 4/12/2018 Tabitha Reardon, PT GP, CI 1    [id number] HC PT EVAL MOD COMPLEXITY 1 4/12/2018 Tabitha Reardon, PT GP 1    [id number] HC GAIT TRAINING EA 15 MIN 4/12/2018 Tabitha Reardon, PT GP 1          PT G-Codes  PT Professional Judgement Used?: Yes  Outcome Measure Options: AM-PAC 6 Clicks Basic Mobility (PT)  Score: 19  Functional Limitation: Mobility: Walking and moving around  Mobility: Walking and Moving Around Current Status (): At least 20 percent but less than 40 percent impaired,  limited or restricted  Mobility: Walking and Moving Around Goal Status (): At least 1 percent but less than 20 percent impaired, limited or restricted      Tabitha Reardon, PT  4/12/2018

## 2018-04-13 NOTE — PLAN OF CARE
Problem: Patient Care Overview  Goal: Plan of Care Review  Outcome: Ongoing (interventions implemented as appropriate)   04/13/18 1305   Coping/Psychosocial   Plan of Care Reviewed With patient   Plan of Care Review   Progress no change   OTHER   Outcome Summary No change in goal status at this time. Pt working well with therapy at this time.   Coping/Psychosocial   Patient Agreement with Plan of Care agrees

## 2018-04-13 NOTE — PROGRESS NOTES
4/13/2018    Chief Complaint: psychosis and altered mental status    Subjective:  Patient is a 37 y.o. male who was hospitalized for psychosis and altered mental status.   Patient was able to tell me that he is currently at the hospital.  He stated [place] and then [place].  He new the year, month and day.  He appeared a little confused and did take some time to find these answers.  There were no behavioral issues reported by staff last night.  He has been cooperative this morning.  I did not observe any responding to hallucinations.    Objective     Vital Signs    Temp:  [97.3 °F (36.3 °C)-98.4 °F (36.9 °C)] 97.3 °F (36.3 °C)  Heart Rate:  [69-99] 69  Resp:  [18] 18  BP: (115-137)/(59-66) 137/59    Physical Exam:   General Appearance: alert, appears stated age and cooperative,  Hygiene:   good  Gait & Station: Wheelchair  Musculoskeletal: No tremors or abnormal involuntary movements    Mental Status Exam:   Cooperation:  Cooperative  Eye Contact:  scattered  Psychomotor Behavior:  Appropriate  Mood: Sad/Depressed  Affect:  constricted  Speech:  Normal  Thought Process:  Coherent and Poverty of thought  Associations: Goal Directed  Thought Content:     Normal   Suicidal:  None   Homicidal:  None   Hallucinations:  Not demonstrated today   Delusion:  Unable to demonstrate  Cognitive Functioning:   Consciousness: awake and Orientation:  Person, Place, Time and Situation  Reliability:  fair  Insight:  Fair  Judgement:  Impaired  Impulse Control:  Impaired    Lab Results (last 24 hours)     Procedure Component Value Units Date/Time    Folate [[id number]]  (Normal) Collected:  04/13/18 0605    Specimen:  Blood Updated:  04/13/18 0806     Folate 7.11 ng/mL     Vitamin B12 [[id number]]  (Abnormal) Collected:  04/13/18 0605    Specimen:  Blood Updated:  04/13/18 0806     Vitamin B-12 228 (L) pg/mL     RPR [[id number]] Collected:  04/13/18 0605    Specimen:  Blood Updated:  04/13/18 0635        Imaging Results (last 24  hours)     Procedure Component Value Units Date/Time    CT Head Without Contrast [[id number]] Collected:  04/12/18 1603     Updated:  04/12/18 1624    Narrative:         CT Head Without Contrast    History: Altered mental status.    Axial scans of the brain were obtained without intravenous  contrast.  Coronal and sagital reconstructions were preformed.    This exam was performed according to our departmental  dose-optimization program, which includes automated exposure  control, adjustment of the mA and/or kV according to patient size  and/or use of iterative reconstruction technique.    DLP: 942.10    Comparison: December 17, 2016.    Bone windows are unremarkable.  The visualized paranasal sinuses are unremarkable.    No hemorrhage.  No mass.  No abnormal areas of increased or decreased attenuation.  No midline shift.  No abnormal extra-axial fluid collections.      Impression:       CONCLUSION:  Normal nonenhanced CT of the brain    [id number]    Electronically signed by:  Uriah Cutler MD  4/12/2018 4:23 PM CDT  Workstation: [id number]          Medicine:   Current Facility-Administered Medications:   •  acetaminophen (TYLENOL) tablet 650 mg, 650 mg, Oral, Q4H PRN, Devan Riggins MD, 650 mg at 04/10/18 1209  •  baclofen (LIORESAL) half tablet 5 mg, 5 mg, Oral, TID, Devan Riggins MD, 5 mg at 04/13/18 0844  •  gabapentin (NEURONTIN) capsule 300 mg, 300 mg, Oral, TID, Devan Riggins MD, 300 mg at 04/13/18 0844  •  loperamide (IMODIUM) capsule 2 mg, 2 mg, Oral, 4x Daily PRN, Devan Riggins MD  •  LORazepam (ATIVAN) tablet 1 mg, 1 mg, Oral, Q2H PRN, 1 mg at 04/10/18 2031 **OR** LORazepam (ATIVAN) injection 1 mg, 1 mg, Intravenous, Q2H PRN **OR** LORazepam (ATIVAN) tablet 2 mg, 2 mg, Oral, Q1H PRN **OR** LORazepam (ATIVAN) injection 2 mg, 2 mg, Intravenous, Q1H PRN **OR** LORazepam (ATIVAN) injection 2 mg, 2 mg, Intravenous, Q15 Min PRN **OR** LORazepam (ATIVAN) injection 2 mg, 2 mg, Intramuscular, Q15  Min PRN, Devan Riggins MD  •  LORazepam (ATIVAN) tablet 0.5 mg, 0.5 mg, Oral, Q8H, Devan Riggins MD, 0.5 mg at 04/13/18 0643  •  magnesium hydroxide (MILK OF MAGNESIA) suspension 2400 mg/10mL 10 mL, 10 mL, Oral, Daily PRN, Devan Riggins MD, 10 mL at 04/10/18 2331  •  melatonin tablet 6 mg, 6 mg, Oral, Nightly, Devan Riggins MD, 6 mg at 04/12/18 2111  •  risperiDONE (risperDAL) tablet 0.5 mg, 0.5 mg, Oral, TID PRN, Devan Riggins MD  •  risperiDONE (risperDAL) tablet 1 mg, 1 mg, Oral, TID, Devan Riggins MD, 1 mg at 04/13/18 0844  •  THERA tablet 1 tablet, 1 tablet, Oral, Daily, Devan Riggins MD, 1 tablet at 04/13/18 0845  •  thiamine (VITAMIN B-1) tablet 100 mg, 100 mg, Oral, Daily, Devan Riggins MD, 100 mg at 04/13/18 0844  •  ziprasidone (GEODON) injection 20 mg, 20 mg, Intramuscular, Daily PRN, Devan Riggins MD, 20 mg at 04/10/18 2227    Diagnoses/Assessment:   Principal Problem:    Amphetamine-induced psychotic disorder with hallucinations  Active Problems:    Amphetamine use disorder, severe    Alcohol use disorder, severe, dependence      Treatment Plan:    1) Will continue care for the patient on the behavioral health unit at [facility] to ensure patient safety.  2) Will continue to provide treatment with the unit milieu, activities, therapies and psychopharmacological management.  3) Patient to be placed on or continued on  Q15 minute checks  and Elopement, Aggression and Fall precautions.  4) Pertinent medical issues:  Spasticity and complaints of back pain.  Will continue the baclofen 5mg tid.  Will decrease the neurontin to 300mg tid.  5) Will order following labs: none  6) Will make the following medication changes: Will continue risperdal 1mg tid.  Will stop the ativan.  Will continue the melatonin.  Will continue the MVA and thiamine.  7) Will continue discharge planning as appropriate for patient.  8) Psychotherapy provided: none    Treatment  plan and medication risks and benefits discussed with: Patient    Devan Riggins MD  04/13/18  12:02 PM

## 2018-04-13 NOTE — CONSULTS
Adult Nutrition  Assessment    Patient Name:  Jean Peñaloza  YOB: 1980  MRN: [id number]  Admit Date:  4/9/2018    Assessment Date:  4/13/2018    Comments:  Pt reports good appetite.  Voiced no food preferences.  Intake 100% - 4x, 75% - 1x, 0% - 2x plus 100% for 1 snack.  Meds and labs reviewed.          Adult Nutrition Assessment     Row Name 04/13/18 1410       PO Evaluation    Number of Meals 7   plus 1 snack    % PO Intake 100% - 4x, 75% - 1x, 0% - 2x    Row Name 04/13/18 1409       Physical Findings    Skin other (see comments)   right upper arm abscess       Nutrition Prescription PO    Current PO Diet Regular    Supplement Ensure Enlive    Supplement Frequency 3 times a day    Row Name 04/13/18 1404       Reason for Assessment    Reason For Assessment follow-up protocol       Labs/Procedures/Meds    Lab Results Reviewed reviewed          Electronically signed by:  Nataly Cruz RD  04/13/18 2:11 PM

## 2018-04-13 NOTE — PLAN OF CARE
Problem: Patient Care Overview  Goal: Plan of Care Review   04/12/18 4838   Coping/Psychosocial   Plan of Care Reviewed With patient   OTHER   Outcome Summary PT evaluation completed. Pt transferred sit to stand with CGA and ambulated 40 ft with CGA. Function limited primarily by decreased strength, balance, and tolerance for functional mobiltiy and activities. Pt will benefit from PT to improve transfer and gait ability and safety awareness. Anticipate home with family at discharge. Pt may benefit from outpatient PT for conditioning.

## 2018-04-14 PROBLEM — G89.4 CHRONIC PAIN ASSOCIATED WITH SIGNIFICANT PSYCHOSOCIAL DYSFUNCTION: Status: ACTIVE | Noted: 2018-04-14

## 2018-04-14 PROCEDURE — 99232 SBSQ HOSP IP/OBS MODERATE 35: CPT | Performed by: PSYCHIATRY & NEUROLOGY

## 2018-04-14 RX ORDER — DULOXETIN HYDROCHLORIDE 30 MG/1
30 CAPSULE, DELAYED RELEASE ORAL DAILY
Status: COMPLETED | OUTPATIENT
Start: 2018-04-14 | End: 2018-04-14

## 2018-04-14 RX ORDER — RISPERIDONE 1 MG/1
1 TABLET ORAL 2 TIMES DAILY
Status: DISCONTINUED | OUTPATIENT
Start: 2018-04-14 | End: 2018-04-15

## 2018-04-14 RX ORDER — DULOXETIN HYDROCHLORIDE 60 MG/1
60 CAPSULE, DELAYED RELEASE ORAL DAILY
Status: DISCONTINUED | OUTPATIENT
Start: 2018-04-15 | End: 2018-04-17 | Stop reason: HOSPADM

## 2018-04-14 RX ADMIN — Medication 5 MG: at 16:24

## 2018-04-14 RX ADMIN — MELATONIN TAB 3 MG 6 MG: 3 TAB at 20:21

## 2018-04-14 RX ADMIN — THERA TABS 1 TABLET: TAB at 09:40

## 2018-04-14 RX ADMIN — RISPERIDONE 1 MG: 1 TABLET ORAL at 09:42

## 2018-04-14 RX ADMIN — GABAPENTIN 300 MG: 300 CAPSULE ORAL at 20:21

## 2018-04-14 RX ADMIN — DULOXETINE 30 MG: 30 CAPSULE, DELAYED RELEASE ORAL at 12:13

## 2018-04-14 RX ADMIN — Medication 100 MG: at 09:41

## 2018-04-14 RX ADMIN — Medication 5 MG: at 20:21

## 2018-04-14 RX ADMIN — GABAPENTIN 300 MG: 300 CAPSULE ORAL at 09:41

## 2018-04-14 RX ADMIN — Medication 5 MG: at 09:41

## 2018-04-14 RX ADMIN — RISPERIDONE 1 MG: 1 TABLET ORAL at 20:21

## 2018-04-14 RX ADMIN — GABAPENTIN 300 MG: 300 CAPSULE ORAL at 16:24

## 2018-04-14 NOTE — PROGRESS NOTES
4/14/2018    Chief Complaint: psychosis    Subjective:  Patient is a 37 y.o. male who was hospitalized for psychosis and altered mental status.   Patient today is able to report that he is at the hospital in [place].  He understands that he was hospitalized due to his drug issues.  He notes that he previous was in denial about his drug use and did not seriously pursue rehab.  He notes that when he fell asleep and fell out of his deer stand he had been up all the night before doing drugs.  He lay on the ground for several hours if not the whole day before anyone found him.  He also notes that neurontin was an issue in getting into rehab.    Objective     Vital Signs    Temp:  [97.4 °F (36.3 °C)-97.5 °F (36.4 °C)] 97.5 °F (36.4 °C)  Heart Rate:  [] 110  Resp:  [18] 18  BP: (111-143)/(60-63) 111/60    Physical Exam:   General Appearance: alert, appears stated age and cooperative,  Hygiene:   good  Gait & Station: Wheelchair  Musculoskeletal: No tremors or abnormal involuntary movements    Mental Status Exam:   Cooperation:  Cooperative  Eye Contact:  Good  Psychomotor Behavior:  Appropriate  Mood: Improving  Affect:  normal  Speech:  Normal  Thought Process:  Coherent  Associations: Goal Directed  Thought Content:     Normal   Suicidal:  None   Homicidal:  None   Hallucinations:  None   Delusion:  None  Cognitive Functioning:   Consciousness: awake, alert and oriented  Reliability:  good  Insight:  Good  Judgement:  Fair  Impulse Control:  Good    Lab Results (last 24 hours)     ** No results found for the last 24 hours. **        Imaging Results (last 24 hours)     ** No results found for the last 24 hours. **          Medicine:   Current Facility-Administered Medications:   •  acetaminophen (TYLENOL) tablet 650 mg, 650 mg, Oral, Q4H PRN, Devan Riggins MD, 650 mg at 04/10/18 1209  •  baclofen (LIORESAL) half tablet 5 mg, 5 mg, Oral, TID, Devan Riggins MD, 5 mg at 04/14/18 0977  •  DULoxetine  (CYMBALTA) DR capsule 30 mg, 30 mg, Oral, Daily **FOLLOWED BY** [START ON 4/15/2018] DULoxetine (CYMBALTA) DR capsule 60 mg, 60 mg, Oral, Daily, Devan Riggins MD  •  gabapentin (NEURONTIN) capsule 300 mg, 300 mg, Oral, TID, Devan Riggins MD, 300 mg at 04/14/18 0941  •  magnesium hydroxide (MILK OF MAGNESIA) suspension 2400 mg/10mL 10 mL, 10 mL, Oral, Daily PRN, Devan Riggins MD, 10 mL at 04/10/18 2331  •  melatonin tablet 6 mg, 6 mg, Oral, Nightly, Devan Riggins MD, 6 mg at 04/13/18 2039  •  risperiDONE (risperDAL) tablet 0.5 mg, 0.5 mg, Oral, TID PRN, Devan Riggins MD  •  risperiDONE (risperDAL) tablet 1 mg, 1 mg, Oral, BID, Devan Riggins MD  •  THERA tablet 1 tablet, 1 tablet, Oral, Daily, Devan Riggins MD, 1 tablet at 04/14/18 0940  •  thiamine (VITAMIN B-1) tablet 100 mg, 100 mg, Oral, Daily, Devan Riggins MD, 100 mg at 04/14/18 0941  •  ziprasidone (GEODON) injection 20 mg, 20 mg, Intramuscular, Daily PRN, Devan Riggins MD, 20 mg at 04/10/18 2227    Diagnoses/Assessment:   Principal Problem:    Amphetamine-induced psychotic disorder with hallucinations  Active Problems:    Amphetamine use disorder, severe    Alcohol use disorder, severe, dependence    Chronic pain associated with significant psychosocial dysfunction      Treatment Plan:    1) Will continue care for the patient on the behavioral health unit at [facility] to ensure patient safety.  2) Will continue to provide treatment with the unit milieu, activities, therapies and psychopharmacological management.  3) Patient to be placed on or continued on  Q15 minute checks  and Elopement, Aggression and Fall precautions.  4) Pertinent medical issues: Spasticity and complaints of back pain.  Will continue the baclofen 5mg tid and neurontin at 300mg tid.  5) Will order following labs: none  6) Will make the following medication changes: Will decrease risperdal to 1mg bid.  Will continue the  melatonin.  Will continue the MVA and thiamine.  Will start cymbalta 30mg for nerve pain.  7) Will continue discharge planning as appropriate for patient.  8) Psychotherapy provided: none     Treatment plan and medication risks and benefits discussed with: Patient    Devan Riggins MD  04/14/18  10:49 AM

## 2018-04-14 NOTE — NURSING NOTE
Behavior   Anxiety: Feeling worried  Depression: depressed mood  Pain  1  AVH   no  S/I   no  H/I   no    Affect   calm and pleasant    Note: Patient interacting well with staff and peers. Pt has spent more time out of his room today. Patient states he slept well last night and denies hallucinations. Patient is fully alert and oriented x4. Patient voiced interest in drug rehab at time of discharge. Patient agrees to notify staff of any questions/concerns. Patient denies any needs at this time.      Intervention  Instructed in medication usage and effects  Medications administered as ordered  Encouraged to verbalize needs      Response  Verbalized understanding   Did patient take medications as ordered yes   Did patient interact with assessment?  yes     Plan  Will monitor for safety  Will monitor every 15 minutes as ordered  Will evaluate and promote the plan of care

## 2018-04-14 NOTE — NURSING NOTE
Behavior   Anxiety: Feeling worried  Depression: depressed mood  Pain  2  AVH   no  S/I   no  H/I   no    Affect   calm and pleasant    Note:      Intervention  Instructed in medication usage and effects  Medications administered as ordered  Encouraged to verbalize needs      Response  Verbalized understanding   Did patient take medications as ordered yes   Did patient interact with assessment?  yes     Plan  Will monitor for safety  Will monitor every 15 minutes as ordered  Will evaluate and promote the plan of care

## 2018-04-14 NOTE — NURSING NOTE
Behavior   Anxiety: Difficulty concentrating  Depression: anxiety  Pain  0  AVH   no  S/I   no  H/I   no    Affect   flat    Note:pt lying in bed attempting to sleep,  Awakened to name, stated is not seeing or hearing things at this time, stated is feeling much better with the anxiety and depression.  Stated has some , just not as bad as it had been. Pt reminded to notify staff of change in status.  Pt stated would.      Intervention  Instructed in medication usage and effects  Medications administered as ordered  Encouraged to verbalize needs      Response  Verbalized understanding   Did patient take medications as ordered yes   Did patient interact with assessment?  yes     Plan  Will monitor for safety  Will monitor every 15 minutes as ordered  Will evaluate and promote the plan of care

## 2018-04-14 NOTE — PLAN OF CARE
Problem: Patient Care Overview  Goal: Individualization and Mutuality  Outcome: Ongoing (interventions implemented as appropriate)    Goal: Discharge Needs Assessment  Outcome: Ongoing (interventions implemented as appropriate)    Goal: Interprofessional Rounds/Family Conf  Outcome: Ongoing (interventions implemented as appropriate)      Problem: Overarching Goals (Adult)  Goal: Adheres to Safety Considerations for Self and Others  Outcome: Ongoing (interventions implemented as appropriate)    Goal: Optimized Coping Skills in Response to Life Stressors  Outcome: Ongoing (interventions implemented as appropriate)    Goal: Develops/Participates in Therapeutic [place] to Support Successful Transition  Outcome: Ongoing (interventions implemented as appropriate)      Problem: Fall Risk (Adult)  Goal: Identify Related Risk Factors and Signs and Symptoms  Outcome: Ongoing (interventions implemented as appropriate)    Goal: Absence of Fall  Outcome: Ongoing (interventions implemented as appropriate)  No falls this shift    Problem: Skin Injury Risk (Adult)  Goal: Identify Related Risk Factors and Signs and Symptoms  Outcome: Ongoing (interventions implemented as appropriate)    Goal: Skin Health and Integrity  Outcome: Ongoing (interventions implemented as appropriate)

## 2018-04-14 NOTE — PLAN OF CARE
Problem: Patient Care Overview  Goal: Plan of Care Review  Outcome: Ongoing (interventions implemented as appropriate)      Problem: Overarching Goals (Adult)  Goal: Adheres to Safety Considerations for Self and Others  Outcome: Ongoing (interventions implemented as appropriate)    Goal: Optimized Coping Skills in Response to Life Stressors  Outcome: Ongoing (interventions implemented as appropriate)    Goal: Develops/Participates in Therapeutic [place] to Support Successful Transition  Outcome: Ongoing (interventions implemented as appropriate)      Problem: Fall Risk (Adult)  Goal: Identify Related Risk Factors and Signs and Symptoms  Outcome: Ongoing (interventions implemented as appropriate)      Problem: Physiological Impairment (Excessive Substance Use) (Adult)  Goal: Improved Physiologic Symptoms (Excessive Substance Use)  Outcome: Ongoing (interventions implemented as appropriate)      Problem: Skin Injury Risk (Adult)  Goal: Identify Related Risk Factors and Signs and Symptoms  Outcome: Ongoing (interventions implemented as appropriate)  Skin integrity maintained  Goal: Skin Health and Integrity  Outcome: Ongoing (interventions implemented as appropriate)

## 2018-04-14 NOTE — PLAN OF CARE
Problem: Patient Care Overview  Goal: Plan of Care Review  Outcome: Ongoing (interventions implemented as appropriate)      Problem: Overarching Goals (Adult)  Goal: Adheres to Safety Considerations for Self and Others  Outcome: Ongoing (interventions implemented as appropriate)    Goal: Optimized Coping Skills in Response to Life Stressors  Outcome: Ongoing (interventions implemented as appropriate)    Goal: Develops/Participates in Therapeutic [place] to Support Successful Transition  Outcome: Ongoing (interventions implemented as appropriate)      Problem: Psychomotor Movement Impairment (Psychotic Signs/Symptoms) (Adult)  Goal: Improved Psychomotor Symptoms (Psychotic Signs/Symptoms)  Outcome: Ongoing (interventions implemented as appropriate)      Problem: Fall Risk (Adult)  Goal: Identify Related Risk Factors and Signs and Symptoms  Outcome: Ongoing (interventions implemented as appropriate)    Goal: Absence of Fall  Outcome: Ongoing (interventions implemented as appropriate)      Problem: Physiological Impairment (Excessive Substance Use) (Adult)  Goal: Improved Physiologic Symptoms (Excessive Substance Use)  Outcome: Ongoing (interventions implemented as appropriate)      Problem: Skin Injury Risk (Adult)  Goal: Identify Related Risk Factors and Signs and Symptoms  Outcome: Ongoing (interventions implemented as appropriate)    Goal: Skin Health and Integrity  Outcome: Ongoing (interventions implemented as appropriate)

## 2018-04-15 PROCEDURE — 99232 SBSQ HOSP IP/OBS MODERATE 35: CPT | Performed by: PSYCHIATRY & NEUROLOGY

## 2018-04-15 PROCEDURE — 97110 THERAPEUTIC EXERCISES: CPT

## 2018-04-15 PROCEDURE — 97116 GAIT TRAINING THERAPY: CPT

## 2018-04-15 RX ORDER — RISPERIDONE 0.5 MG/1
0.5 TABLET ORAL 2 TIMES DAILY
Status: DISCONTINUED | OUTPATIENT
Start: 2018-04-15 | End: 2018-04-16

## 2018-04-15 RX ORDER — GABAPENTIN 100 MG/1
200 CAPSULE ORAL 3 TIMES DAILY
Status: DISCONTINUED | OUTPATIENT
Start: 2018-04-15 | End: 2018-04-16

## 2018-04-15 RX ADMIN — THERA TABS 1 TABLET: TAB at 08:01

## 2018-04-15 RX ADMIN — GABAPENTIN 200 MG: 100 CAPSULE ORAL at 20:18

## 2018-04-15 RX ADMIN — GABAPENTIN 300 MG: 300 CAPSULE ORAL at 08:01

## 2018-04-15 RX ADMIN — MELATONIN TAB 3 MG 6 MG: 3 TAB at 20:17

## 2018-04-15 RX ADMIN — RISPERIDONE 1 MG: 1 TABLET ORAL at 08:02

## 2018-04-15 RX ADMIN — Medication 100 MG: at 08:01

## 2018-04-15 RX ADMIN — Medication 5 MG: at 08:01

## 2018-04-15 RX ADMIN — DULOXETINE 60 MG: 60 CAPSULE, DELAYED RELEASE ORAL at 08:01

## 2018-04-15 RX ADMIN — GABAPENTIN 200 MG: 100 CAPSULE ORAL at 16:18

## 2018-04-15 RX ADMIN — RISPERIDONE 0.5 MG: 0.5 TABLET ORAL at 20:17

## 2018-04-15 RX ADMIN — Medication 5 MG: at 16:18

## 2018-04-15 RX ADMIN — ACETAMINOPHEN 650 MG: 325 TABLET ORAL at 08:01

## 2018-04-15 RX ADMIN — Medication 5 MG: at 20:17

## 2018-04-15 NOTE — THERAPY TREATMENT NOTE
Acute Care - Physical Therapy Treatment Note  [facility]     Patient Name: Jean Peñaloza  : 1980  MRN: [id number]  Today's Date: 4/15/2018  Onset of Illness/Injury or Date of Surgery: 18  Date of Referral to PT: 18  Referring Physician: Dr. Kelvin Mcqueen    Admit Date: 2018    Visit Dx:    ICD-10-CM ICD-9-CM   1. Impaired functional mobility, balance, gait, and endurance Z74.09 V49.89     Patient Active Problem List   Diagnosis   • Anxiety   • Depression   • Laceration of thumb, right   • Dependence on wheelchair   • Brown-Sequard syndrome   • Cervical pain (neck)   • Hemiparaplegic syndrome   • Muscle spasm   • Sternum pain   • Arthralgia   • Decreased appetite   • Wound cellulitis   • Muscle pain   • Acute pain of right shoulder   • Closed fracture of head of right humerus   • Tendonosis   • Nondisplaced fracture of greater tuberosity of right humerus with routine healing   • Fall   • Amphetamine-induced psychotic disorder with hallucinations   • Amphetamine use disorder, severe   • Alcohol use disorder, severe, dependence   • Chronic pain associated with significant psychosocial dysfunction       Therapy Treatment          Rehabilitation Treatment Summary     Row Name 04/15/18 1530             Treatment Time/Intention    Discipline physical therapy assistant  -JA      Document Type therapy note (daily note)  -JA      Subjective Information no complaints  -JA      Mode of Treatment physical therapy  -JA      Total Minutes, Physical Therapy Treatment 40  -JA      Therapy Frequency (PT Clinical Impression) other (see comments)   5-14xwk  -JA      Patient Effort excellent  -JA      Existing Precautions/Restrictions fall;seizures  -JA      Recorded by [LETICIA] Jean-Paul Rizo, PTA 04/15/18 1625 04/15/18 1625      Row Name 04/15/18 1530             Vital Signs    Pre Systolic BP Rehab 127  -JA      Pre Treatment Diastolic BP 74  -JA      Post Systolic BP Rehab 119  -JA      Post  Treatment Diastolic BP 76  -JA      Pretreatment Heart Rate (beats/min) 104  -JA      Posttreatment Heart Rate (beats/min) 112  -JA      Recorded by [JA] Jean-Paul Rizo, PTA 04/15/18 1625 04/15/18 1625      Row Name 04/15/18 1530             Cognitive Assessment/Intervention- PT/OT    Affect/Mental Status (Cognitive) WFL  -JA      Recorded by [JA] Jean-Paul Rizo, PTA 04/15/18 1625 04/15/18 1625      Row Name 04/15/18 1530             Bed Mobility Assessment/Treatment    Sit-Supine [place] (Bed Mobility) independent  -JA      Supine-Sit-Supine [place] (Bed Mobility) independent  -JA      Comment (Bed Mobility) HOB down no bedrails  -JA      Recorded by [JA] Jean-Paul Rizo, PTA 04/15/18 1625 04/15/18 1625      Row Name 04/15/18 1530             Transfer Assessment/Treatment    Sit-Stand [place] (Transfers) independent  -JA      Stand-Sit [place] (Transfers) independent  -JA      Recorded by [JA] JeanP-aul Rizo, [facility] 04/15/18 1625 04/15/18 1625      Row Name 04/15/18 1530             Gait/Stairs Assessment/Training    [place] Level (Gait) supervision;contact guard  -JA      Assistive Device (Gait) cane, straight  -JA      Distance in Feet (Gait) 150  -JA      Deviations/Abnormal Patterns (Gait) ataxic;right sided deviations  -JA      Left Sided Gait Deviations hip circumduction;heel strike decreased;foot drop/toe drag;weight shift ability decreased   v.c.'s to slow adán & focus on quality   -JA      Comment (Gait/Stairs) Pt. cont.'s with poor gt. quality at R & increased with fatigue   -JA      Recorded by [JA] Jean-Paul Rizo, PTA 04/15/18 1625 04/15/18 1625      Row Name 04/15/18 1530             Therapeutic Exercise    Lower Extremity (Therapeutic Exercise) marching while seated;LAQ (long arc quad), bilateral;SLR (straight leg raise), bilateral   supine Hip A/A  -JA      Exercise Type (Therapeutic Exercise) AROM (active range of motion)  -JA      Position (Therapeutic  Exercise) seated;supine  -JA      Sets/Reps (Therapeutic Exercise) x15  -JA      Recorded by [JA] Jean-Paul Rizo, JULIETTE 04/15/18 1625 04/15/18 1625      Row Name 04/15/18 1530             Positioning and Restraints    Pre-Treatment Position sitting in chair/recliner  -JA      Post Treatment Position bed  -JA      In Bed supine;call light within reach;encouraged to call for assist;patient within staff view  -JA      Recorded by [JA] Jean-Paul Rizo PTA 04/15/18 1625 04/15/18 1625      Row Name 04/15/18 1530             Pain Scale: Numbers Pre/Post-Treatment    Pain Scale: Numbers, Pretreatment 0/10 - no pain  -JA      Pain Scale: Numbers, Post-Treatment 0/10 - no pain  -JA      Recorded by [LETICIA] Jean-Paul Rizo, JULIETTE 04/15/18 1625 04/15/18 1625      Row Name                Wound 04/09/18 1900 Right upper arm abscess    Wound - Properties Group Date first assessed: 04/09/18 [DP] Time first assessed: 1900 [DP] Present On Admission : yes [DP] Side: Right [DP] Orientation: upper [DP] Location: arm [DP] Type: abscess [DP] Recorded by:  [DP] Griselda Lim RN 04/09/18 2157 04/09/18 2157    Row Name 04/15/18 1530             Outcome Summary/Treatment Plan (PT)    Daily Summary of Progress (PT) progress toward functional goals is good  -      Plan for Continued Treatment (PT) Cont. gt with st. cane( st. cane behind nsg. desk per nsg. request) st. cane needs to return to ARU  -      Anticipated Discharge Disposition (PT) home or self care  -JA      Recorded by [LETICIA] Jean-Paul Rizo PTA 04/15/18 1625 04/15/18 1625        User Key  (r) = Recorded By, (t) = Taken By, (c) = Cosigned By    Initials Name Effective Dates Discipline    DP Griselda Lim RN 04/06/18 -  Nurse    LETICIA Rizo PTA 03/07/18 -  PT                       PT Rehab Goals     Row Name 04/15/18 1530             Transfer Goal 1 (PT)    Activity/Assistive Device (Transfer Goal 1, PT)  sit-to-stand/stand-to-sit;bed-to-chair/chair-to-bed;wheelchair transfer  -JA      [place] Level/Cues Needed (Transfer Goal 1, PT) independent;conditional independence  -JA      Time Frame (Transfer Goal 1, PT) by discharge  -JA      Progress/Outcome (Transfer Goal 1, PT) goal partially met  -JA         Gait Training Goal 1 (PT)    Activity/Assistive Device (Gait Training Goal 1, PT) gait (walking locomotion)  -JA      [place] Level (Gait Training Goal 1, PT) independent;conditional independence  -JA      Distance (Gait Goal 1, PT) 150ft  -JA      Time Frame (Gait Training Goal 1, PT) by discharge  -JA      Progress/Outcome (Gait Training Goal 1, PT) goal not met  -JA         Strength Goal 1 (PT)    Strength Goal 1 (PT) Pt will tolerate 2 sets of 15 reps AAROM/AROM exercises with VSS  -JA      Time Frame (Strength Goal 1, PT) by discharge  -JA      Progress/Outcome (Strength Goal 1, PT) goal not met  -JA        User Key  (r) = Recorded By, (t) = Taken By, (c) = Cosigned By    Initials Name Provider Type Discipline    LETICIA Rizo, PTA Physical Therapy Assistant PT          Physical Therapy Education     Title: PT OT SLP Therapies (Active)     Topic: Physical Therapy (Active)     Point: Mobility training (Active)    Learning Progress Summary     Learner Status Readiness Method Response Comment Documented by    Patient Active Acceptance E NR   04/12/18 1934          Point: Precautions (Active)    Learning Progress Summary     Learner Status Readiness Method Response Comment Documented by    Patient Active Acceptance E NR   04/12/18 1934                      User Key     Initials Effective Dates Name Provider Type Discipline     04/03/18 -  Tabitha Reardon, PT Physical Therapist PT                    PT Recommendation and Plan  Anticipated Discharge Disposition (PT): home or self care  Therapy Frequency (PT Clinical Impression): other (see comments) (5-14xwk)  Outcome Summary/Treatment Plan  (PT)  Daily Summary of Progress (PT): progress toward functional goals is good  Plan for Continued Treatment (PT): Cont. gt with st. cane( st. cane behind nsg. desk per nsg. request) st. cane needs to return to ARU  Anticipated Discharge Disposition (PT): home or self care  Plan of Care Reviewed With: patient  Progress: improving  Outcome Summary: Pt. with improved gt. tolerance & improved balance with use of st. cane this p.m. Pt. cont.'s with poor quality at R due to weakness & decrease control at this time.           Outcome Measures     Row Name 04/15/18 1530 04/13/18 1305 04/12/18 1730       How much help from another person do you currently need...    Turning from your back to your side while in flat bed without using bedrails? 4  -JA 4  -TW 4  -LIONEL    Moving from lying on back to sitting on the side of a flat bed without bedrails? 4  -JA 4  -TW 4  -LIONEL    Moving to and from a bed to a chair (including a wheelchair)? 4  - 3  -TW 3  -LIONEL    Standing up from a chair using your arms (e.g., wheelchair, bedside chair)? 4  - 3  -TW 3  -LIONEL    Climbing 3-5 steps with a railing? 3  -JA 2  -TW 2  -LIONEL    To walk in hospital room? 3  - 3  -TW 3  -LIONEL    AM-PAC 6 Clicks Score 22  -JA 19  -TW 19  -LIONEL       Functional Assessment    Outcome Measure Options AM-PAC 6 Clicks Basic Mobility (PT)  - AM-PAC 6 Clicks Basic Mobility (PT)  -TW AM-PAC 6 Clicks Basic Mobility (PT)  -      User Key  (r) = Recorded By, (t) = Taken By, (c) = Cosigned By    Initials Name Provider Type    LIONEL Reardon, PT Physical Therapist    LETICIA Rizo, PTA Physical Therapy Assistant    LELO Mercado PTA Physical Therapy Assistant           Time Calculation:         PT Charges     Row Name 04/15/18 1629             Time Calculation    Start Time 1530  -JA      Stop Time 1610  -JA      Time Calculation (min) 40 min  -LETICIA         Time Calculation- PT    Total Timed Code Minutes- PT 40 minute(s)  -        User Key  (r) =  Recorded By, (t) = Taken By, (c) = Cosigned By    Initials Name Provider Type    JA Jean-Paul Rizo PTA Physical Therapy Assistant          Therapy Charges for Today     Code Description Service Date Service Provider Modifiers Qty    32079200073 HC GAIT TRAINING EA 15 MIN 4/15/2018 Jean-Paul Rizo PTA GP 1    43121034952 HC PT THER PROC EA 15 MIN 4/15/2018 Jean-Paul Rizo PTA GP 2          PT G-Codes  PT Professional Judgement Used?: Yes  Outcome Measure Options: AM-PAC 6 Clicks Basic Mobility (PT)  Score: 19  Functional Limitation: Mobility: Walking and moving around  Mobility: Walking and Moving Around Current Status (): At least 20 percent but less than 40 percent impaired, limited or restricted  Mobility: Walking and Moving Around Goal Status (): At least 1 percent but less than 20 percent impaired, limited or restricted    Jean-Paul Rizo PTA  4/15/2018

## 2018-04-15 NOTE — PLAN OF CARE
Problem: Patient Care Overview  Goal: Plan of Care Review  Outcome: Ongoing (interventions implemented as appropriate)   04/15/18 1530   Coping/Psychosocial   Plan of Care Reviewed With patient   Plan of Care Review   Progress improving   OTHER   Outcome Summary Pt. with improved gt. tolerance & improved balance with use of st. cane this p.m. Pt. cont.'s with poor quality at R due to weakness & decrease control at this time.    Coping/Psychosocial   Patient Agreement with Plan of Care agrees     Goal: Discharge Needs Assessment  Outcome: Ongoing (interventions implemented as appropriate)   04/11/18 0919   Discharge Needs Assessment   Readmission Within the Last 30 Days no previous admission in last 30 days   Concerns to be Addressed discharge planning   Patient/Family Anticipates Transition to home with family   Patient/Family Anticipated Services at Transition outpatient care   Transportation Concerns car, none   Transportation Anticipated car, drives self   Offered/Gave Vendor List no

## 2018-04-15 NOTE — PLAN OF CARE
Problem: Patient Care Overview  Goal: Plan of Care Review  Outcome: Ongoing (interventions implemented as appropriate)    Goal: Individualization and Mutuality  Outcome: Ongoing (interventions implemented as appropriate)    Goal: Discharge Needs Assessment  Outcome: Ongoing (interventions implemented as appropriate)    Goal: Interprofessional Rounds/Family Conf  Outcome: Ongoing (interventions implemented as appropriate)      Problem: Overarching Goals (Adult)  Goal: Adheres to Safety Considerations for Self and Others  Outcome: Ongoing (interventions implemented as appropriate)    Goal: Optimized Coping Skills in Response to Life Stressors  Outcome: Ongoing (interventions implemented as appropriate)    Goal: Develops/Participates in Therapeutic [place] to Support Successful Transition  Outcome: Ongoing (interventions implemented as appropriate)      Problem: Fall Risk (Adult)  Goal: Identify Related Risk Factors and Signs and Symptoms  Outcome: Ongoing (interventions implemented as appropriate)    Goal: Absence of Fall  Outcome: Ongoing (interventions implemented as appropriate)  No falls this shift    Problem: Skin Injury Risk (Adult)  Goal: Identify Related Risk Factors and Signs and Symptoms  Outcome: Ongoing (interventions implemented as appropriate)    Goal: Skin Health and Integrity  Outcome: Ongoing (interventions implemented as appropriate)  No skin issues this shift
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
## 2018-04-15 NOTE — NURSING NOTE
Behavior   Anxiety: Sleep disturbance  Depression: depressed mood  Pain  0  AVH   no  S/I   no  H/I   no    Affect   flat    Note:pt sitting in wheelchair with peers , some interaction noted, pt stated visit with wife went well, stated is ready to go home/rehab.denies SI or HI stated some depression/anxiety but is getting better.      Intervention  Instructed in medication usage and effects  Medications administered as ordered  Encouraged to verbalize needs      Response  Verbalized understanding   Did patient take medications as ordered yes   Did patient interact with assessment?  yes     Plan  Will monitor for safety  Will monitor every 15 minutes as ordered  Will evaluate and promote the plan of care

## 2018-04-15 NOTE — NURSING NOTE
"Behavior   Anxiety: denies  Depression: denies  Pain  4  AVH   no  S/I   no  H/I   no    Affect   calm and pleasant    Note: Patient stated \" I feel good today, better everyday\" \" I am going to start calling places to see if someone will take me with me being in a w/c, he is working on the medicine changes\". Patient verbalized right shoulder pain and requested tylenol. Patient in bed and reveals he wants to get more rest.      Intervention  Instructed in medication usage and effects  Medications administered as ordered  Encouraged to verbalize needs      Response  Verbalized understanding   Did patient take medications as ordered yes   Did patient interact with assessment?  yes     Plan  Will monitor for safety  Will monitor every 15 minutes as ordered  Will evaluate and promote the plan of care  "

## 2018-04-16 PROCEDURE — 97116 GAIT TRAINING THERAPY: CPT

## 2018-04-16 PROCEDURE — 99232 SBSQ HOSP IP/OBS MODERATE 35: CPT | Performed by: PSYCHIATRY & NEUROLOGY

## 2018-04-16 RX ORDER — GABAPENTIN 300 MG/1
300 CAPSULE ORAL 3 TIMES DAILY
Status: DISCONTINUED | OUTPATIENT
Start: 2018-04-16 | End: 2018-04-17 | Stop reason: HOSPADM

## 2018-04-16 RX ADMIN — DULOXETINE 60 MG: 60 CAPSULE, DELAYED RELEASE ORAL at 08:22

## 2018-04-16 RX ADMIN — Medication 5 MG: at 20:35

## 2018-04-16 RX ADMIN — GABAPENTIN 200 MG: 100 CAPSULE ORAL at 08:22

## 2018-04-16 RX ADMIN — Medication 5 MG: at 08:22

## 2018-04-16 RX ADMIN — Medication 100 MG: at 08:23

## 2018-04-16 RX ADMIN — THERA TABS 1 TABLET: TAB at 08:22

## 2018-04-16 RX ADMIN — Medication 5 MG: at 16:04

## 2018-04-16 RX ADMIN — RISPERIDONE 0.5 MG: 0.5 TABLET ORAL at 08:23

## 2018-04-16 RX ADMIN — GABAPENTIN 200 MG: 100 CAPSULE ORAL at 16:04

## 2018-04-16 RX ADMIN — GABAPENTIN 300 MG: 300 CAPSULE ORAL at 20:35

## 2018-04-16 RX ADMIN — MELATONIN TAB 3 MG 6 MG: 3 TAB at 20:35

## 2018-04-16 NOTE — NURSING NOTE
"Behavior   Anxiety: none present  Depression: disturbed sleep  Pain   3  AVH   no  S/I   no  H/I   no  Affect   calm and pleasant    Patient stayed in his room all evening. Patient was pleasant and smiling throughout our conversation. He denies any AVH. Patient states he \"got a bad batch of dope\". Spoke with patient about his condition during the first few days of his stay, he does not recall any of it. Patient expresses gratitude for having returned to his original state and reports that he will \"never do that shit again\". Needs met at this time, will continue to monitor.    Intervention  Medications reviewed and administered  Assessment complete    Response  Verbalized understanding   Took medications  Interacted with assessment    Plan  Will promote and reinforce current treatment plan and encourage involvement in care plan goals.   Will provide for safe, calm, quiet environment.  Will promote open communication with staff and foster a trusting/working relationship with patient.   Will promote participation in groups and therapies and independent reflection.        "

## 2018-04-16 NOTE — SIGNIFICANT NOTE
"   04/16/18 1110   Individual Counseling   Length of Session 20   Topic Progress   Patient Response CSW met with pt 1:1 to discuss progress towards tx goals. Pt presents with good mood, affect is neutral. Pt appears to be much more clear and motivated today. Pt not responding and makes no delusional statements. CSW inquired about pts weekend. Pt reports having a good weekend, says he spoke with MD about rehab options. Pt appears to be motivated to go to rehab, says \"I want to get this ball rolling.\" CSW provided pt with phone number for [facility] to which he called immediately and is currently in the process of completing phone assessment for. Pt has been attending groups and interacting well with other peers and staff. CSW to follow up pending assessment status.      "

## 2018-04-16 NOTE — THERAPY TREATMENT NOTE
"Acute Care - Physical Therapy Treatment Note  [facility]     Patient Name: Jean Peñaloza  : 1980  MRN: [id number]  Today's Date: 2018  Onset of Illness/Injury or Date of Surgery: 18  Date of Referral to PT: 18  Referring Physician: Dr. Kelvin Mcqueen    Admit Date: 2018    Visit Dx:    ICD-10-CM ICD-9-CM   1. Impaired functional mobility, balance, gait, and endurance Z74.09 V49.89     Patient Active Problem List   Diagnosis   • Anxiety   • Depression   • Laceration of thumb, right   • Dependence on wheelchair   • Brown-Sequard syndrome   • Cervical pain (neck)   • Hemiparaplegic syndrome   • Muscle spasm   • Sternum pain   • Arthralgia   • Decreased appetite   • Wound cellulitis   • Muscle pain   • Acute pain of right shoulder   • Closed fracture of head of right humerus   • Tendonosis   • Nondisplaced fracture of greater tuberosity of right humerus with routine healing   • Fall   • Amphetamine-induced psychotic disorder with hallucinations   • Amphetamine use disorder, severe   • Alcohol use disorder, severe, dependence   • Chronic pain associated with significant psychosocial dysfunction       Therapy Treatment          Rehabilitation Treatment Summary     Row Name 18 0930             Treatment Time/Intention    Discipline physical therapy assistant  -LETICIA      Document Type therapy note (daily note)  -LETICIA      Subjective Information complains of;pain   \" I just don't feel good today\"   -LETICIA      Mode of Treatment physical therapy  -JA      Total Minutes, Physical Therapy Treatment 20  -JA      Therapy Frequency (PT Clinical Impression) other (see comments)   5-14xwk  -JA      Patient Effort fair  -JA      Existing Precautions/Restrictions fall;seizures  -JA      Treatment Considerations/Comments Pt. pre-med prior to treatment   -LETICIA      Recorded by [LETICIA] Jean-Paul Rizo, JULIETTE 18 1122 18 1122      Row Name 18 0930             Vital Signs    Pre Systolic BP " Rehab 117  -JA      Pre Treatment Diastolic BP 75  -JA      Pretreatment Heart Rate (beats/min) 104  -JA      Pre Patient Position Supine  -JA      Intra Patient Position Standing  -JA      Post Patient Position Supine  -JA      Recorded by [JA] Jean-Paul Rizo, PTA 04/16/18 1122 04/16/18 1122      Row Name 04/16/18 0930             Cognitive Assessment/Intervention- PT/OT    Affect/Mental Status (Cognitive) WFL  -JA      Recorded by [JA] Jean-Paul Rizo, PTA 04/16/18 1122 04/16/18 1122      Row Name 04/16/18 0930             Bed Mobility Assessment/Treatment    Sit-Supine [place] (Bed Mobility) independent  -JA      Supine-Sit-Supine [place] (Bed Mobility) independent  -JA      Recorded by [JA] Jean-Paul Rizo, PTA 04/16/18 1126 04/16/18 1126      Row Name 04/16/18 0930             Transfer Assessment/Treatment    Bed-Chair [place] (Transfers) independent  -JA      Chair-Bed [place] (Transfers) independent  -JA      Assistive Device (Bed-Chair Transfers) other (see comments)   no AD st. pivot  -JA      Sit-Stand [place] (Transfers) independent  -JA      Stand-Sit [place] (Transfers) independent  -JA      Recorded by [JA] Jean-Paul Rizo, PTA 04/16/18 1126 04/16/18 1126      Row Name 04/16/18 0930             Chair-Bed Transfer    Assistive Device (Chair-Bed Transfers) other (see comments)   no AD   -JA      Recorded by [JA] Jean-Paul Rizo, PTA 04/16/18 1126 04/16/18 1126      Row Name 04/16/18 0930             Wheelchair Transfer    Type (Wheelchair Transfer) stand pivot/stand step  -JA      [place] Level (Wheelchair Transfer) independent  -JA      Recorded by [JA] Jean-Paul Rizo, PTA 04/16/18 1122 04/16/18 1122      Row Name 04/16/18 0930             Gait/Stairs Assessment/Training    [place] Level (Gait) supervision  -JA      Assistive Device (Gait) cane, straight  -JA      Distance in Feet (Gait) 150  -JA      Deviations/Abnormal Patterns (Gait)  ataxic;right sided deviations  -JA      Right Sided Gait Deviations hip circumduction;foot drop/toe drag;heel strike decreased;weight shift ability decreased  -JA      Comment (Gait/Stairs) Discussed with pt. regarding possible benefit from AFO at R for improved toe clearance & to decrease compensation at hip, but pt. reports that at previous stay at neuro rehab facility use of AFO discouraged by staff & pt. deferred at this time.  -JA      Recorded by [JA] Jean-Paul Rizo, JULIETTE 04/16/18 1122 04/16/18 1122      Row Name 04/16/18 0930             Therapeutic Exercise    Comment (Therapeutic Exercise) Pt. deferred due to pain  -JA      Recorded by [JA] Jean-Paul Rizo, JULIETTE 04/16/18 1122 04/16/18 1122      Row Name 04/16/18 0930             Positioning and Restraints    Pre-Treatment Position in bed  -JA      Post Treatment Position bed  -JA      In Bed supine;call light within reach;encouraged to call for assist;legs elevated  -JA      Recorded by [JA] Jean-Paul Rizo, JULIETTE 04/16/18 1122 04/16/18 1122      Row Name 04/16/18 0930             Pain Scale: Numbers Pre/Post-Treatment    Pain Scale: Numbers, Pretreatment 5/10  -JA      Pain Scale: Numbers, Post-Treatment 6/10  -JA      Pain Location - Side Bilateral  -JA      Pain Location other (see comments)  -JA      Pain Intervention(s) --   legs  -JA      Recorded by [JA] Jean-Paul Rizo, JULIETTE 04/16/18 1122 04/16/18 1122      Row Name                Wound 04/09/18 1900 Right upper arm abscess    Wound - Properties Group Date first assessed: 04/09/18 [DP] Time first assessed: 1900 [DP] Present On Admission : yes [DP] Side: Right [DP] Orientation: upper [DP] Location: arm [DP] Type: abscess [DP] Recorded by:  [DP] Griselda Lim RN 04/09/18 2157 04/09/18 2157    Row Name 04/16/18 0930             Outcome Summary/Treatment Plan (PT)    Daily Summary of Progress (PT) progress toward functional goals is good  -JA      Barriers to Overall Progress (PT) Pain; muscle  tone & strength in R LE  -JA      Plan for Continued Treatment (PT) Cont. gt with st. cane & R LE neuro therex   -JA      Anticipated Equipment Needs at Discharge (PT) standard cane  -JA      Anticipated Discharge Disposition (PT) home or self care  -JA      Recorded by [JA] Jean-Paul Rizo, PTA 04/16/18 1122 04/16/18 1122        User Key  (r) = Recorded By, (t) = Taken By, (c) = Cosigned By    Initials Name Effective Dates Discipline    DP Griselda Lim RN 04/06/18 -  Nurse    LETICIA Rizo, PTA 03/07/18 -  PT                       PT Rehab Goals     Row Name 04/16/18 0930             Transfer Goal 1 (PT)    Activity/Assistive Device (Transfer Goal 1, PT) sit-to-stand/stand-to-sit;bed-to-chair/chair-to-bed;wheelchair transfer  -JA      [place] Level/Cues Needed (Transfer Goal 1, PT) independent;conditional independence  -JA      Time Frame (Transfer Goal 1, PT) by discharge  -JA      Progress/Outcome (Transfer Goal 1, PT) goal met  -JA         Gait Training Goal 1 (PT)    Activity/Assistive Device (Gait Training Goal 1, PT) gait (walking locomotion)  -JA      [place] Level (Gait Training Goal 1, PT) independent;conditional independence  -JA      Distance (Gait Goal 1, PT) 150ft  -JA      Time Frame (Gait Training Goal 1, PT) by discharge  -JA      Progress/Outcome (Gait Training Goal 1, PT) goal not met  -JA         Strength Goal 1 (PT)    Strength Goal 1 (PT) Pt will tolerate 2 sets of 15 reps AAROM/AROM exercises with VSS  -JA      Time Frame (Strength Goal 1, PT) by discharge  -JA      Progress/Outcome (Strength Goal 1, PT) goal not met  -JA        User Key  (r) = Recorded By, (t) = Taken By, (c) = Cosigned By    Initials Name Provider Type Discipline    LETICIA Rizo PTA Physical Therapy Assistant PT          Physical Therapy Education     Title: PT OT SLP Therapies (Active)     Topic: Physical Therapy (Active)     Point: Mobility training (Active)    Learning Progress Summary      Learner Status Readiness Method Response Comment Documented by    Patient Active Acceptance E NR   04/12/18 1934          Point: Precautions (Active)    Learning Progress Summary     Learner Status Readiness Method Response Comment Documented by    Patient Active Acceptance E NR   04/12/18 1934                      User Key     Initials Effective Dates Name Provider Type Discipline     04/03/18 -  Tabitha Reardon, PT Physical Therapist PT                    PT Recommendation and Plan  Anticipated Discharge Disposition (PT): home or self care  Therapy Frequency (PT Clinical Impression): other (see comments) (5-14xwk)  Outcome Summary/Treatment Plan (PT)  Daily Summary of Progress (PT): progress toward functional goals is good  Barriers to Overall Progress (PT): Pain; muscle tone & strength in R LE  Plan for Continued Treatment (PT): Cont. gt with st. cane & R LE neuro therex   Anticipated Equipment Needs at Discharge (PT): standard cane  Anticipated Discharge Disposition (PT): home or self care  Plan of Care Reviewed With: patient  Progress: no change  Outcome Summary: Pt. deferred cont..d treatment after transfers & gt. due to B LE pain & discomfort at this time. Pt. met transfer goal this a.m. Cont. gt & LE strengthening           Outcome Measures     Row Name 04/16/18 0930 04/15/18 1530 04/13/18 1305       How much help from another person do you currently need...    Turning from your back to your side while in flat bed without using bedrails? 4  -JA 4  -JA 4  -TW    Moving from lying on back to sitting on the side of a flat bed without bedrails? 4  -JA 4  -JA 4  -TW    Moving to and from a bed to a chair (including a wheelchair)? 4  -JA 4  -JA 3  -TW    Standing up from a chair using your arms (e.g., wheelchair, bedside chair)? 4  -JA 4  -JA 3  -TW    Climbing 3-5 steps with a railing? 3  -JA 3  -JA 2  -TW    To walk in hospital room? 3  -JA 3  -JA 3  -TW    AM-PAC 6 Clicks Score 22  -JA 22  -JA 19  -TW        Functional Assessment    Outcome Measure Options AM-PAC 6 Clicks Basic Mobility (PT)  -JA AM-PAC 6 Clicks Basic Mobility (PT)  -JA AM-PAC 6 Clicks Basic Mobility (PT)  -TW      User Key  (r) = Recorded By, (t) = Taken By, (c) = Cosigned By    Initials Name Provider Type    LETICIA Rizo PTA Physical Therapy Assistant    LELO Mercado PTA Physical Therapy Assistant           Time Calculation:         PT Charges     Row Name 04/16/18 1128             Time Calculation    Start Time 0930  -      Stop Time 0950  -LETICIA      Time Calculation (min) 20 min  -         Time Calculation- PT    Total Timed Code Minutes- PT 20 minute(s)  -LETICIA        User Key  (r) = Recorded By, (t) = Taken By, (c) = Cosigned By    Initials Name Provider Type    LETICIA Rizo PTA Physical Therapy Assistant          Therapy Charges for Today     Code Description Service Date Service Provider Modifiers Qty    35039269843 HC GAIT TRAINING EA 15 MIN 4/15/2018 Jean-Paul Rizo, JULIETTE GP 1    35172990214 HC PT THER PROC EA 15 MIN 4/15/2018 Jean-Paul Rizo PTA GP 2    65095033245 HC GAIT TRAINING EA 15 MIN 4/16/2018 Jean-Paul Rizo, JULIETTE GP 1          PT G-Codes  PT Professional Judgement Used?: Yes  Outcome Measure Options: AM-PAC 6 Clicks Basic Mobility (PT)  Score: 19  Functional Limitation: Mobility: Walking and moving around  Mobility: Walking and Moving Around Current Status (): At least 20 percent but less than 40 percent impaired, limited or restricted  Mobility: Walking and Moving Around Goal Status (): At least 1 percent but less than 20 percent impaired, limited or restricted    Jean-aPul Rizo PTA  4/16/2018

## 2018-04-16 NOTE — PLAN OF CARE
Problem: Psychomotor Movement Impairment (Psychotic Signs/Symptoms) (Adult)  Goal: Improved Psychomotor Symptoms (Psychotic Signs/Symptoms)  Outcome: Ongoing (interventions implemented as appropriate)      Problem: Fall Risk (Adult)  Goal: Identify Related Risk Factors and Signs and Symptoms  Outcome: Ongoing (interventions implemented as appropriate)      Problem: Physiological Impairment (Excessive Substance Use) (Adult)  Goal: Improved Physiologic Symptoms (Excessive Substance Use)  Outcome: Ongoing (interventions implemented as appropriate)

## 2018-04-16 NOTE — PLAN OF CARE
Problem: Patient Care Overview  Goal: Discharge Needs Assessment  Outcome: Ongoing (interventions implemented as appropriate)    Goal: Interprofessional Rounds/Family Conf  Outcome: Ongoing (interventions implemented as appropriate)      Problem: Overarching Goals (Adult)  Goal: Adheres to Safety Considerations for Self and Others  Outcome: Ongoing (interventions implemented as appropriate)    Goal: Optimized Coping Skills in Response to Life Stressors  Outcome: Ongoing (interventions implemented as appropriate)    Goal: Develops/Participates in Therapeutic [place] to Support Successful Transition  Outcome: Ongoing (interventions implemented as appropriate)      Problem: Psychomotor Movement Impairment (Psychotic Signs/Symptoms) (Adult)  Goal: Improved Psychomotor Symptoms (Psychotic Signs/Symptoms)  Outcome: Ongoing (interventions implemented as appropriate)      Problem: Fall Risk (Adult)  Goal: Identify Related Risk Factors and Signs and Symptoms  Outcome: Ongoing (interventions implemented as appropriate)    Goal: Absence of Fall  Outcome: Ongoing (interventions implemented as appropriate)      Problem: Physiological Impairment (Excessive Substance Use) (Adult)  Goal: Improved Physiologic Symptoms (Excessive Substance Use)  Outcome: Ongoing (interventions implemented as appropriate)      Problem: Skin Injury Risk (Adult)  Goal: Identify Related Risk Factors and Signs and Symptoms  Outcome: Ongoing (interventions implemented as appropriate)

## 2018-04-16 NOTE — NURSING NOTE
Behavior   Anxiety: Difficulty concentrating  Depression: difficulty concentrating  Pain  0  AVH   no  S/I   no  H/I   no    Affect   calm and pleasant    Note:Pt is alert, oriented x3 verbal and ambulatory. Cooperative and compliant with staff and peers. Took meds as ordered. Will monitor for safety.       Intervention  Instructed in medication usage and effects  Medications administered as ordered  Encouraged to verbalize needs      Response  Verbalized understanding   Did patient take medications as ordered yes   Did patient interact with assessment?  yes     Plan  Will monitor for safety  Will monitor every 15 minutes as ordered  Will evaluate and promote the plan of care

## 2018-04-16 NOTE — PLAN OF CARE
Problem: Patient Care Overview  Goal: Plan of Care Review  Outcome: Ongoing (interventions implemented as appropriate)   04/16/18 0930   Coping/Psychosocial   Plan of Care Reviewed With patient   Plan of Care Review   Progress no change   OTHER   Outcome Summary Pt. deferred cont..d treatment after transfers & gt. due to B LE pain & discomfort at this time. Pt. met transfer goal this a.m. Cont. gt & LE strengthening    Coping/Psychosocial   Patient Agreement with Plan of Care agrees     Goal: Discharge Needs Assessment  Outcome: Ongoing (interventions implemented as appropriate)   04/11/18 0919   Discharge Needs Assessment   Readmission Within the Last 30 Days no previous admission in last 30 days   Concerns to be Addressed discharge planning   Patient/Family Anticipates Transition to home with family   Patient/Family Anticipated Services at Transition outpatient care   Transportation Concerns car, none   Transportation Anticipated car, drives self   Offered/Gave Vendor List no

## 2018-04-17 VITALS
DIASTOLIC BLOOD PRESSURE: 68 MMHG | RESPIRATION RATE: 18 BRPM | HEART RATE: 88 BPM | OXYGEN SATURATION: 97 % | WEIGHT: 160.8 LBS | BODY MASS INDEX: 23.82 KG/M2 | SYSTOLIC BLOOD PRESSURE: 121 MMHG | HEIGHT: 69 IN | TEMPERATURE: 96.9 F

## 2018-04-17 PROCEDURE — 99238 HOSP IP/OBS DSCHRG MGMT 30/<: CPT | Performed by: PSYCHIATRY & NEUROLOGY

## 2018-04-17 RX ORDER — DULOXETIN HYDROCHLORIDE 60 MG/1
60 CAPSULE, DELAYED RELEASE ORAL DAILY
Qty: 30 CAPSULE | Refills: 0 | Status: SHIPPED | OUTPATIENT
Start: 2018-04-18 | End: 2022-07-20

## 2018-04-17 RX ORDER — LANOLIN ALCOHOL/MO/W.PET/CERES
6 CREAM (GRAM) TOPICAL NIGHTLY
Qty: 60 TABLET | Refills: 0 | Status: SHIPPED | OUTPATIENT
Start: 2018-04-17

## 2018-04-17 RX ORDER — GABAPENTIN 300 MG/1
300 CAPSULE ORAL 3 TIMES DAILY
Qty: 90 CAPSULE | Refills: 0 | Status: SHIPPED | OUTPATIENT
Start: 2018-04-17 | End: 2022-07-20

## 2018-04-17 RX ADMIN — GABAPENTIN 300 MG: 300 CAPSULE ORAL at 09:22

## 2018-04-17 RX ADMIN — Medication 100 MG: at 09:22

## 2018-04-17 RX ADMIN — DULOXETINE 60 MG: 60 CAPSULE, DELAYED RELEASE ORAL at 09:22

## 2018-04-17 RX ADMIN — THERA TABS 1 TABLET: TAB at 09:22

## 2018-04-17 RX ADMIN — Medication 5 MG: at 09:22

## 2018-04-17 NOTE — THERAPY DISCHARGE NOTE
Behavioral Health - Physical Therapy Discharge Summary  [facility]       Patient Name: Jean Peñaloza  : 1980  MRN: [id number]    Today's Date: 2018  Onset of Illness/Injury or Date of Surgery: 18    Date of Referral to PT: 18  Referring Physician: Dr. Kelvin Mcqueen      Admit Date: 2018      PT Recommendation and Plan    Visit Dx:    ICD-10-CM ICD-9-CM   1. Impaired functional mobility, balance, gait, and endurance Z74.09 V49.89             Outcome Measures     Row Name 18 0930 04/15/18 1530          How much help from another person do you currently need...    Turning from your back to your side while in flat bed without using bedrails? 4  -JA 4  -JA     Moving from lying on back to sitting on the side of a flat bed without bedrails? 4  -JA 4  -JA     Moving to and from a bed to a chair (including a wheelchair)? 4  -JA 4  -JA     Standing up from a chair using your arms (e.g., wheelchair, bedside chair)? 4  -JA 4  -JA     Climbing 3-5 steps with a railing? 3  -JA 3  -JA     To walk in hospital room? 3  -JA 3  -JA     AM-PAC 6 Clicks Score 22  -JA 22  -JA        Functional Assessment    Outcome Measure Options AM-PAC 6 Clicks Basic Mobility (PT)  -JA AM-PAC 6 Clicks Basic Mobility (PT)  -JA       User Key  (r) = Recorded By, (t) = Taken By, (c) = Cosigned By    Initials Name Provider Type    LETICIA Rizo PTA Physical Therapy Assistant                        PT Discharge Summary  Anticipated Discharge Disposition (PT): home or self care  Reason for Discharge: Discharge from facility, Per MD order  Outcomes Achieved: Patient able to partially acheive established goals  Discharge Destination: Home      Huyen Martínez, PT   2018

## 2018-04-17 NOTE — SIGNIFICANT NOTE
"   04/17/18 1321   Family Counseling   Length of Session 30   Participants spouse   Topic Safety/dc plan   Patient Response CSW met with pt and his wife to discuss safety/dc plan. Pt presented to interview room, casually dressed, in his wheelchair. pt alert and oriented x3. Mood is good, affect is congruent. Pts thoughts are clear, denies SI/HI, AVH. Pt has no signs of psychosis at this time. Wife presents and is hesitant to accept patients motivation. Pt verbalizes his desire to return home and continue with intensive outpatient services through [facility]. pt stated \"I know what I need to do. Now I just have to do it.\" Wife his very blunt with pt, as she states \"He says this every time. He will be back here. He will just go home and use again.\" CSW validated wife's feelings and discussed her frustation. CSW encouraged wife to provide positive reinforcement, as well as encouraged pt to have positive self talk. CSW discussed healthy ways of coping with strss that is waiting for him at home. Pt was able to verbalize his plan to \"get rid of phone numbers\" and surround himself with positive people. Pt voiced plan to follow up at [facility], which appt and referral has been made, as well as continue to attend [employer]. CSW discussed [facility] and encouraged pt and wife to attend together. Wife agreed to plan, however, will not go home with pt, as they continue to be \".\" Pt will return home alone and follow up tomorrow with [facility]. CSW educated pt on Crisis Hotline, advised him to call as needed. Pts insight and judgement are fair. Pt participated well in tx, was med compliant. BPRS was completed upon dc.      "

## 2018-04-17 NOTE — PLAN OF CARE
Problem: Patient Care Overview  Goal: Plan of Care Review  Outcome: Ongoing (interventions implemented as appropriate)      Problem: Overarching Goals (Adult)  Goal: Adheres to Safety Considerations for Self and Others  Outcome: Ongoing (interventions implemented as appropriate)  Patient has been compliant with safety measures.  Goal: Optimized Coping Skills in Response to Life Stressors  Outcome: Ongoing (interventions implemented as appropriate)  Patient demonstrates improved coping skills  Goal: Develops/Participates in Therapeutic [place] to Support Successful Transition  Outcome: Ongoing (interventions implemented as appropriate)  Patient participates well in group activities    Problem: Psychomotor Movement Impairment (Psychotic Signs/Symptoms) (Adult)  Goal: Improved Psychomotor Symptoms (Psychotic Signs/Symptoms)  Outcome: Ongoing (interventions implemented as appropriate)      Problem: Fall Risk (Adult)  Goal: Identify Related Risk Factors and Signs and Symptoms  Outcome: Ongoing (interventions implemented as appropriate)      Problem: Physiological Impairment (Excessive Substance Use) (Adult)  Goal: Improved Physiologic Symptoms (Excessive Substance Use)  Outcome: Outcome(s) achieved Date Met: 04/17/18      Problem: Skin Injury Risk (Adult)  Goal: Identify Related Risk Factors and Signs and Symptoms  Outcome: Outcome(s) achieved Date Met: 04/17/18    Goal: Skin Health and Integrity  Outcome: Outcome(s) achieved Date Met: 04/17/18

## 2018-04-17 NOTE — DISCHARGE SUMMARY
Admission Date: 4/9/2018    Discharge Date: 4/17/2018    Psychiatric History: Patient is a 37 y.o. male who presents with psychosis. Onset of symptoms was an unknown time ago.  Symptoms have been present on a intemittent basis but currently he appears to be constantly hallucinating. Symptoms are associated with confusion and disorientation.  Symptoms are aggravated by problems with substance abuse.   Patient's symptoms occur in the context of fall back injury possibly in 2016 and current substance use.     Patient was laying in the bed and was reaching for things and speaking to things that were not there.  He apparently was very agitated and psychotic last night.  He was given 10mg of zyprexa at HS and received another prn 10mg dose a couple of hours later.  He continued to act up and was given geodon 20mg/ativan 2mg IM at 1 am.  He finally fell asleep around 2 am.     He was able to provide some lucid answers but others were illogical.  He despite being informed several times could not report his location correctly.  He was able to give clear answers on past events.  He is fixated on being at [facility] and seems to think things that are currently happen are related to that facility.     He has been using meth but denies any use since Feb but his UDS is positive.  He claims he has knots of meth in his muscle that will spontaneously release meth.  He may have been using ETOH heavily as well.  His home med list includes klonopin 1mg qid prn.     Psychiatric Review Of Systems:  Pertinent items are noted in HPI.     History  Past psychiatric history:     Psychiatric Hospitalizations: Patient has had no prior hospitalizations.     Suicide Attempts: Patient has had no prior suicide attempts.  He did consider after he fell and had back injury but he did not attempt.     Prior Treatment and Medications Tried: his home meds list klonopin 1mg qid prn, wellbutrin xl 150mg daily, neurontin 1000mg tid, trazodone 50mg qhs,  effexor-xr 150mg bid     History of violence or legal issues: 19yrs old for DUI and drug possession, 4yrs ago for DUI possilby for ETOH or something else; another one for a bad check       Diagnostic Data:    Recent Results (from the past 168 hour(s))   Comprehensive Metabolic Panel    Collection Time: 04/11/18  5:19 AM   Result Value Ref Range    Glucose 86 60 - 100 mg/dL    BUN 11 7 - 21 mg/dL    Creatinine 0.76 0.70 - 1.30 mg/dL    Sodium 138 137 - 145 mmol/L    Potassium 3.9 3.5 - 5.1 mmol/L    Chloride 100 95 - 110 mmol/L    CO2 27.0 22.0 - 31.0 mmol/L    Calcium 9.4 8.4 - 10.2 mg/dL    Total Protein 7.1 6.3 - 8.6 g/dL    Albumin 3.90 3.40 - 4.80 g/dL    ALT (SGPT) 18 (L) 21 - 72 U/L    AST (SGOT) 26 17 - 59 U/L    Alkaline Phosphatase 76 38 - 126 U/L    Total Bilirubin 0.5 0.2 - 1.3 mg/dL    eGFR Non African Amer 115 >60 mL/min/1.73    Globulin 3.2 2.3 - 3.5 gm/dL    A/G Ratio 1.2 1.1 - 1.8 g/dL    BUN/Creatinine Ratio 14.5 7.0 - 25.0    Anion Gap 11.0 5.0 - 15.0 mmol/L   CBC Auto Differential    Collection Time: 04/11/18  5:19 AM   Result Value Ref Range    WBC 6.50 3.20 - 9.80 10*3/mm3    RBC 4.32 (L) 4.37 - 5.74 10*6/mm3    Hemoglobin 12.6 (L) 13.7 - 17.3 g/dL    Hematocrit 37.8 (L) 39.0 - 49.0 %    MCV 87.5 80.0 - 98.0 fL    MCH 29.2 26.5 - 34.0 pg    MCHC 33.3 31.5 - 36.3 g/dL    RDW 13.7 11.5 - 14.5 %    RDW-SD 43.7 35.1 - 43.9 fl    MPV 9.1 8.0 - 12.0 fL    Platelets 266 150 - 450 10*3/mm3    Neutrophil % 45.2 37.0 - 80.0 %    Lymphocyte % 42.9 10.0 - 50.0 %    Monocyte % 7.4 0.0 - 12.0 %    Eosinophil % 3.8 0.0 - 7.0 %    Basophil % 0.5 0.0 - 2.0 %    Immature Grans % 0.2 0.0 - 0.5 %    Neutrophils, Absolute 2.94 2.00 - 8.60 10*3/mm3    Lymphocytes, Absolute 2.79 0.60 - 4.20 10*3/mm3    Monocytes, Absolute 0.48 0.00 - 0.90 10*3/mm3    Eosinophils, Absolute 0.25 0.00 - 0.70 10*3/mm3    Basophils, Absolute 0.03 0.00 - 0.20 10*3/mm3    Immature Grans, Absolute 0.01 0.00 - 0.02 10*3/mm3   Folate     Collection Time: 04/13/18  6:05 AM   Result Value Ref Range    Folate 7.11 2.76 - 21.00 ng/mL   Vitamin B12    Collection Time: 04/13/18  6:05 AM   Result Value Ref Range    Vitamin B-12 228 (L) 239 - 931 pg/mL     Ct Head Without Contrast    Result Date: 4/12/2018  Narrative: CT Head Without Contrast History: Altered mental status. Axial scans of the brain were obtained without intravenous contrast.  Coronal and sagital reconstructions were preformed. This exam was performed according to our departmental dose-optimization program, which includes automated exposure control, adjustment of the mA and/or kV according to patient size and/or use of iterative reconstruction technique. DLP: 942.10 Comparison: December 17, 2016. Bone windows are unremarkable. The visualized paranasal sinuses are unremarkable. No hemorrhage. No mass. No abnormal areas of increased or decreased attenuation. No midline shift. No abnormal extra-axial fluid collections.     Impression: CONCLUSION: Normal nonenhanced CT of the brain 03401 Electronically signed by:  Uriah Cutler MD  4/12/2018 4:23 PM CDT Workstation: [email or web address]      Summary of Hospital Course:  Patient was admitted to the behavioral health unit at [facility] to ensure patient safety.  Patient was provided treatment with the unit milieu, activities, therapies and psychopharmacological management.  Patient was placed on Q15 minute checks and Elopement, Aggression and Fall.  Dr. Mcqueen was consulted for management of medical co-morbidities.  Patient was restarted on the following psychiatric medications: Stopped the home effexor, wellbutrin and klonopin.  The following medication changes were made during the hospital stay: Started risperdal 1mg tid and ativan 1mg q8hrs and CIWA and geodon IM prn for agitation.  Restarted the neurontin 600mg tid and baclofen 5mg tid due to pain and spasticity.  He continued to be delusional and hallucinating and disoriented.  He  did not have any autonomic instability so we reduced the ativan to 0.5mg tid and eventually stopped it.  He was started on melatonin 6mg qhs, multivitamin and thiamine.  His neurontin was decreased to 300mg tid.  His psychosis began to improve and he was gradually tapered off the risperdal due to psychosis being due to drug use.  He had no return of psychosis or disorientation.  He was started on cymbalta at 30mg and increased to 60mg to address pain and depression.  Attempted to lower the neurontin so that he could get into residential rehab but his pain got worse at 200mg tid.  Due to his wheelchair residential rehabs were reluctant take him and he decided to do outpatient rehab.  Thus we went back up on the neurontin to 300mg tid.     Patient had improvement over the course of the hospital stay and tolerated his medications.  Patient had family session with wife.  Substance abuse issues were present.  He had renewed motivation for sobriety after this experience and he agreed to got to outpatient rehab.    Patients Condition at Discharge:  Patient is stable for discharge and is not an imminent threat to self or others.  The patient's behavrior was Appropriate.  Patient reported that mood was Euthymic.  Patient's affect was normal.  Patient's thought content was as follows:   Suicidal:  None   Homicidal:  None   Hallucinations:  None   Delusion:  None    Discharge Diagnosis:  Principal Problem:    Amphetamine-induced psychotic disorder with hallucinations  Active Problems:    Amphetamine use disorder, severe    Alcohol use disorder, severe, dependence    Chronic pain associated with significant psychosocial dysfunction      Discharge Medications:      Your medication list      START taking these medications      Instructions Last Dose Given Next Dose Due   DULoxetine 60 MG capsule  Commonly known as:  CYMBALTA  Start taking on:  4/18/2018      Take 1 capsule by mouth Daily.       gabapentin 300 MG capsule  Commonly  known as:  NEURONTIN  Replaces:  gabapentin 600 MG tablet      Take 1 capsule by mouth 3 (Three) Times a Day.       melatonin 3 MG tablet      Take 2 tablets by mouth Every Night.          CONTINUE taking these medications      Instructions Last Dose Given Next Dose Due   baclofen 10 MG tablet  Commonly known as:  LIORESAL      Take 0.5 tablets by mouth 3 (Three) Times a Day.          STOP taking these medications    acetaminophen-codeine 300-30 MG per tablet  Commonly known as:  TYLENOL #3        buPROPion  MG 12 hr tablet  Commonly known as:  WELLBUTRIN SR        clonazePAM 1 MG tablet  Commonly known as:  KLONOPIN        docusate sodium 100 MG capsule  Commonly known as:  COLACE        gabapentin 400 MG capsule  Commonly known as:  NEURONTIN        gabapentin 600 MG tablet  Commonly known as:  NEURONTIN  Replaced by:  gabapentin 300 MG capsule        megestrol 20 MG tablet  Commonly known as:  MEGACE        mupirocin 2 % ointment  Commonly known as:  BACTROBAN        sulfamethoxazole-trimethoprim 800-160 MG per tablet  Commonly known as:  BACTRIM DS        traZODone 50 MG tablet  Commonly known as:  DESYREL        venlafaxine  MG 24 hr capsule  Commonly known as:  EFFEXOR-XR              Where to Get Your Medications      These medications were sent to [facility] [address] [place] [address] AT [facility] [address] - [phone] [facility] [phone] [address] [address]    Phone:  [phone]    DULoxetine 60 MG capsule   melatonin 3 MG tablet     You can get these medications from any pharmacy    Bring a paper prescription for each of these medications   gabapentin 300 MG capsule         Justification for multiple antipsychotic medications at discharge:  Not Applicable.    Medication for smoking cessation: Patient declines prescriptions of any cessation agents.    Medication for substance abuse: Patient has a substance use diagnosis but there is no FDA  indicated medication to treat this diagnosis.    Disposition: Patient was discharged home with family.    Follow-up Information     [facility] - [facility]. Go in 1 day(s).    Why:  [facility] to Intensive Outpatient substance abuse services    Hours are M-F from 8:30-4    Take ID, Ins Card, SS Card, and med bottles to follow up appt    Call Crisis Hotline as needed at [phone]  Contact information:  [address] [place] [address]  [phone]           Valeria Corral, APRN .    Specialty:  Family Medicine  Contact information:  [address] [place] [address]  [phone]                   Psychiatric follow up will be with [facility].  Medical follow up will be with primary care physician.    Time Spent: Less than 30 minutes.    Devan Riggins MD  04/17/18  11:01 AM

## 2018-04-17 NOTE — PLAN OF CARE
Problem: Overarching Goals (Adult)  Goal: Adheres to Safety Considerations for Self and Others  Outcome: Ongoing (interventions implemented as appropriate)    Goal: Optimized Coping Skills in Response to Life Stressors  Outcome: Ongoing (interventions implemented as appropriate)    Goal: Develops/Participates in Therapeutic Parsons to Support Successful Transition  Outcome: Ongoing (interventions implemented as appropriate)      Problem: Psychomotor Movement Impairment (Psychotic Signs/Symptoms) (Adult)  Goal: Improved Psychomotor Symptoms (Psychotic Signs/Symptoms)  Outcome: Ongoing (interventions implemented as appropriate)      Problem: Fall Risk (Adult)  Goal: Identify Related Risk Factors and Signs and Symptoms  Outcome: Ongoing (interventions implemented as appropriate)      Problem: Physiological Impairment (Excessive Substance Use) (Adult)  Goal: Improved Physiologic Symptoms (Excessive Substance Use)  Outcome: Ongoing (interventions implemented as appropriate)

## 2018-04-17 NOTE — NURSING NOTE
"Behavior   Anxiety: Feeling worried  Depression: disturbed sleep  Pain  0  AVH   no  S/I   no  H/I   no    Affect   calm and pleasant    Note: Patient cooperative with staff, states he feels so much better, denies any thoughts of self-harm. Patient states he knows he \"will have to stay on track\" when he is discharged. Discussed the importance of continuing care on an outpatient basis.      Intervention  Instructed in medication usage and effects  Medications administered as ordered  Encouraged to verbalize needs      Response  Verbalized understanding   Did patient take medications as ordered yes   Did patient interact with assessment?  yes     Plan  Will monitor for safety  Will monitor every 15 minutes as ordered  Will evaluate and promote the plan of care    "

## 2018-04-17 NOTE — NURSING NOTE
Behavior   Anxiety: denies  Depression: denies  Pain  0  AVH   no  S/I   no  H/I   no    Affect   calm and pleasant    Note:   Pt has been in common area to make a phone call and eat a snack. Pt has been resting in his room since snack and has been pleasant. Pt denies hallucinations and states he feels guilty about things he did or said when he couldn't remember. Pt is happy and denies all the above. Pt is currently in room resting.      Intervention  Instructed in medication usage and effects  Medications administered as ordered  Encouraged to verbalize needs      Response  Verbalized understanding   Did patient take medications as ordered yes  Did patient interact with assessment?  yes     Plan  Will monitor for safety  Will monitor every 15 minutes as ordered  Will evaluate and promote the plan of care

## 2018-04-17 NOTE — NURSING NOTE
Patient discharged from [facility], pt stable, no s/s of distress noted. All discharge paperwork, medications and follow up appointments reviewed with patient. Patient denies any questions and verbalized understanding. Prescriptions sent to [facility] and medication returned to patient from hospital pharmacy. All personal items sent with patient.

## 2018-04-21 LAB — RPR SER QL: NORMAL

## 2018-05-01 ENCOUNTER — TELEPHONE (OUTPATIENT)
Dept: FAMILY MEDICINE CLINIC | Facility: CLINIC | Age: 38
End: 2018-05-01

## 2018-06-28 ENCOUNTER — DOCUMENTATION (OUTPATIENT)
Dept: PHYSICAL THERAPY | Facility: HOSPITAL | Age: 38
End: 2018-06-28

## 2018-06-28 DIAGNOSIS — M25.511 ACUTE PAIN OF RIGHT SHOULDER: ICD-10-CM

## 2018-06-28 DIAGNOSIS — S42.254D CLOSED NONDISPLACED FRACTURE OF GREATER TUBEROSITY OF RIGHT HUMERUS WITH ROUTINE HEALING, SUBSEQUENT ENCOUNTER: Primary | ICD-10-CM
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
## 2022-07-20 ENCOUNTER — HOSPITAL ENCOUNTER (OUTPATIENT)
Facility: HOSPITAL | Age: 42
Setting detail: OBSERVATION
Discharge: HOME | End: 2022-07-22
Attending: EMERGENCY MEDICINE | Admitting: INTERNAL MEDICINE

## 2022-07-20 ENCOUNTER — APPOINTMENT (OUTPATIENT)
Dept: CT IMAGING | Facility: HOSPITAL | Age: 42
End: 2022-07-20

## 2022-07-20 DIAGNOSIS — K56.699 OTHER SPECIFIED INTESTINAL OBSTRUCTION, UNSPECIFIED WHETHER PARTIAL OR COMPLETE: Primary | ICD-10-CM

## 2022-07-20 PROBLEM — K56.609 INTESTINAL OBSTRUCTION: Status: ACTIVE | Noted: 2022-07-20

## 2022-07-20 LAB
ALBUMIN SERPL-MCNC: 4 G/DL (ref 3.5–5.2)
ALBUMIN/GLOB SERPL: 1.1 G/DL
ALP SERPL-CCNC: 96 U/L (ref 39–117)
ALT SERPL W P-5'-P-CCNC: 19 U/L (ref 1–41)
ANION GAP SERPL CALCULATED.3IONS-SCNC: 13 MMOL/L (ref 5–15)
AST SERPL-CCNC: 19 U/L (ref 1–40)
BASOPHILS # BLD AUTO: 0.06 10*3/MM3 (ref 0–0.2)
BASOPHILS NFR BLD AUTO: 0.4 % (ref 0–1.5)
BILIRUB SERPL-MCNC: 0.4 MG/DL (ref 0–1.2)
BILIRUB UR QL STRIP: NEGATIVE
BUN SERPL-MCNC: 11 MG/DL (ref 6–20)
BUN/CREAT SERPL: 12.8 (ref 7–25)
CALCIUM SPEC-SCNC: 8.7 MG/DL (ref 8.6–10.5)
CHLORIDE SERPL-SCNC: 101 MMOL/L (ref 98–107)
CLARITY UR: CLEAR
CO2 SERPL-SCNC: 21 MMOL/L (ref 22–29)
COLOR UR: YELLOW
CREAT SERPL-MCNC: 0.86 MG/DL (ref 0.76–1.27)
D-LACTATE SERPL-SCNC: 1.4 MMOL/L (ref 0.5–2)
DEPRECATED RDW RBC AUTO: 42.5 FL (ref 37–54)
EGFRCR SERPLBLD CKD-EPI 2021: 111.6 ML/MIN/1.73
EOSINOPHIL # BLD AUTO: 0.14 10*3/MM3 (ref 0–0.4)
EOSINOPHIL NFR BLD AUTO: 0.8 % (ref 0.3–6.2)
ERYTHROCYTE [DISTWIDTH] IN BLOOD BY AUTOMATED COUNT: 14.1 % (ref 12.3–15.4)
FLUAV RNA RESP QL NAA+PROBE: NOT DETECTED
FLUBV RNA RESP QL NAA+PROBE: NOT DETECTED
GLOBULIN UR ELPH-MCNC: 3.7 GM/DL
GLUCOSE SERPL-MCNC: 108 MG/DL (ref 65–99)
GLUCOSE UR STRIP-MCNC: NEGATIVE MG/DL
HCT VFR BLD AUTO: 51.9 % (ref 37.5–51)
HGB BLD-MCNC: 16.9 G/DL (ref 13–17.7)
HGB UR QL STRIP.AUTO: NEGATIVE
IMM GRANULOCYTES # BLD AUTO: 0.11 10*3/MM3 (ref 0–0.05)
IMM GRANULOCYTES NFR BLD AUTO: 0.7 % (ref 0–0.5)
KETONES UR QL STRIP: ABNORMAL
LEUKOCYTE ESTERASE UR QL STRIP.AUTO: NEGATIVE
LIPASE SERPL-CCNC: 14 U/L (ref 13–60)
LYMPHOCYTES # BLD AUTO: 1.39 10*3/MM3 (ref 0.7–3.1)
LYMPHOCYTES NFR BLD AUTO: 8.4 % (ref 19.6–45.3)
MAGNESIUM SERPL-MCNC: 2.4 MG/DL (ref 1.6–2.6)
MCH RBC QN AUTO: 27.5 PG (ref 26.6–33)
MCHC RBC AUTO-ENTMCNC: 32.6 G/DL (ref 31.5–35.7)
MCV RBC AUTO: 84.5 FL (ref 79–97)
MONOCYTES # BLD AUTO: 0.64 10*3/MM3 (ref 0.1–0.9)
MONOCYTES NFR BLD AUTO: 3.8 % (ref 5–12)
NEUTROPHILS NFR BLD AUTO: 14.29 10*3/MM3 (ref 1.7–7)
NEUTROPHILS NFR BLD AUTO: 85.9 % (ref 42.7–76)
NITRITE UR QL STRIP: NEGATIVE
NRBC BLD AUTO-RTO: 0 /100 WBC (ref 0–0.2)
PH UR STRIP.AUTO: <=5 [PH] (ref 5–9)
PLATELET # BLD AUTO: 359 10*3/MM3 (ref 140–450)
PMV BLD AUTO: 8.9 FL (ref 6–12)
POTASSIUM SERPL-SCNC: 4.8 MMOL/L (ref 3.5–5.2)
PROT SERPL-MCNC: 7.7 G/DL (ref 6–8.5)
PROT UR QL STRIP: ABNORMAL
RBC # BLD AUTO: 6.14 10*6/MM3 (ref 4.14–5.8)
SARS-COV-2 RNA RESP QL NAA+PROBE: NOT DETECTED
SODIUM SERPL-SCNC: 135 MMOL/L (ref 136–145)
SP GR UR STRIP: 1.07 (ref 1–1.03)
UROBILINOGEN UR QL STRIP: ABNORMAL
WBC NRBC COR # BLD: 16.63 10*3/MM3 (ref 3.4–10.8)

## 2022-07-20 PROCEDURE — 80053 COMPREHEN METABOLIC PANEL: CPT | Performed by: EMERGENCY MEDICINE

## 2022-07-20 PROCEDURE — 99285 EMERGENCY DEPT VISIT HI MDM: CPT

## 2022-07-20 PROCEDURE — 85025 COMPLETE CBC W/AUTO DIFF WBC: CPT | Performed by: EMERGENCY MEDICINE

## 2022-07-20 PROCEDURE — 83605 ASSAY OF LACTIC ACID: CPT | Performed by: EMERGENCY MEDICINE

## 2022-07-20 PROCEDURE — 99244 OFF/OP CNSLTJ NEW/EST MOD 40: CPT | Performed by: SURGERY

## 2022-07-20 PROCEDURE — G0378 HOSPITAL OBSERVATION PER HR: HCPCS

## 2022-07-20 PROCEDURE — 74177 CT ABD & PELVIS W/CONTRAST: CPT

## 2022-07-20 PROCEDURE — 36415 COLL VENOUS BLD VENIPUNCTURE: CPT | Performed by: EMERGENCY MEDICINE

## 2022-07-20 PROCEDURE — 96375 TX/PRO/DX INJ NEW DRUG ADDON: CPT

## 2022-07-20 PROCEDURE — 25010000002 ONDANSETRON PER 1 MG: Performed by: EMERGENCY MEDICINE

## 2022-07-20 PROCEDURE — 96376 TX/PRO/DX INJ SAME DRUG ADON: CPT

## 2022-07-20 PROCEDURE — 25010000002 IOPAMIDOL 61 % SOLUTION: Performed by: EMERGENCY MEDICINE

## 2022-07-20 PROCEDURE — 96374 THER/PROPH/DIAG INJ IV PUSH: CPT

## 2022-07-20 PROCEDURE — C9803 HOPD COVID-19 SPEC COLLECT: HCPCS

## 2022-07-20 PROCEDURE — 83735 ASSAY OF MAGNESIUM: CPT | Performed by: EMERGENCY MEDICINE

## 2022-07-20 PROCEDURE — 25010000002 MORPHINE PER 10 MG: Performed by: NURSE PRACTITIONER

## 2022-07-20 PROCEDURE — 81003 URINALYSIS AUTO W/O SCOPE: CPT | Performed by: EMERGENCY MEDICINE

## 2022-07-20 PROCEDURE — 25010000002 MORPHINE PER 10 MG: Performed by: EMERGENCY MEDICINE

## 2022-07-20 PROCEDURE — 87636 SARSCOV2 & INF A&B AMP PRB: CPT

## 2022-07-20 PROCEDURE — 25010000002 ONDANSETRON PER 1 MG: Performed by: NURSE PRACTITIONER

## 2022-07-20 PROCEDURE — 83690 ASSAY OF LIPASE: CPT | Performed by: EMERGENCY MEDICINE

## 2022-07-20 RX ORDER — SODIUM CHLORIDE 0.9 % (FLUSH) 0.9 %
10 SYRINGE (ML) INJECTION AS NEEDED
Status: DISCONTINUED | OUTPATIENT
Start: 2022-07-20 | End: 2022-07-22 | Stop reason: HOSPADM

## 2022-07-20 RX ORDER — PANTOPRAZOLE SODIUM 40 MG/10ML
40 INJECTION, POWDER, LYOPHILIZED, FOR SOLUTION INTRAVENOUS
Status: DISCONTINUED | OUTPATIENT
Start: 2022-07-21 | End: 2022-07-22 | Stop reason: HOSPADM

## 2022-07-20 RX ORDER — LANOLIN ALCOHOL/MO/W.PET/CERES
6 CREAM (GRAM) TOPICAL NIGHTLY
Status: DISCONTINUED | OUTPATIENT
Start: 2022-07-20 | End: 2022-07-22 | Stop reason: HOSPADM

## 2022-07-20 RX ORDER — DULOXETIN HYDROCHLORIDE 60 MG/1
60 CAPSULE, DELAYED RELEASE ORAL DAILY
Status: DISCONTINUED | OUTPATIENT
Start: 2022-07-20 | End: 2022-07-20

## 2022-07-20 RX ORDER — ONDANSETRON 2 MG/ML
4 INJECTION INTRAMUSCULAR; INTRAVENOUS ONCE
Status: COMPLETED | OUTPATIENT
Start: 2022-07-20 | End: 2022-07-20

## 2022-07-20 RX ORDER — SODIUM CHLORIDE 0.9 % (FLUSH) 0.9 %
10 SYRINGE (ML) INJECTION EVERY 12 HOURS SCHEDULED
Status: DISCONTINUED | OUTPATIENT
Start: 2022-07-20 | End: 2022-07-22 | Stop reason: HOSPADM

## 2022-07-20 RX ORDER — NALOXONE HCL 0.4 MG/ML
0.4 VIAL (ML) INJECTION
Status: DISCONTINUED | OUTPATIENT
Start: 2022-07-20 | End: 2022-07-22 | Stop reason: HOSPADM

## 2022-07-20 RX ORDER — MORPHINE SULFATE 2 MG/ML
1 INJECTION, SOLUTION INTRAMUSCULAR; INTRAVENOUS EVERY 4 HOURS PRN
Status: DISCONTINUED | OUTPATIENT
Start: 2022-07-20 | End: 2022-07-22 | Stop reason: HOSPADM

## 2022-07-20 RX ORDER — ONDANSETRON 2 MG/ML
4 INJECTION INTRAMUSCULAR; INTRAVENOUS EVERY 6 HOURS PRN
Status: DISCONTINUED | OUTPATIENT
Start: 2022-07-20 | End: 2022-07-22 | Stop reason: HOSPADM

## 2022-07-20 RX ADMIN — MORPHINE SULFATE 1 MG: 2 INJECTION, SOLUTION INTRAMUSCULAR; INTRAVENOUS at 17:09

## 2022-07-20 RX ADMIN — ONDANSETRON 4 MG: 2 INJECTION INTRAMUSCULAR; INTRAVENOUS at 17:08

## 2022-07-20 RX ADMIN — MELATONIN 6 MG: 3 TAB ORAL at 20:24

## 2022-07-20 RX ADMIN — ONDANSETRON 4 MG: 2 INJECTION INTRAMUSCULAR; INTRAVENOUS at 12:35

## 2022-07-20 RX ADMIN — Medication 10 ML: at 20:25

## 2022-07-20 RX ADMIN — SODIUM CHLORIDE 1000 ML: 9 INJECTION, SOLUTION INTRAVENOUS at 12:34

## 2022-07-20 RX ADMIN — IOPAMIDOL 90 ML: 612 INJECTION, SOLUTION INTRAVENOUS at 13:09

## 2022-07-20 RX ADMIN — MORPHINE SULFATE 1 MG: 2 INJECTION, SOLUTION INTRAMUSCULAR; INTRAVENOUS at 23:52

## 2022-07-20 RX ADMIN — MORPHINE SULFATE 4 MG: 4 INJECTION INTRAVENOUS at 12:35

## 2022-07-20 RX ADMIN — ONDANSETRON 4 MG: 2 INJECTION INTRAMUSCULAR; INTRAVENOUS at 23:52

## 2022-07-20 NOTE — CONSULTS
Patient Care Team:  Valeria Tamayo APRN as PCP - General  Pawel Howell APRN as Nurse Practitioner (Orthopedic Surgery)  Soren Barrientos MD as Consulting Physician (Orthopedic Surgery)      Subjective .     History of present illness: 41-year-old male who states that he began having diarrhea yesterday afternoon.  He states that it was very watery and nonbloody.  This morning he had another episode of diarrhea and began having right-sided abdominal pain that was dull and achy in nature.  He also noted some mild nausea once he got to the hospital.  He denies emesis.  He has never had symptoms like this before.  He denies history of previous small bowel obstruction or abdominal surgery.    Review of Systems   Constitutional: Positive for appetite change.   HENT: Negative.    Eyes: Negative.    Respiratory: Negative.    Cardiovascular: Negative.    Gastrointestinal: Positive for abdominal pain and diarrhea. Negative for vomiting.   Genitourinary: Negative.    Musculoskeletal: Negative.    Skin: Negative.    Neurological: Negative.    Psychiatric/Behavioral: Negative.          History  Past Medical History:   Diagnosis Date   • Acute bronchitis    • Anxiety    • Cluster headache 2015   • Conjunctivitis 2014   • Depression    • Dorsalgia    • Malaise    • Panic disorder    • Shoulder pain     severe on right      ,   Past Surgical History:   Procedure Laterality Date   • INJECTION OF MEDICATION  2015    KENALOG(2)   • NECK SURGERY  2016    [facility]   ,   Family History   Problem Relation Age of Onset   • Cancer Maternal Grandfather    • Diabetes Maternal Grandfather    ,   Social History     Tobacco Use   • Smoking status: Current Every Day Smoker     Packs/day: 0.50     Types: Cigarettes     Last attempt to quit: 2014     Years since quittin.9   • Smokeless tobacco: Never Used   • Tobacco comment: Smoking cessation information given   Substance Use Topics   •  Alcohol use: No   • Drug use: Yes     Types: Methamphetamines     Comment: Feb 20th   , Home Medications:  Prior to Admission medications    Medication Sig Start Date End Date Taking? Authorizing Provider   baclofen (LIORESAL) 10 MG tablet Take 0.5 tablets by mouth 3 (Three) Times a Day. 7/20/17   Valeria Tamayo APRN   DULoxetine (CYMBALTA) 60 MG capsule Take 1 capsule by mouth Daily. 4/18/18   Devan Riggins MD   gabapentin (NEURONTIN) 300 MG capsule Take 1 capsule by mouth 3 (Three) Times a Day. 4/17/18   Devan Riggins MD   melatonin 3 MG tablet Take 2 tablets by mouth Every Night. 4/17/18   Devan Riggins MD   , Scheduled Meds:   , Continuous Infusions:   , PRN Meds:  •  [COMPLETED] Insert peripheral IV **AND** sodium chloride and Allergies:  No Known Allergies    Objective     Vital Signs   Temp:  [97.9 °F (36.6 °C)] 97.9 °F (36.6 °C)  Heart Rate:  [] 87  Resp:  [18-20] 18  BP: (130-139)/(79-81) 131/81    Physical Exam:  Physical Exam  Constitutional:       General: He is not in acute distress.     Appearance: Normal appearance.   HENT:      Head: Normocephalic and atraumatic.   Eyes:      Extraocular Movements: Extraocular movements intact.      Pupils: Pupils are equal, round, and reactive to light.   Cardiovascular:      Rate and Rhythm: Normal rate and regular rhythm.      Pulses: Normal pulses.      Heart sounds: Normal heart sounds.   Pulmonary:      Effort: Pulmonary effort is normal. No respiratory distress.      Breath sounds: Normal breath sounds.   Abdominal:      Comments: Soft, mildly distended, mildly tender to palpation on the right upper and lower quadrants without rebound or guarding   Musculoskeletal:         General: No swelling or deformity.   Skin:     General: Skin is warm and dry.      Coloration: Skin is not jaundiced.   Neurological:      General: No focal deficit present.      Mental Status: He is alert and oriented to person, place, and time.    Psychiatric:         Mood and Affect: Mood normal.         Behavior: Behavior normal.               Results from last 7 days   Lab Units 07/20/22  1233   WBC 10*3/mm3 16.63*   HEMOGLOBIN g/dL 16.9   HEMATOCRIT % 51.9*   PLATELETS 10*3/mm3 359       Results Review:   I reviewed the patient's new clinical results.  I personally reviewed his CT scan images and agree with the report      Assessment & Plan     41-year-old male with recent onset diarrhea, abdominal pain, and nausea.  While the patient's CT scan is concerning for small bowel obstruction, given his lack of abdominal surgery history and the nature of his symptoms, I suspect he has an acute gastroenteritis.  The patient should be kept n.p.o. and given IV hydration.  If he has emesis, an NG tube should be placed.  Ambulation should be encouraged.  I will follow along with the patient's hospital course.      I discussed the patient's findings and my recommendations with patient and family      This document has been electronically signed by Kelechi Sosa MD on July 20, 2022 14:26 CDT     Kelechi Sosa MD  07/20/22  14:26 CDT

## 2022-07-20 NOTE — ED PROVIDER NOTES
Subjective   41 year old male patient presents to ER with complaint of right abdominal pain with distention, diarrhea, nausea, and chills. He reports that the diarrhea started yesterday, with pain starting this AM. He denies vomiting, fever, or urinary symptoms. His pain is currently 7/10, constant, and radiates into right lower back. He smokes less than half a pack a day of cigarettes, denies ETOH, and uses methamphetamine and marijuana. Last meth use 2 days ago. Long history of drug use.           Review of Systems   Constitutional: Positive for chills. Negative for fever.   HENT: Negative.    Eyes: Negative.    Respiratory: Negative.    Cardiovascular: Negative.    Gastrointestinal: Positive for abdominal distention, abdominal pain, diarrhea and nausea. Negative for blood in stool and vomiting.   Endocrine: Negative.    Genitourinary: Negative.  Negative for dysuria.   Musculoskeletal: Negative.    Skin: Negative.    Allergic/Immunologic: Negative.    Neurological: Negative.    Hematological: Negative.    Psychiatric/Behavioral: Negative.        Past Medical History:   Diagnosis Date   • Acute bronchitis    • Anxiety    • Cluster headache 2015   • Conjunctivitis 2014   • Depression    • Dorsalgia    • Malaise    • Panic disorder    • Shoulder pain     severe on right          No Known Allergies    Past Surgical History:   Procedure Laterality Date   • INJECTION OF MEDICATION  2015    KENALOG(2)   • [facility]       Family History   Problem Relation Age of Onset   • Cancer Maternal Grandfather    • Diabetes Maternal Grandfather        Social History     Socioeconomic History   • Marital status:    Tobacco Use   • Smoking status: Current Every Day Smoker     Packs/day: 0.50     Types: Cigarettes     Last attempt to quit: 2014     Years since quittin.9   • Smokeless tobacco: Never Used   • Tobacco comment: Smoking cessation information given   Substance  "and Sexual Activity   • Alcohol use: No   • Drug use: Yes     Types: Methamphetamines     Comment: Feb 20th   • Sexual activity: Defer           Objective    /81 (BP Location: Left arm, Patient Position: Lying)   Pulse 87   Temp 97.9 °F (36.6 °C) (Oral)   Resp 18   Ht 175.3 cm (69\")   Wt 84.7 kg (186 lb 11.2 oz)   SpO2 99%   BMI 27.57 kg/m²     Physical Exam  Vitals and nursing note reviewed. Exam conducted with a chaperone present.   Constitutional:       General: He is not in acute distress.     Appearance: Normal appearance. He is ill-appearing. He is not toxic-appearing or diaphoretic.   HENT:      Head: Normocephalic.      Nose: Nose normal.      Mouth/Throat:      Mouth: Mucous membranes are moist.   Eyes:      Pupils: Pupils are equal, round, and reactive to light.   Cardiovascular:      Rate and Rhythm: Regular rhythm. Tachycardia present.      Pulses: Normal pulses.      Heart sounds: Normal heart sounds.   Pulmonary:      Effort: Pulmonary effort is normal.      Breath sounds: Normal breath sounds.   Abdominal:      General: Bowel sounds are normal. There is distension.      Palpations: Abdomen is soft.      Tenderness: There is abdominal tenderness in the right upper quadrant, right lower quadrant and suprapubic area. There is no right CVA tenderness or left CVA tenderness.      Comments: Wounds noted to right side of abdomen for methamphetamine use   Musculoskeletal:         General: Normal range of motion.      Cervical back: Normal range of motion.   Skin:     General: Skin is warm and dry.      Capillary Refill: Capillary refill takes less than 2 seconds.      Comments: Wounds noted to bilateral arms, worse on left arm, from methamphetamine use   Neurological:      Mental Status: He is alert and oriented to person, place, and time.   Psychiatric:         Mood and Affect: Mood normal.         Behavior: Behavior normal.         Thought Content: Thought content normal.         Judgment: " Judgment normal.         Procedures           ED Course  ED Course as of 07/20/22 1350   Wed Jul 20, 2022   1340 Spoke with Dr. Sosa who agrees to see patient in ER  [HS]      ED Course User Index  [HS] Abby Parsons, REG      Results for orders placed or performed during the hospital encounter of 07/20/22   Lipase    Specimen: Blood   Result Value Ref Range    Lipase 14 13 - 60 U/L   Lactic Acid, Plasma    Specimen: Blood   Result Value Ref Range    Lactate 1.4 0.5 - 2.0 mmol/L   Magnesium    Specimen: Blood   Result Value Ref Range    Magnesium 2.4 1.6 - 2.6 mg/dL   CBC Auto Differential    Specimen: Blood   Result Value Ref Range    WBC 16.63 (H) 3.40 - 10.80 10*3/mm3    RBC 6.14 (H) 4.14 - 5.80 10*6/mm3    Hemoglobin 16.9 13.0 - 17.7 g/dL    Hematocrit 51.9 (H) 37.5 - 51.0 %    MCV 84.5 79.0 - 97.0 fL    MCH 27.5 26.6 - 33.0 pg    MCHC 32.6 31.5 - 35.7 g/dL    RDW 14.1 12.3 - 15.4 %    RDW-SD 42.5 37.0 - 54.0 fl    MPV 8.9 6.0 - 12.0 fL    Platelets 359 140 - 450 10*3/mm3    Neutrophil % 85.9 (H) 42.7 - 76.0 %    Lymphocyte % 8.4 (L) 19.6 - 45.3 %    Monocyte % 3.8 (L) 5.0 - 12.0 %    Eosinophil % 0.8 0.3 - 6.2 %    Basophil % 0.4 0.0 - 1.5 %    Immature Grans % 0.7 (H) 0.0 - 0.5 %    Neutrophils, Absolute 14.29 (H) 1.70 - 7.00 10*3/mm3    Lymphocytes, Absolute 1.39 0.70 - 3.10 10*3/mm3    Monocytes, Absolute 0.64 0.10 - 0.90 10*3/mm3    Eosinophils, Absolute 0.14 0.00 - 0.40 10*3/mm3    Basophils, Absolute 0.06 0.00 - 0.20 10*3/mm3    Immature Grans, Absolute 0.11 (H) 0.00 - 0.05 10*3/mm3    nRBC 0.0 0.0 - 0.2 /100 WBC     CT Abdomen Pelvis With Contrast    Result Date: 7/20/2022  CT abdomen and pelvis with IV contrast July 20, 2022 INDICATION: Right-sided pain with distention TECHNIQUE: Spiral images obtained from diaphragm through rectum following intravenous administration of 90 mL of Isovue-300. Sagittal, axial and coronal reformatted images generated retrospectively. Oral contrast was not  administered prior to imaging. This exam was performed according to our departmental dose-optimization program, which includes automated exposure control, adjustment of the mA and/or kV according to patient size and/or use of iterative reconstruction technique. FINDINGS: Liver grossly normal. Spleen normal. Cholelithiasis without cholecystitis. Pancreas grossly normal. Adrenals normal. No acute or worrisome focal renal pathology. Distended stomach contains fluid and what I suspect is recently ingested material. There is small bowel dilatation with air-fluid levels extending from mid jejunum to the level of the mid to distal ileum where there are two abnormal small bowel loops present. More distal ileal loop with an approximately 8.6 cm long segment with thickening of the wall. There is a os 7.5 cm narrowing of the adjacent ileal loop. Prominent fat in the mesentery  these. Appendix normal. No masses or fluid collections. No gross adenopathy. No free fluid in the pelvis. Urinary bladder not well distended and appears grossly normal. Open left inguinal ring contains fat only. No abdominal aortic aneurysm.     Small bowel obstruction with abnormal distal ileal loops. Appearance suspicious for Crohn's disease and clinical correlation is recommended. Other findings as above. See body of report for full details. Electronically signed by:  Willi Bosch MD  7/20/2022 1:33 PM CDT Workstation: [id number]                                         MDM    Final diagnoses:   Other specified intestinal obstruction, unspecified whether partial or complete (HCC)       ED Disposition  ED Disposition     ED Disposition   Decision to Admit    Condition   --    Comment   Level of Care: Med/Surg [1]   Diagnosis: Other specified intestinal obstruction, unspecified whether partial or complete (HCC) [[id number]]   Admitting Physician: ONEIDA BACA [[id number]]   Attending Physician: ONEIDA BACA [[id number]]               No  follow-up provider specified.       Medication List      No changes were made to your prescriptions during this visit.          Abby Parsons, APRN  07/20/22 [id number]

## 2022-07-20 NOTE — H&P
[facility] Medicine Admission      Date of Admission: 7/20/2022      Primary Care Physician: Valeria Tamayo APRN      Chief Complaint: Diarrhea, nausea and abdominal pain    HPI: This is a 41 year old male with PMH of depression and drug use that presented to [facility] on 7/20/2022 with complaints of diarrhea, nausea and abdominal pain.  No other sick contacts.  Patient reports no chronic issues with diarrhea. No previous abdominal surgery.     CT of the abdomen and pelvis indicates a small bowel obstruction with abnormal distal ileal loops.      Concurrent Medical History:  has a past medical history of Acute bronchitis, Anxiety, Cluster headache (12/02/2015), Conjunctivitis (08/26/2014), Depression, Dorsalgia, Malaise, Panic disorder, and Shoulder pain.    Past Surgical History:  has a past surgical history that includes Injection of Medication (12/02/2015) and Neck surgery (12/18/2016).    Family History: family history includes Cancer in his maternal grandfather; Diabetes in his maternal grandfather.    Social History:  reports that he has been smoking cigarettes. He has been smoking about 0.50 packs per day. He has never used smokeless tobacco. He reports current drug use. Drug: Methamphetamines. He reports that he does not drink alcohol.    Allergies: No Known Allergies    Medications:   Prior to Admission medications    Medication Sig Start Date End Date Taking? Authorizing Provider   baclofen (LIORESAL) 10 MG tablet Take 0.5 tablets by mouth 3 (Three) Times a Day. 7/20/17   Valeria Tamayo APRN   DULoxetine (CYMBALTA) 60 MG capsule Take 1 capsule by mouth Daily. 4/18/18   Devan Riggins MD   gabapentin (NEURONTIN) 300 MG capsule Take 1 capsule by mouth 3 (Three) Times a Day. 4/17/18   Devan Riggins MD   melatonin 3 MG tablet Take 2 tablets by mouth Every Night. 4/17/18   Devan Riggins MD       Review of Systems:  Review of Systems   Constitutional: Negative for  activity change and fatigue.   HENT: Negative for ear pain and sore throat.    Eyes: Negative for pain and discharge.   Respiratory: Negative for cough and shortness of breath.    Cardiovascular: Negative for chest pain and palpitations.   Gastrointestinal: Positive for abdominal pain, diarrhea and nausea.   Endocrine: Negative for cold intolerance and heat intolerance.   Genitourinary: Negative for difficulty urinating and dysuria.   Musculoskeletal: Negative for arthralgias and gait problem.   Skin: Negative for color change and rash.   Neurological: Negative for dizziness and weakness.   Psychiatric/Behavioral: Negative for agitation and confusion.      Otherwise complete ROS is negative except as mentioned above.    Physical Exam:   Temp:  [97.9 °F (36.6 °C)] 97.9 °F (36.6 °C)  Heart Rate:  [] 86  Resp:  [18-20] 18  BP: (130-139)/(79-83) 134/83  Physical Exam  Constitutional:       Appearance: He is well-developed.   HENT:      Head: Normocephalic and atraumatic.   Eyes:      Pupils: Pupils are equal, round, and reactive to light.   Cardiovascular:      Rate and Rhythm: Normal rate and regular rhythm.   Pulmonary:      Effort: Pulmonary effort is normal.      Breath sounds: Normal breath sounds.   Abdominal:      General: Bowel sounds are normal.      Palpations: Abdomen is soft.   Musculoskeletal:         General: Normal range of motion.      Cervical back: Normal range of motion and neck supple.   Skin:     General: Skin is warm and dry.   Neurological:      Mental Status: He is alert and oriented to person, place, and time.   Psychiatric:         Behavior: Behavior normal.       Results Reviewed:  I have personally reviewed current lab, radiology, and data and agree with results.  Lab Results (last 24 hours)     Procedure Component Value Units Date/Time    Urinalysis With Microscopic If Indicated (No Culture) - Urine, Clean Catch [[id number]]  (Abnormal) Collected: 07/20/22 1445    Specimen: Urine, Clean  Catch Updated: 07/20/22 1458     Color, UA Yellow     Appearance, UA Clear     pH, UA <=5.0     Specific Gravity, UA 1.070     Comment: Result obtained by Refractometer        Glucose, UA Negative     Ketones, UA 15 mg/dL (1+)     Bilirubin, UA Negative     Blood, UA Negative     Protein, UA Trace     Leuk Esterase, UA Negative     Nitrite, UA Negative     Urobilinogen, UA 1.0 E.U./dL    Narrative:      Urine microscopic not indicated.    Comprehensive Metabolic Panel [[id number]]  (Abnormal) Collected: 07/20/22 1404    Specimen: Blood Updated: 07/20/22 1452     Glucose 108 mg/dL      BUN 11 mg/dL      Creatinine 0.86 mg/dL      Sodium 135 mmol/L      Potassium 4.8 mmol/L      Comment: Slight hemolysis detected by analyzer. Results may be affected.        Chloride 101 mmol/L      CO2 21.0 mmol/L      Calcium 8.7 mg/dL      Total Protein 7.7 g/dL      Albumin 4.00 g/dL      ALT (SGPT) 19 U/L      AST (SGOT) 19 U/L      Alkaline Phosphatase 96 U/L      Total Bilirubin 0.4 mg/dL      Globulin 3.7 gm/dL      A/G Ratio 1.1 g/dL      BUN/Creatinine Ratio 12.8     Anion Gap 13.0 mmol/L      eGFR 111.6 mL/min/1.73      Comment: National Kidney Foundation and American Society of Nephrology (ASN) Task Force recommended calculation based on the Chronic Kidney Disease Epidemiology Collaboration (CKD-EPI) equation refit without adjustment for race.       Narrative:      GFR Normal >60  Chronic Kidney Disease <60  Kidney Failure <15      COVID-19 and FLU A/B PCR - Swab, Nasopharynx [[id number]]  (Normal) Collected: 07/20/22 1347    Specimen: Swab from Nasopharynx Updated: 07/20/22 1421     COVID19 Not Detected     Influenza A PCR Not Detected     Influenza B PCR Not Detected    Narrative:      Fact sheet for providers: https://www.fda.gov/media/198217/download    Fact sheet for patients: https://www.fda.gov/media/975523/download    Test performed by PCR.    Lipase [[id number]]  (Normal) Collected: 07/20/22 1233    Specimen: Blood  Updated: 07/20/22 1318     Lipase 14 U/L     Magnesium [[id number]]  (Normal) Collected: 07/20/22 1233    Specimen: Blood Updated: 07/20/22 1318     Magnesium 2.4 mg/dL     Lactic Acid, Plasma [[id number]]  (Normal) Collected: 07/20/22 1233    Specimen: Blood Updated: 07/20/22 1311     Lactate 1.4 mmol/L     CBC & Differential [[id number]]  (Abnormal) Collected: 07/20/22 1233    Specimen: Blood Updated: 07/20/22 1244    Narrative:      The following orders were created for panel order CBC & Differential.  Procedure                               Abnormality         Status                     ---------                               -----------         ------                     CBC Auto Differential[[id number]]        Abnormal            Final result                 Please view results for these tests on the individual orders.    CBC Auto Differential [[id number]]  (Abnormal) Collected: 07/20/22 1233    Specimen: Blood Updated: 07/20/22 1244     WBC 16.63 10*3/mm3      RBC 6.14 10*6/mm3      Hemoglobin 16.9 g/dL      Hematocrit 51.9 %      MCV 84.5 fL      MCH 27.5 pg      MCHC 32.6 g/dL      RDW 14.1 %      RDW-SD 42.5 fl      MPV 8.9 fL      Platelets 359 10*3/mm3      Neutrophil % 85.9 %      Lymphocyte % 8.4 %      Monocyte % 3.8 %      Eosinophil % 0.8 %      Basophil % 0.4 %      Immature Grans % 0.7 %      Neutrophils, Absolute 14.29 10*3/mm3      Lymphocytes, Absolute 1.39 10*3/mm3      Monocytes, Absolute 0.64 10*3/mm3      Eosinophils, Absolute 0.14 10*3/mm3      Basophils, Absolute 0.06 10*3/mm3      Immature Grans, Absolute 0.11 10*3/mm3      nRBC 0.0 /100 WBC         Imaging Results (Last 24 Hours)     Procedure Component Value Units Date/Time    CT Abdomen Pelvis With Contrast [[id number]] Collected: 07/20/22 1301     Updated: 07/20/22 1335    Narrative:      CT abdomen and pelvis with IV contrast July 20, 2022    INDICATION: Right-sided pain with distention    TECHNIQUE: Spiral images obtained from diaphragm  through rectum  following intravenous administration of 90 mL of Isovue-300.  Sagittal, axial and coronal reformatted images generated  retrospectively. Oral contrast was not administered prior to  imaging.    This exam was performed according to our departmental  dose-optimization program, which includes automated exposure  control, adjustment of the mA and/or kV according to patient size  and/or use of iterative reconstruction technique.      FINDINGS:  Liver grossly normal.  Spleen normal.  Cholelithiasis without cholecystitis.  Pancreas grossly normal.  Adrenals normal.  No acute or worrisome focal renal pathology.  Distended stomach contains fluid and what I suspect is recently  ingested material.  There is small bowel dilatation with air-fluid levels extending  from mid jejunum to the level of the mid to distal ileum where  there are two abnormal small bowel loops present. More distal  ileal loop with an approximately 8.6 cm long segment with  thickening of the wall. There is a os 7.5 cm narrowing of the  adjacent ileal loop. Prominent fat in the mesentery   these.  Appendix normal.  No masses or fluid collections.  No gross adenopathy.  No free fluid in the pelvis.  Urinary bladder not well distended and appears grossly normal.  Open left inguinal ring contains fat only.  No abdominal aortic aneurysm.      Impression:      Small bowel obstruction with abnormal distal ileal loops.  Appearance suspicious for Crohn's disease and clinical  correlation is recommended.  Other findings as above. See body of report for full details.    Electronically signed by:  Willi Bosch MD  7/20/2022 1:33 PM  CDT Workstation: [id number]            Assessment:    Active Hospital Problems    Diagnosis    • Intestinal obstruction (HCC)          Plan:  1.  Small bowel obstruction:  General surgery consult appreciated.  Antiemetics and pain control.  Place NGT if further emesis.  Encourage ambulation. NPO.  IV Protonix.    2.  Anxiety/depression:  Continue home Cymbalta.     DVT PPx:  SCDs.     I discussed the patient's findings and my recommendations with: Patient    I confirmed that the patient's Advance Care Plan is present, code status is documented, or surrogate decision maker is listed in the patient's medical record.      I have utilized all available immediate resources to obtain, update, or review the patient's current medications.          This document has been electronically signed by REG Albright on July 20, 2022 15:03 CDT

## 2022-07-21 ENCOUNTER — APPOINTMENT (OUTPATIENT)
Dept: GENERAL RADIOLOGY | Facility: HOSPITAL | Age: 42
End: 2022-07-21

## 2022-07-21 ENCOUNTER — APPOINTMENT (OUTPATIENT)
Dept: INTERVENTIONAL RADIOLOGY/VASCULAR | Facility: HOSPITAL | Age: 42
End: 2022-07-21

## 2022-07-21 ENCOUNTER — APPOINTMENT (OUTPATIENT)
Dept: ULTRASOUND IMAGING | Facility: HOSPITAL | Age: 42
End: 2022-07-21

## 2022-07-21 PROBLEM — K56.609 INTESTINAL OBSTRUCTION (HCC): Status: RESOLVED | Noted: 2022-07-20 | Resolved: 2022-07-21

## 2022-07-21 LAB
ANION GAP SERPL CALCULATED.3IONS-SCNC: 12 MMOL/L (ref 5–15)
BASOPHILS # BLD AUTO: 0.05 10*3/MM3 (ref 0–0.2)
BASOPHILS NFR BLD AUTO: 0.4 % (ref 0–1.5)
BUN SERPL-MCNC: 10 MG/DL (ref 6–20)
BUN/CREAT SERPL: 11.6 (ref 7–25)
CALCIUM SPEC-SCNC: 8.5 MG/DL (ref 8.6–10.5)
CHLORIDE SERPL-SCNC: 102 MMOL/L (ref 98–107)
CO2 SERPL-SCNC: 24 MMOL/L (ref 22–29)
CREAT SERPL-MCNC: 0.86 MG/DL (ref 0.76–1.27)
DEPRECATED RDW RBC AUTO: 43.6 FL (ref 37–54)
EGFRCR SERPLBLD CKD-EPI 2021: 111.6 ML/MIN/1.73
EOSINOPHIL # BLD AUTO: 0.16 10*3/MM3 (ref 0–0.4)
EOSINOPHIL NFR BLD AUTO: 1.3 % (ref 0.3–6.2)
ERYTHROCYTE [DISTWIDTH] IN BLOOD BY AUTOMATED COUNT: 14.1 % (ref 12.3–15.4)
GLUCOSE SERPL-MCNC: 111 MG/DL (ref 65–99)
HCT VFR BLD AUTO: 44.8 % (ref 37.5–51)
HGB BLD-MCNC: 14.5 G/DL (ref 13–17.7)
IMM GRANULOCYTES # BLD AUTO: 0.06 10*3/MM3 (ref 0–0.05)
IMM GRANULOCYTES NFR BLD AUTO: 0.5 % (ref 0–0.5)
LYMPHOCYTES # BLD AUTO: 2.48 10*3/MM3 (ref 0.7–3.1)
LYMPHOCYTES NFR BLD AUTO: 20.7 % (ref 19.6–45.3)
MCH RBC QN AUTO: 27.8 PG (ref 26.6–33)
MCHC RBC AUTO-ENTMCNC: 32.4 G/DL (ref 31.5–35.7)
MCV RBC AUTO: 85.8 FL (ref 79–97)
MONOCYTES # BLD AUTO: 0.78 10*3/MM3 (ref 0.1–0.9)
MONOCYTES NFR BLD AUTO: 6.5 % (ref 5–12)
NEUTROPHILS NFR BLD AUTO: 70.6 % (ref 42.7–76)
NEUTROPHILS NFR BLD AUTO: 8.46 10*3/MM3 (ref 1.7–7)
NRBC BLD AUTO-RTO: 0 /100 WBC (ref 0–0.2)
PLATELET # BLD AUTO: 237 10*3/MM3 (ref 140–450)
PMV BLD AUTO: 9.7 FL (ref 6–12)
POTASSIUM SERPL-SCNC: 4 MMOL/L (ref 3.5–5.2)
RBC # BLD AUTO: 5.22 10*6/MM3 (ref 4.14–5.8)
RBC MORPH BLD: NORMAL
SMALL PLATELETS BLD QL SMEAR: ADEQUATE
SODIUM SERPL-SCNC: 138 MMOL/L (ref 136–145)
WBC MORPH BLD: NORMAL
WBC NRBC COR # BLD: 11.99 10*3/MM3 (ref 3.4–10.8)

## 2022-07-21 PROCEDURE — 80048 BASIC METABOLIC PNL TOTAL CA: CPT | Performed by: NURSE PRACTITIONER

## 2022-07-21 PROCEDURE — C1751 CATH, INF, PER/CENT/MIDLINE: HCPCS

## 2022-07-21 PROCEDURE — 85007 BL SMEAR W/DIFF WBC COUNT: CPT | Performed by: NURSE PRACTITIONER

## 2022-07-21 PROCEDURE — 96361 HYDRATE IV INFUSION ADD-ON: CPT

## 2022-07-21 PROCEDURE — 96376 TX/PRO/DX INJ SAME DRUG ADON: CPT

## 2022-07-21 PROCEDURE — 99213 OFFICE O/P EST LOW 20 MIN: CPT | Performed by: SURGERY

## 2022-07-21 PROCEDURE — 25010000002 ONDANSETRON PER 1 MG: Performed by: NURSE PRACTITIONER

## 2022-07-21 PROCEDURE — G0378 HOSPITAL OBSERVATION PER HR: HCPCS

## 2022-07-21 PROCEDURE — 36410 VNPNXR 3YR/> PHY/QHP DX/THER: CPT

## 2022-07-21 PROCEDURE — 96375 TX/PRO/DX INJ NEW DRUG ADDON: CPT

## 2022-07-21 PROCEDURE — 74022 RADEX COMPL AQT ABD SERIES: CPT

## 2022-07-21 PROCEDURE — 25010000002 MORPHINE PER 10 MG: Performed by: NURSE PRACTITIONER

## 2022-07-21 PROCEDURE — 76937 US GUIDE VASCULAR ACCESS: CPT

## 2022-07-21 PROCEDURE — 85025 COMPLETE CBC W/AUTO DIFF WBC: CPT | Performed by: NURSE PRACTITIONER

## 2022-07-21 RX ORDER — SODIUM CHLORIDE 9 MG/ML
INJECTION, SOLUTION INTRAVENOUS
Status: DISCONTINUED
Start: 2022-07-21 | End: 2022-07-22 | Stop reason: HOSPADM

## 2022-07-21 RX ORDER — NICOTINE 21 MG/24HR
1 PATCH, TRANSDERMAL 24 HOURS TRANSDERMAL
Status: DISCONTINUED | OUTPATIENT
Start: 2022-07-21 | End: 2022-07-22 | Stop reason: HOSPADM

## 2022-07-21 RX ORDER — SODIUM CHLORIDE 9 MG/ML
125 INJECTION, SOLUTION INTRAVENOUS CONTINUOUS
Status: DISCONTINUED | OUTPATIENT
Start: 2022-07-21 | End: 2022-07-22 | Stop reason: HOSPADM

## 2022-07-21 RX ADMIN — SODIUM CHLORIDE 125 ML/HR: 9 INJECTION, SOLUTION INTRAVENOUS at 20:03

## 2022-07-21 RX ADMIN — MORPHINE SULFATE 1 MG: 2 INJECTION, SOLUTION INTRAMUSCULAR; INTRAVENOUS at 07:36

## 2022-07-21 RX ADMIN — ONDANSETRON 4 MG: 2 INJECTION INTRAMUSCULAR; INTRAVENOUS at 07:36

## 2022-07-21 RX ADMIN — MORPHINE SULFATE 1 MG: 2 INJECTION, SOLUTION INTRAMUSCULAR; INTRAVENOUS at 20:10

## 2022-07-21 RX ADMIN — MELATONIN 6 MG: 3 TAB ORAL at 20:03

## 2022-07-21 RX ADMIN — PANTOPRAZOLE SODIUM 40 MG: 40 INJECTION, POWDER, FOR SOLUTION INTRAVENOUS at 06:16

## 2022-07-21 RX ADMIN — SODIUM CHLORIDE 125 ML/HR: 9 INJECTION, SOLUTION INTRAVENOUS at 09:27

## 2022-07-21 RX ADMIN — Medication 10 ML: at 20:03

## 2022-07-21 RX ADMIN — NICOTINE 1 PATCH: 21 PATCH, EXTENDED RELEASE TRANSDERMAL at 20:03

## 2022-07-21 NOTE — PROGRESS NOTES
TWO PATIENT IDENTIFIERS WERE USED. THE PATIENT WAS DRAPED WITH A FULL BODY DRAPE AND THE PATIENT'S RIGHT ARM WAS PREPPED WITH CHLORA PREP. ULTRASOUND WAS USED TO LOCALIZE THERIGHT BASILIC VEIN. SUBCUTANEOUS TISSUE AT THE CATHETER SITE WAS INFILTRATED WITH 2% LIDOCAINE. UNDER ULTRASOUND GUIDANCE, THE VEIN WAS ACCESSED WITH A 21 GAUGE  NEEDLE. AN 0.018 WIRE WAS THEN THREADED THROUGH THE NEEDLE. THE 21 GAUGE NEEDLE WAS REMOVED AND A 4 Sinhala SHEATH WAS PLACED OVER THE WIRE INTO THE VEIN.THE MIDLINE CATHETER WAS TRIMMED TO 20CM. THE MIDLINE CATHETER WAS THEN PLACED OVER THE WIRE INTO THE VEIN, THE SHEATH WAS PEELED AWAY, WIRE WAS REMOVED. CATHETER WAS FLUSHED WITH NORMAL SALINE AND CATHETER TIP APPLIED. BIOPATCH PLACED. CATHETER SECURED WITH STAT LOCK AND TEGADERM. PATIENT TOLERATED PROCEDURE WELL. THIS WAS DONE IN THE ANGIOSUITE      IMPRESSION:SUCCESSFUL PLACEMENT OF DUAL LUMEN MIDLINE.           Alejandro Marinelli  7/21/2022  15:07 CDT

## 2022-07-21 NOTE — PLAN OF CARE
Goal Outcome Evaluation:               Arrived to floor, NPO, resting between care, education on nauseau and NG tube, CXR today

## 2022-07-21 NOTE — PROGRESS NOTES
[facility] Medicine Services  INPATIENT PROGRESS NOTE    Length of Stay: 0  Date of Admission: 7/20/2022  Primary Care Physician: Valeria Tamayo APRN    Subjective   Chief Complaint: Nausea  HPI:  41 year old male with a history of polysubstance abuse who presented with nausea and abdominal pain.  CT of the abdomen demonstrated findings consistent with small bowel obstruction.  General surgery consulted and recommended NPO, IV fluid, and NG placement should vomiting continue.  He has had no vomiting since presentation.  He states he is hungry and passing flatus.  Repeat imaging demonstrates non-dilated air filled loops of small bowel and gas throughout the colon.     Review of Systems   Constitutional: Negative for chills and fever.   Respiratory: Negative for cough and shortness of breath.    Cardiovascular: Negative for chest pain and leg swelling.   Gastrointestinal: Negative for abdominal pain, diarrhea, nausea and vomiting.        All pertinent negatives and positives are as above. All other systems have been reviewed and are negative unless otherwise stated.     Objective    Temp:  [96.8 °F (36 °C)-99 °F (37.2 °C)] 96.8 °F (36 °C)  Heart Rate:  [] 94  Resp:  [16-18] 18  BP: (106-134)/(62-83) 127/70    Physical Exam  Vitals reviewed.   Constitutional:       General: He is not in acute distress.     Appearance: Normal appearance.   HENT:      Head: Normocephalic and atraumatic.   Cardiovascular:      Rate and Rhythm: Normal rate and regular rhythm.   Pulmonary:      Effort: Pulmonary effort is normal. No respiratory distress.      Breath sounds: Normal breath sounds.   Abdominal:      General: There is no distension.      Palpations: Abdomen is soft.      Tenderness: There is no abdominal tenderness. There is no guarding.   Musculoskeletal:         General: No swelling or deformity.   Skin:     General: Skin is warm and dry.   Neurological:       General: No focal deficit present.      Mental Status: He is alert and oriented to person, place, and time.         Results Review:  I have reviewed the labs, radiology results, and diagnostic studies.    Laboratory Data:   Results from last 7 days   Lab Units 07/21/22  0237 07/20/22  1404   SODIUM mmol/L 138 135*   POTASSIUM mmol/L 4.0 4.8   CHLORIDE mmol/L 102 101   CO2 mmol/L 24.0 21.0*   BUN mg/dL 10 11   CREATININE mg/dL 0.86 0.86   GLUCOSE mg/dL 111* 108*   CALCIUM mg/dL 8.5* 8.7   BILIRUBIN mg/dL  --  0.4   ALK PHOS U/L  --  96   ALT (SGPT) U/L  --  19   AST (SGOT) U/L  --  19   ANION GAP mmol/L 12.0 13.0     Estimated Creatinine Clearance: 135.4 mL/min (by C-G formula based on SCr of 0.86 mg/dL).  Results from last 7 days   Lab Units 07/20/22  1233   MAGNESIUM mg/dL 2.4         Results from last 7 days   Lab Units 07/21/22  0237 07/20/22  1233   WBC 10*3/mm3 11.99* 16.63*   HEMOGLOBIN g/dL 14.5 16.9   HEMATOCRIT % 44.8 51.9*   PLATELETS 10*3/mm3 237 359           Culture Data:   No results found for: BLOODCX  No results found for: URINECX  No results found for: RESPCX  No results found for: WOUNDCX  No results found for: STOOLCX  No components found for: BODYFLD    Radiology Data:   Imaging Results (Last 24 Hours)     Procedure Component Value Units Date/Time    XR Abdomen 2+ VW with Chest 1 VW [[id number]] Collected: 07/21/22 1039     Updated: 07/21/22 1059    Narrative:      Acute Abdominal Series Withe Upright Chest.    History: Follow-up small bowel obstruction.    Upright frontal film of the chest and supine and upright films of  the abdomen were obtained.    Comparison: None. Correlation CT July 20, 2022.    FINDINGS:    The lungs are clear of an acute process.  The heart is not enlarged.  The pulmonary vasculature is not increased.  No pleural effusion.  No pneumothorax.  No acute osseous abnormality.  Internal fixation plate and screw device cervical spine.    No free air.  Nondilated air-filled loops  of small bowel.  Gas throughout the colon.  Nonspecific air-fluid levels in the small and large bowel.  No mechanical bowel obstruction.  No organomegaly.  Pelvic phlebolith.  No acute osseous abnormality.      Impression:      Conclusion:  No acute disease in the chest.  Nondilated air-filled loops of small bowel.  Gas throughout the colon.  Nonspecific air-fluid levels in the small and large bowel.  No mechanical bowel obstruction.    [id number]    Electronically signed by:  Uriah Cutler MD  7/21/2022 10:56 AM  CDT Workstation: [id number]          I have reviewed the patient's current medications.     Assessment/Plan     Active Hospital Problems    Diagnosis    • SBO (small bowel obstruction) (HCC)      Resolving SBO vs more likely acute gastroenteritis  Polysubstance abuse      Plan:    NPO  IV fluid:  ml/hr  Pain control: PRN Morphine  Antiemetics: PRN Zofran  General surgery consultation appreciated  VTE PPx: SCD    I confirmed that the patient's Advance Care Plan is present, code status is documented, or surrogate decision maker is listed in the patient's medical record.     The patient was evaluated during the global COVID-19 pandemic, and the diagnosis was suspected/considered upon their initial presentation.  Evaluation, treatment, and testing were consistent with current guidelines for patients who present with complaints or symptoms that may be related to COVID-19.          This document has been electronically signed by REG Bailey on July 21, 2022 13:46 CDT

## 2022-07-22 ENCOUNTER — READMISSION MANAGEMENT (OUTPATIENT)
Dept: CALL CENTER | Facility: HOSPITAL | Age: 42
End: 2022-07-22

## 2022-07-22 VITALS
WEIGHT: 183 LBS | TEMPERATURE: 97 F | OXYGEN SATURATION: 96 % | BODY MASS INDEX: 27.11 KG/M2 | SYSTOLIC BLOOD PRESSURE: 129 MMHG | DIASTOLIC BLOOD PRESSURE: 79 MMHG | HEART RATE: 75 BPM | RESPIRATION RATE: 17 BRPM | HEIGHT: 69 IN

## 2022-07-22 LAB
ANION GAP SERPL CALCULATED.3IONS-SCNC: 8 MMOL/L (ref 5–15)
BASOPHILS # BLD AUTO: 0.04 10*3/MM3 (ref 0–0.2)
BASOPHILS NFR BLD AUTO: 0.5 % (ref 0–1.5)
BUN SERPL-MCNC: 8 MG/DL (ref 6–20)
BUN/CREAT SERPL: 10.1 (ref 7–25)
CALCIUM SPEC-SCNC: 8.6 MG/DL (ref 8.6–10.5)
CHLORIDE SERPL-SCNC: 107 MMOL/L (ref 98–107)
CO2 SERPL-SCNC: 24 MMOL/L (ref 22–29)
CREAT SERPL-MCNC: 0.79 MG/DL (ref 0.76–1.27)
DEPRECATED RDW RBC AUTO: 43 FL (ref 37–54)
EGFRCR SERPLBLD CKD-EPI 2021: 114.5 ML/MIN/1.73
EOSINOPHIL # BLD AUTO: 0.13 10*3/MM3 (ref 0–0.4)
EOSINOPHIL NFR BLD AUTO: 1.8 % (ref 0.3–6.2)
ERYTHROCYTE [DISTWIDTH] IN BLOOD BY AUTOMATED COUNT: 13.7 % (ref 12.3–15.4)
GLUCOSE SERPL-MCNC: 77 MG/DL (ref 65–99)
HCT VFR BLD AUTO: 40.4 % (ref 37.5–51)
HGB BLD-MCNC: 13 G/DL (ref 13–17.7)
IMM GRANULOCYTES # BLD AUTO: 0.03 10*3/MM3 (ref 0–0.05)
IMM GRANULOCYTES NFR BLD AUTO: 0.4 % (ref 0–0.5)
LYMPHOCYTES # BLD AUTO: 2.68 10*3/MM3 (ref 0.7–3.1)
LYMPHOCYTES NFR BLD AUTO: 36.6 % (ref 19.6–45.3)
MCH RBC QN AUTO: 27.8 PG (ref 26.6–33)
MCHC RBC AUTO-ENTMCNC: 32.2 G/DL (ref 31.5–35.7)
MCV RBC AUTO: 86.3 FL (ref 79–97)
MONOCYTES # BLD AUTO: 0.57 10*3/MM3 (ref 0.1–0.9)
MONOCYTES NFR BLD AUTO: 7.8 % (ref 5–12)
NEUTROPHILS NFR BLD AUTO: 3.88 10*3/MM3 (ref 1.7–7)
NEUTROPHILS NFR BLD AUTO: 52.9 % (ref 42.7–76)
NRBC BLD AUTO-RTO: 0 /100 WBC (ref 0–0.2)
PLATELET # BLD AUTO: 248 10*3/MM3 (ref 140–450)
PMV BLD AUTO: 9 FL (ref 6–12)
POTASSIUM SERPL-SCNC: 3.6 MMOL/L (ref 3.5–5.2)
RBC # BLD AUTO: 4.68 10*6/MM3 (ref 4.14–5.8)
SODIUM SERPL-SCNC: 139 MMOL/L (ref 136–145)
WBC NRBC COR # BLD: 7.33 10*3/MM3 (ref 3.4–10.8)

## 2022-07-22 PROCEDURE — 25010000002 MORPHINE PER 10 MG: Performed by: NURSE PRACTITIONER

## 2022-07-22 PROCEDURE — 80048 BASIC METABOLIC PNL TOTAL CA: CPT | Performed by: NURSE PRACTITIONER

## 2022-07-22 PROCEDURE — 85025 COMPLETE CBC W/AUTO DIFF WBC: CPT | Performed by: NURSE PRACTITIONER

## 2022-07-22 PROCEDURE — G0378 HOSPITAL OBSERVATION PER HR: HCPCS

## 2022-07-22 PROCEDURE — 96376 TX/PRO/DX INJ SAME DRUG ADON: CPT

## 2022-07-22 PROCEDURE — 25010000002 ONDANSETRON PER 1 MG: Performed by: NURSE PRACTITIONER

## 2022-07-22 PROCEDURE — 36415 COLL VENOUS BLD VENIPUNCTURE: CPT | Performed by: NURSE PRACTITIONER

## 2022-07-22 PROCEDURE — 99213 OFFICE O/P EST LOW 20 MIN: CPT | Performed by: SURGERY

## 2022-07-22 PROCEDURE — 96361 HYDRATE IV INFUSION ADD-ON: CPT

## 2022-07-22 RX ADMIN — MORPHINE SULFATE 1 MG: 2 INJECTION, SOLUTION INTRAMUSCULAR; INTRAVENOUS at 00:23

## 2022-07-22 RX ADMIN — Medication 10 ML: at 08:12

## 2022-07-22 RX ADMIN — ONDANSETRON 4 MG: 2 INJECTION INTRAMUSCULAR; INTRAVENOUS at 06:47

## 2022-07-22 RX ADMIN — PANTOPRAZOLE SODIUM 40 MG: 40 INJECTION, POWDER, FOR SOLUTION INTRAVENOUS at 05:59

## 2022-07-22 RX ADMIN — MORPHINE SULFATE 1 MG: 2 INJECTION, SOLUTION INTRAMUSCULAR; INTRAVENOUS at 04:14

## 2022-07-22 RX ADMIN — SODIUM CHLORIDE 125 ML/HR: 9 INJECTION, SOLUTION INTRAVENOUS at 04:14

## 2022-07-22 NOTE — DISCHARGE SUMMARY
[facility] Medicine Services  DISCHARGE SUMMARY       Date of Admission: 7/20/2022  Date of Discharge:  7/22/2022  Primary Care Physician: Valeria Tamayo APRN    Presenting Problem/History of Present Illness:  Other specified intestinal obstruction, unspecified whether partial or complete (HCC) [K56.699]       Final Discharge Diagnoses:    SBO (small bowel obstruction) (HCC)    Acute gastroenteritis    Consults:   Consults     Date and Time Order Name Status Description    7/20/2022  1:42 PM Surgery (on-call MD unless specified) Completed                         Pertinent Test Results:   XR Abdomen 2+ VW with Chest 1 VW    Result Date: 7/21/2022  Acute Abdominal Series Withe Upright Chest. History: Follow-up small bowel obstruction. Upright frontal film of the chest and supine and upright films of the abdomen were obtained. Comparison: None. Correlation CT July 20, 2022. FINDINGS:  The lungs are clear of an acute process. The heart is not enlarged. The pulmonary vasculature is not increased. No pleural effusion. No pneumothorax. No acute osseous abnormality. Internal fixation plate and screw device cervical spine. No free air. Nondilated air-filled loops of small bowel. Gas throughout the colon. Nonspecific air-fluid levels in the small and large bowel. No mechanical bowel obstruction. No organomegaly. Pelvic phlebolith. No acute osseous abnormality.     Conclusion: No acute disease in the chest. Nondilated air-filled loops of small bowel. Gas throughout the colon. Nonspecific air-fluid levels in the small and large bowel. No mechanical bowel obstruction. [id number] Electronically signed by:  Uriah Cutler MD  7/21/2022 10:56 AM CDT Workstation: [id number]    US Guided Vascular Access    Result Date: 7/21/2022  ULTRASOUND-GUIDED VASCULAR ACCESS INDICATION: iv access, K56.699 Other intestinal obstruction unspecified as to partial versus complete obstruction  TECHNIQUE: Real-time ultrasound imaging was utilized to visualize needle entry. FINDINGS: Realtime ultrasound imaging was utilized to visualize needle entry into a patent right basilic vein during midline catheter placement and a permanent image was stored for permanent recording and reporting.     Imaging obtained during vascular access device placement under ultrasound guidance. Electronically signed by:  Laura Lo MD  7/21/2022 3:26 PM CDT Workstation: [id number]    CT Abdomen Pelvis With Contrast    Result Date: 7/20/2022  CT abdomen and pelvis with IV contrast July 20, 2022 INDICATION: Right-sided pain with distention TECHNIQUE: Spiral images obtained from diaphragm through rectum following intravenous administration of 90 mL of Isovue-300. Sagittal, axial and coronal reformatted images generated retrospectively. Oral contrast was not administered prior to imaging. This exam was performed according to our departmental dose-optimization program, which includes automated exposure control, adjustment of the mA and/or kV according to patient size and/or use of iterative reconstruction technique. FINDINGS: Liver grossly normal. Spleen normal. Cholelithiasis without cholecystitis. Pancreas grossly normal. Adrenals normal. No acute or worrisome focal renal pathology. Distended stomach contains fluid and what I suspect is recently ingested material. There is small bowel dilatation with air-fluid levels extending from mid jejunum to the level of the mid to distal ileum where there are two abnormal small bowel loops present. More distal ileal loop with an approximately 8.6 cm long segment with thickening of the wall. There is a os 7.5 cm narrowing of the adjacent ileal loop. Prominent fat in the mesentery  these. Appendix normal. No masses or fluid collections. No gross adenopathy. No free fluid in the pelvis. Urinary bladder not well distended and appears grossly normal. Open left inguinal ring contains fat  "only. No abdominal aortic aneurysm.     Small bowel obstruction with abnormal distal ileal loops. Appearance suspicious for Crohn's disease and clinical correlation is recommended. Other findings as above. See body of report for full details. Electronically signed by:  Willi Bosch MD  7/20/2022 1:33 PM CDT Workstation: [id number]    IR Insert Midline Without Port Pump 5 Plus    Result Date: 7/21/2022  This procedure was auto-finalized with no dictation required.      HPI/Hospital Course:  The patient is a 41 y.o. male with a history of polysubstance abuse who presented to [facility] with nausea and abdominal pain.  CT of the abdomen demonstrated findings consistent with small bowel obstruction.  General surgery consulted and recommended NPO, IV fluid, and NG placement should vomiting continue.  He improved and symptoms resolved. Repeat imaging demonstrated resolution.  He is tolerating regular diet and wishes to be discharged to home.  He is discharged to home in stable condition.     Condition on Discharge:  Stable    Physical Exam on Discharge:  /79   Pulse 75   Temp 97 °F (36.1 °C)   Resp 17   Ht 175.3 cm (69\")   Wt 83 kg (183 lb)   SpO2 96%   BMI 27.02 kg/m²   Physical Exam  Vitals reviewed.   Constitutional:       Appearance: Normal appearance.   HENT:      Head: Normocephalic and atraumatic.   Cardiovascular:      Rate and Rhythm: Normal rate and regular rhythm.   Pulmonary:      Effort: Pulmonary effort is normal. No respiratory distress.      Breath sounds: Normal breath sounds.   Abdominal:      General: Bowel sounds are normal. There is no distension.      Palpations: Abdomen is soft.      Tenderness: There is no abdominal tenderness.   Musculoskeletal:         General: No swelling or deformity.   Skin:     General: Skin is warm and dry.   Neurological:      General: No focal deficit present.      Mental Status: He is alert and oriented to person, place, and time. "           Discharge Disposition:  Home or Self Care    Discharge Medications:     Discharge Medications      Continue These Medications      Instructions Start Date   melatonin 3 MG tablet   6 mg, Oral, Nightly             Discharge Diet:   Diet Instructions     Diet: Regular; Thin      Discharge Diet: Regular    Fluid Consistency: Thin          Activity at Discharge:   Activity Instructions     Activity as Tolerated              Follow-up Appointments:   1. PCP 1 week    REG Bailey  07/22/22  12:34 CDT

## 2022-07-22 NOTE — PROGRESS NOTES
GENERAL SURGERY PROGRESS NOTE     LOS: 0 days       Interval History:     No acute events overnight.  Patient endorses flatus and is hungry.  His abdominal pain has resolved.    Medication Review:   melatonin, 6 mg, Oral, Nightly  nicotine, 1 patch, Transdermal, Q24H  pantoprazole, 40 mg, Intravenous, Q AM  sodium chloride, 10 mL, Intravenous, Q12H  sodium chloride, , ,         sodium chloride, 125 mL/hr, Last Rate: 125 mL/hr (07/22/22 0626)    Objective     Vital Signs:  Temp:  [96.1 °F (35.6 °C)-97 °F (36.1 °C)] 97 °F (36.1 °C)  Heart Rate:  [72-94] 75  Resp:  [17-18] 17  BP: (108-129)/(62-79) 129/79    Intake/Output Summary (Last 24 hours) at 7/22/2022 0833  Last data filed at 7/21/2022 0945  Gross per 24 hour   Intake --   Output 150 ml   Net -150 ml       Physical Exam  Constitutional:       Appearance: Normal appearance.   HENT:      Head: Normocephalic and atraumatic.   Eyes:      Extraocular Movements: Extraocular movements intact.      Pupils: Pupils are equal, round, and reactive to light.   Cardiovascular:      Rate and Rhythm: Normal rate and regular rhythm.      Pulses: Normal pulses.      Heart sounds: Normal heart sounds.   Pulmonary:      Effort: Pulmonary effort is normal. No respiratory distress.   Abdominal:      Comments: Soft, nondistended, mildly tender to palpation in the right lower quadrant without rebound or guarding   Musculoskeletal:         General: No swelling or deformity.   Skin:     General: Skin is warm.      Coloration: Skin is not jaundiced.   Neurological:      General: No focal deficit present.      Mental Status: He is alert and oriented to person, place, and time.   Psychiatric:         Mood and Affect: Mood normal.         Behavior: Behavior normal.         Results Review:    Results from last 7 days   Lab Units 07/22/22  0634 07/21/22  0237 07/20/22  1404   SODIUM mmol/L 139 138 135*   POTASSIUM mmol/L 3.6 4.0 4.8   CHLORIDE mmol/L 107 102 101   CO2 mmol/L 24.0 24.0 21.0*    BUN mg/dL 8 10 11   CREATININE mg/dL 0.79 0.86 0.86   GLUCOSE mg/dL 77 111* 108*   CALCIUM mg/dL 8.6 8.5* 8.7     Results from last 7 days   Lab Units 07/22/22  0634 07/21/22  0237 07/20/22  1233   WBC 10*3/mm3 7.33 11.99* 16.63*   HEMOGLOBIN g/dL 13.0 14.5 16.9   HEMATOCRIT % 40.4 44.8 51.9*   PLATELETS 10*3/mm3 248 237 359       Assessment:    SBO (small bowel obstruction) (HCC)        Plan:    Okay for clear liquid diet and advance as tolerated.        This document has been electronically signed by Kelechi Sosa MD on July 22, 2022 08:33 CDT

## 2022-07-22 NOTE — OUTREACH NOTE
Prep Survey    Flowsheet Row Responses   [facility] facility patient discharged from? [place]   Is LACE score < 7 ? Yes   Emergency Room discharge w/ pulse ox? No   Eligibility [facility]   Date of Admission 07/20/22   Date of Discharge 07/22/22   Discharge Disposition Home or Self Care   Discharge diagnosis SBO   Does the patient have one of the following disease processes/diagnoses(primary or secondary)? Other   Does the patient have Home health ordered? No   Is there a DME ordered? No   Prep survey completed? Yes          CARA OROZCO - Registered Nurse

## 2022-07-25 ENCOUNTER — TRANSITIONAL CARE MANAGEMENT TELEPHONE ENCOUNTER (OUTPATIENT)
Dept: CALL CENTER | Facility: HOSPITAL | Age: 42
End: 2022-07-25

## 2022-07-25 NOTE — OUTREACH NOTE
Call Center TCM Note    Flowsheet Row Responses   [facility] patient discharged from? [place]   Does the patient have one of the following disease processes/diagnoses(primary or secondary)? Other   TCM attempt successful? No  [no verbal release]   Unsuccessful attempts Attempt 1  [attempted home and cell numbers]   Does the patient have a primary care provider?  Yes   Comments regarding PCP Hospital PCP FOLLOW UP APPOINTMENT IS 7/29/22@1100am          Cande Matson RN    7/25/2022, 11:31 EDT

## 2022-07-25 NOTE — OUTREACH NOTE
Call Center TCM Note    Flowsheet Row Responses   [facility] patient discharged from? [place]   Does the patient have one of the following disease processes/diagnoses(primary or secondary)? Other   TCM attempt successful? No   Unsuccessful attempts Attempt 2          Cande Matson RN    7/25/2022, 16:03 EDT

## 2022-07-26 ENCOUNTER — TRANSITIONAL CARE MANAGEMENT TELEPHONE ENCOUNTER (OUTPATIENT)
Dept: CALL CENTER | Facility: HOSPITAL | Age: 42
End: 2022-07-26

## 2022-07-26 NOTE — OUTREACH NOTE
Call Center TCM Note    Flowsheet Row Responses   [facility] patient discharged from? [place]   Does the patient have one of the following disease processes/diagnoses(primary or secondary)? Other   TCM attempt successful? No  [no verbal release]   Unsuccessful attempts Attempt 3   Comments regarding PCP Hospital PCP FOLLOW UP APPOINTMENT IS 7/29/22@1100am          Cande Matson RN    7/26/2022, 10:48 EDT

## 2024-05-01 NOTE — PROGRESS NOTES
"4/11/2018    Chief Complaint: psychosis    Subjective:  Patient is a 37 y.o. male who was hospitalized for psychosis.    He continues to think he is at [facility]. He knows it is April 2018. He notes he is \"hallucinogenic\" at night and in the morning. He rambles some odd and bizarre story when asked about why and when he came here. Spoke with Dr. Mcqueen about his wound to left upper arm. He has dressing over it. He is grabbing and pointing to various things not there. Staff notes he waxes and wanes and will have periods of lucidity. He acts as if he receives a cellphone call and then reaches out and grabs my pants.    Objective     Vital Signs    Temp:  [96.2 °F (35.7 °C)-97 °F (36.1 °C)] 96.2 °F (35.7 °C)  Heart Rate:  [101-108] 108  Resp:  [18] 18  BP: (102-112)/(59-64) 110/60    Physical Exam:   General Appearance: alert, appears stated age and cooperative,  Hygiene:   good  Gait & Station: Wheelchair  Musculoskeletal: No tremors or abnormal involuntary movements    Mental Status Exam:   Cooperation:  Cooperative  Eye Contact:  scattered  Psychomotor Behavior:  Appropriate  Mood: confused and at times euthymic  Affect:  odd with moments of smiling  Speech:  Normal  Thought Process:  at times clear and coherent at others illogical  Associations: often tangential  Thought Content:                At times bizarre and delusional   Suicidal:  None   Homicidal:  None   Hallucinations:  Auditory and Visual   Delusion:  Odd delusional thinking  Cognitive Functioning:   Consciousness: awake and alert and Orientation:  Person, Time and and he did not know the place and was confused about the situation  Reliability:  poor  Insight:  Poor  Judgement:  Poor  Impulse Control:  Impaired    Lab Results (last 24 hours)     Procedure Component Value Units Date/Time    Comprehensive Metabolic Panel [[id number]]  (Abnormal) Collected:  04/11/18 0519    Specimen:  Blood Updated:  04/11/18 0739     Glucose 86 mg/dL      BUN 11 mg/dL  "     Creatinine 0.76 mg/dL      Sodium 138 mmol/L      Potassium 3.9 mmol/L      Chloride 100 mmol/L      CO2 27.0 mmol/L      Calcium 9.4 mg/dL      Total Protein 7.1 g/dL      Albumin 3.90 g/dL      ALT (SGPT) 18 (L) U/L      AST (SGOT) 26 U/L      Alkaline Phosphatase 76 U/L      Total Bilirubin 0.5 mg/dL      eGFR Non African Amer 115 mL/min/1.73      Globulin 3.2 gm/dL      A/G Ratio 1.2 g/dL      BUN/Creatinine Ratio 14.5     Anion Gap 11.0 mmol/L     CBC & Differential [[id number]] Collected:  04/11/18 0519    Specimen:  Blood Updated:  04/11/18 0655    Narrative:       The following orders were created for panel order CBC & Differential.  Procedure                               Abnormality         Status                     ---------                               -----------         ------                     CBC Auto Differential[[id number]]        Abnormal            Final result                 Please view results for these tests on the individual orders.    CBC Auto Differential [[id number]]  (Abnormal) Collected:  04/11/18 0519    Specimen:  Blood Updated:  04/11/18 0655     WBC 6.50 10*3/mm3      RBC 4.32 (L) 10*6/mm3      Hemoglobin 12.6 (L) g/dL      Hematocrit 37.8 (L) %      MCV 87.5 fL      MCH 29.2 pg      MCHC 33.3 g/dL      RDW 13.7 %      RDW-SD 43.7 fl      MPV 9.1 fL      Platelets 266 10*3/mm3      Neutrophil % 45.2 %      Lymphocyte % 42.9 %      Monocyte % 7.4 %      Eosinophil % 3.8 %      Basophil % 0.5 %      Immature Grans % 0.2 %      Neutrophils, Absolute 2.94 10*3/mm3      Lymphocytes, Absolute 2.79 10*3/mm3      Monocytes, Absolute 0.48 10*3/mm3      Eosinophils, Absolute 0.25 10*3/mm3      Basophils, Absolute 0.03 10*3/mm3      Immature Grans, Absolute 0.01 10*3/mm3     Urinalysis With / Culture If Indicated - Urine, Clean Catch [[id number]]  (Normal) Collected:  04/09/18 0917    Specimen:  Urine from Urine, Clean Catch Updated:  04/10/18 1250     Color, UA Yellow     Appearance, UA  Clear     pH, UA 5.5     Specific Gravity, UA 1.015     Glucose, UA Negative     Ketones, UA Negative     Bilirubin, UA Negative     Blood, UA Negative     Protein, UA Negative     Leuk Esterase, UA Negative     Nitrite, UA Negative     Urobilinogen, UA 0.2 E.U./dL    Narrative:       Urine microscopic not indicated.        Imaging Results (last 24 hours)     ** No results found for the last 24 hours. **          Medicine:   Current Facility-Administered Medications:   •  acetaminophen (TYLENOL) tablet 650 mg, 650 mg, Oral, Q4H PRN, Devan Riggins MD, 650 mg at 04/10/18 1209  •  hydrOXYzine (VISTARIL) capsule 50 mg, 50 mg, Oral, Q6H PRN, Devan Riggins MD, 50 mg at 04/10/18 2032  •  loperamide (IMODIUM) capsule 2 mg, 2 mg, Oral, 4x Daily PRN, Devan Riggins MD  •  LORazepam (ATIVAN) tablet 1 mg, 1 mg, Oral, Q2H PRN, 1 mg at 04/10/18 2031 **OR** LORazepam (ATIVAN) injection 1 mg, 1 mg, Intravenous, Q2H PRN **OR** LORazepam (ATIVAN) tablet 2 mg, 2 mg, Oral, Q1H PRN **OR** LORazepam (ATIVAN) injection 2 mg, 2 mg, Intravenous, Q1H PRN **OR** LORazepam (ATIVAN) injection 2 mg, 2 mg, Intravenous, Q15 Min PRN **OR** LORazepam (ATIVAN) injection 2 mg, 2 mg, Intramuscular, Q15 Min PRN, Devan Riggins MD  •  LORazepam (ATIVAN) tablet 1 mg, 1 mg, Oral, Q8H, Devan Riggins MD, 1 mg at 04/11/18 0640  •  magnesium hydroxide (MILK OF MAGNESIA) suspension 2400 mg/10mL 10 mL, 10 mL, Oral, Daily PRN, Devan Riggins MD, 10 mL at 04/10/18 2331  •  risperiDONE (risperDAL) tablet 1 mg, 1 mg, Oral, TID, Devan Riggins MD, 1 mg at 04/11/18 0833  •  traZODone (DESYREL) tablet 50 mg, 50 mg, Oral, Nightly PRN, Devan Riggins MD, 50 mg at 04/10/18 2032  •  ziprasidone (GEODON) injection 20 mg, 20 mg, Intramuscular, Daily PRN, Devan Riggins MD, 20 mg at 04/10/18 2227    Diagnoses/Assessment:   Principal Problem:    Amphetamine-induced psychotic disorder with hallucinations  Active Problems:    Amphetamine  use disorder, severe    Alcohol dependence with withdrawal delirium      Treatment Plan:    1) Will continue care for the patient on the behavioral health unit at [facility] to ensure patient safety.  2) Will continue to provide treatment with the unit milieu, activities, therapies and psychopharmacological management.  3) Patient to be placed on or continued on  Q15 minute checks  and Elopement, Aggression and Fall precautions.  4) Pertinent medical issues: Spasticity and complaints of back pain.  Will restart the neurontiin at 600mg tid and baclofen at 5mg tid.  5) Will order following labs: none  6) Will make the following medication changes: Will continue the risperdal 1mg tid and ativan 1mg tid and CIWA.  7) Will continue discharge planning as appropriate for patient.  8) Psychotherapy provided: none    Treatment plan and medication risks and benefits discussed with: Patient and Treatment Team    Devan Riggins MD  04/11/18  10:42 AM   Opt out